# Patient Record
Sex: MALE | Race: WHITE | Employment: OTHER | URBAN - METROPOLITAN AREA
[De-identification: names, ages, dates, MRNs, and addresses within clinical notes are randomized per-mention and may not be internally consistent; named-entity substitution may affect disease eponyms.]

---

## 2017-01-01 ENCOUNTER — NURSING HOME VISIT (OUTPATIENT)
Dept: GERIATRICS | Facility: CLINIC | Age: 82
End: 2017-01-01
Payer: COMMERCIAL

## 2017-01-01 ENCOUNTER — NURSING HOME VISIT (OUTPATIENT)
Dept: GERIATRICS | Facility: CLINIC | Age: 82
End: 2017-01-01

## 2017-01-01 ENCOUNTER — HOSPITAL LABORATORY (OUTPATIENT)
Dept: OTHER | Facility: CLINIC | Age: 82
End: 2017-01-01

## 2017-01-01 ENCOUNTER — DOCUMENTATION ONLY (OUTPATIENT)
Dept: OTHER | Facility: CLINIC | Age: 82
End: 2017-01-01

## 2017-01-01 ENCOUNTER — ALLIED HEALTH/NURSE VISIT (OUTPATIENT)
Dept: CARDIOLOGY | Facility: CLINIC | Age: 82
End: 2017-01-01
Payer: COMMERCIAL

## 2017-01-01 ENCOUNTER — DOCUMENTATION ONLY (OUTPATIENT)
Dept: CARDIOLOGY | Facility: CLINIC | Age: 82
End: 2017-01-01

## 2017-01-01 ENCOUNTER — NURSING HOME VISIT (OUTPATIENT)
Dept: FAMILY MEDICINE | Facility: CLINIC | Age: 82
End: 2017-01-01

## 2017-01-01 ENCOUNTER — NURSING HOME VISIT (OUTPATIENT)
Dept: FAMILY MEDICINE | Facility: CLINIC | Age: 82
End: 2017-01-01
Payer: COMMERCIAL

## 2017-01-01 VITALS
DIASTOLIC BLOOD PRESSURE: 78 MMHG | HEIGHT: 69 IN | TEMPERATURE: 97.6 F | WEIGHT: 152.8 LBS | SYSTOLIC BLOOD PRESSURE: 144 MMHG | RESPIRATION RATE: 18 BRPM | BODY MASS INDEX: 22.63 KG/M2 | HEART RATE: 63 BPM | OXYGEN SATURATION: 94 %

## 2017-01-01 VITALS
HEIGHT: 69 IN | OXYGEN SATURATION: 96 % | BODY MASS INDEX: 23.4 KG/M2 | HEART RATE: 61 BPM | TEMPERATURE: 97.9 F | WEIGHT: 158 LBS | DIASTOLIC BLOOD PRESSURE: 59 MMHG | SYSTOLIC BLOOD PRESSURE: 130 MMHG | RESPIRATION RATE: 18 BRPM

## 2017-01-01 VITALS
TEMPERATURE: 98.8 F | HEART RATE: 61 BPM | HEIGHT: 69 IN | DIASTOLIC BLOOD PRESSURE: 57 MMHG | WEIGHT: 162.8 LBS | RESPIRATION RATE: 16 BRPM | BODY MASS INDEX: 24.11 KG/M2 | SYSTOLIC BLOOD PRESSURE: 118 MMHG | OXYGEN SATURATION: 95 %

## 2017-01-01 VITALS
DIASTOLIC BLOOD PRESSURE: 76 MMHG | BODY MASS INDEX: 22.63 KG/M2 | TEMPERATURE: 98.1 F | RESPIRATION RATE: 16 BRPM | OXYGEN SATURATION: 94 % | HEART RATE: 68 BPM | SYSTOLIC BLOOD PRESSURE: 142 MMHG | WEIGHT: 152.8 LBS | HEIGHT: 69 IN

## 2017-01-01 VITALS
WEIGHT: 162.8 LBS | OXYGEN SATURATION: 95 % | HEIGHT: 69 IN | TEMPERATURE: 98.8 F | HEART RATE: 61 BPM | SYSTOLIC BLOOD PRESSURE: 118 MMHG | BODY MASS INDEX: 24.11 KG/M2 | DIASTOLIC BLOOD PRESSURE: 57 MMHG | RESPIRATION RATE: 16 BRPM

## 2017-01-01 VITALS
DIASTOLIC BLOOD PRESSURE: 67 MMHG | WEIGHT: 162.8 LBS | SYSTOLIC BLOOD PRESSURE: 140 MMHG | BODY MASS INDEX: 24.11 KG/M2 | HEIGHT: 69 IN | TEMPERATURE: 98 F | HEART RATE: 67 BPM | OXYGEN SATURATION: 96 % | RESPIRATION RATE: 18 BRPM

## 2017-01-01 VITALS
WEIGHT: 162.8 LBS | HEART RATE: 67 BPM | HEIGHT: 69 IN | BODY MASS INDEX: 24.11 KG/M2 | OXYGEN SATURATION: 95 % | TEMPERATURE: 98 F | RESPIRATION RATE: 18 BRPM | DIASTOLIC BLOOD PRESSURE: 67 MMHG | SYSTOLIC BLOOD PRESSURE: 140 MMHG

## 2017-01-01 VITALS
DIASTOLIC BLOOD PRESSURE: 67 MMHG | WEIGHT: 162.8 LBS | BODY MASS INDEX: 24.11 KG/M2 | RESPIRATION RATE: 18 BRPM | OXYGEN SATURATION: 98 % | HEIGHT: 69 IN | TEMPERATURE: 98 F | SYSTOLIC BLOOD PRESSURE: 140 MMHG | HEART RATE: 67 BPM

## 2017-01-01 VITALS
DIASTOLIC BLOOD PRESSURE: 78 MMHG | BODY MASS INDEX: 22.63 KG/M2 | SYSTOLIC BLOOD PRESSURE: 144 MMHG | OXYGEN SATURATION: 97 % | HEART RATE: 63 BPM | TEMPERATURE: 97.6 F | WEIGHT: 152.8 LBS | RESPIRATION RATE: 18 BRPM | HEIGHT: 69 IN

## 2017-01-01 VITALS
RESPIRATION RATE: 18 BRPM | WEIGHT: 161.4 LBS | HEART RATE: 60 BPM | BODY MASS INDEX: 23.91 KG/M2 | DIASTOLIC BLOOD PRESSURE: 58 MMHG | SYSTOLIC BLOOD PRESSURE: 123 MMHG | OXYGEN SATURATION: 97 % | HEIGHT: 69 IN | TEMPERATURE: 97.9 F

## 2017-01-01 VITALS
BODY MASS INDEX: 22.63 KG/M2 | HEIGHT: 69 IN | TEMPERATURE: 98.1 F | DIASTOLIC BLOOD PRESSURE: 76 MMHG | WEIGHT: 152.8 LBS | RESPIRATION RATE: 16 BRPM | HEART RATE: 68 BPM | OXYGEN SATURATION: 98 % | SYSTOLIC BLOOD PRESSURE: 142 MMHG

## 2017-01-01 VITALS
BODY MASS INDEX: 23.25 KG/M2 | WEIGHT: 157 LBS | TEMPERATURE: 97.9 F | DIASTOLIC BLOOD PRESSURE: 77 MMHG | HEART RATE: 61 BPM | SYSTOLIC BLOOD PRESSURE: 132 MMHG | OXYGEN SATURATION: 96 % | HEIGHT: 69 IN | RESPIRATION RATE: 18 BRPM

## 2017-01-01 VITALS
RESPIRATION RATE: 16 BRPM | BODY MASS INDEX: 22.63 KG/M2 | SYSTOLIC BLOOD PRESSURE: 132 MMHG | DIASTOLIC BLOOD PRESSURE: 73 MMHG | OXYGEN SATURATION: 95 % | TEMPERATURE: 98.6 F | WEIGHT: 152.8 LBS | HEIGHT: 69 IN | HEART RATE: 64 BPM

## 2017-01-01 VITALS
DIASTOLIC BLOOD PRESSURE: 78 MMHG | TEMPERATURE: 97.6 F | RESPIRATION RATE: 16 BRPM | HEIGHT: 69 IN | WEIGHT: 158 LBS | HEART RATE: 72 BPM | OXYGEN SATURATION: 96 % | SYSTOLIC BLOOD PRESSURE: 121 MMHG | BODY MASS INDEX: 23.4 KG/M2

## 2017-01-01 VITALS
WEIGHT: 162.8 LBS | BODY MASS INDEX: 24.04 KG/M2 | RESPIRATION RATE: 16 BRPM | DIASTOLIC BLOOD PRESSURE: 57 MMHG | OXYGEN SATURATION: 95 % | TEMPERATURE: 98.8 F | SYSTOLIC BLOOD PRESSURE: 118 MMHG | HEART RATE: 61 BPM

## 2017-01-01 VITALS
TEMPERATURE: 98.8 F | BODY MASS INDEX: 24.11 KG/M2 | DIASTOLIC BLOOD PRESSURE: 57 MMHG | WEIGHT: 162.8 LBS | HEIGHT: 69 IN | SYSTOLIC BLOOD PRESSURE: 118 MMHG | RESPIRATION RATE: 16 BRPM | HEART RATE: 61 BPM | OXYGEN SATURATION: 96 %

## 2017-01-01 VITALS
HEART RATE: 62 BPM | TEMPERATURE: 97.6 F | SYSTOLIC BLOOD PRESSURE: 107 MMHG | HEIGHT: 69 IN | DIASTOLIC BLOOD PRESSURE: 57 MMHG | OXYGEN SATURATION: 96 % | RESPIRATION RATE: 18 BRPM | WEIGHT: 161.4 LBS | BODY MASS INDEX: 23.91 KG/M2

## 2017-01-01 VITALS
BODY MASS INDEX: 22.51 KG/M2 | SYSTOLIC BLOOD PRESSURE: 117 MMHG | DIASTOLIC BLOOD PRESSURE: 52 MMHG | OXYGEN SATURATION: 96 % | TEMPERATURE: 98.5 F | WEIGHT: 152 LBS | HEART RATE: 61 BPM | HEIGHT: 69 IN | RESPIRATION RATE: 16 BRPM

## 2017-01-01 VITALS
RESPIRATION RATE: 16 BRPM | HEART RATE: 61 BPM | DIASTOLIC BLOOD PRESSURE: 52 MMHG | SYSTOLIC BLOOD PRESSURE: 117 MMHG | TEMPERATURE: 98.5 F | HEIGHT: 69 IN | BODY MASS INDEX: 22.51 KG/M2 | OXYGEN SATURATION: 95 % | WEIGHT: 152 LBS

## 2017-01-01 VITALS
HEART RATE: 61 BPM | BODY MASS INDEX: 24.11 KG/M2 | OXYGEN SATURATION: 97 % | WEIGHT: 162.8 LBS | RESPIRATION RATE: 16 BRPM | TEMPERATURE: 98.8 F | SYSTOLIC BLOOD PRESSURE: 118 MMHG | DIASTOLIC BLOOD PRESSURE: 57 MMHG | HEIGHT: 69 IN

## 2017-01-01 VITALS
OXYGEN SATURATION: 95 % | TEMPERATURE: 97.6 F | DIASTOLIC BLOOD PRESSURE: 57 MMHG | HEART RATE: 62 BPM | SYSTOLIC BLOOD PRESSURE: 107 MMHG | HEIGHT: 69 IN | BODY MASS INDEX: 23.91 KG/M2 | WEIGHT: 161.4 LBS | RESPIRATION RATE: 18 BRPM

## 2017-01-01 VITALS
RESPIRATION RATE: 18 BRPM | HEIGHT: 69 IN | SYSTOLIC BLOOD PRESSURE: 123 MMHG | WEIGHT: 161.4 LBS | TEMPERATURE: 97.9 F | DIASTOLIC BLOOD PRESSURE: 58 MMHG | BODY MASS INDEX: 23.91 KG/M2 | OXYGEN SATURATION: 96 % | HEART RATE: 60 BPM

## 2017-01-01 VITALS
HEART RATE: 68 BPM | SYSTOLIC BLOOD PRESSURE: 110 MMHG | WEIGHT: 157 LBS | HEIGHT: 69 IN | OXYGEN SATURATION: 95 % | RESPIRATION RATE: 16 BRPM | DIASTOLIC BLOOD PRESSURE: 60 MMHG | TEMPERATURE: 97.8 F | BODY MASS INDEX: 23.25 KG/M2

## 2017-01-01 VITALS
SYSTOLIC BLOOD PRESSURE: 107 MMHG | DIASTOLIC BLOOD PRESSURE: 57 MMHG | TEMPERATURE: 97.6 F | RESPIRATION RATE: 18 BRPM | BODY MASS INDEX: 23.91 KG/M2 | OXYGEN SATURATION: 96 % | HEART RATE: 62 BPM | HEIGHT: 69 IN | WEIGHT: 161.4 LBS

## 2017-01-01 DIAGNOSIS — S72.002K CLOSED FRACTURE OF NECK OF LEFT FEMUR WITH NONUNION, SUBSEQUENT ENCOUNTER: ICD-10-CM

## 2017-01-01 DIAGNOSIS — Z79.01 ENCOUNTER FOR MONITORING COUMADIN THERAPY: ICD-10-CM

## 2017-01-01 DIAGNOSIS — I48.0 PAROXYSMAL ATRIAL FIBRILLATION (H): Primary | ICD-10-CM

## 2017-01-01 DIAGNOSIS — M15.9 OSTEOARTHRITIS OF MULTIPLE JOINTS, UNSPECIFIED OSTEOARTHRITIS TYPE: Primary | ICD-10-CM

## 2017-01-01 DIAGNOSIS — G89.29 OTHER CHRONIC PAIN: ICD-10-CM

## 2017-01-01 DIAGNOSIS — I50.22 CHRONIC SYSTOLIC CONGESTIVE HEART FAILURE (H): ICD-10-CM

## 2017-01-01 DIAGNOSIS — J44.9 COPD, MODERATE (H): ICD-10-CM

## 2017-01-01 DIAGNOSIS — Z85.038 HISTORY OF COLON CANCER, NO STAGING: ICD-10-CM

## 2017-01-01 DIAGNOSIS — G61.82 MULTIFOCAL MOTOR NEUROPATHY (H): ICD-10-CM

## 2017-01-01 DIAGNOSIS — Z79.01 LONG-TERM (CURRENT) USE OF ANTICOAGULANTS: ICD-10-CM

## 2017-01-01 DIAGNOSIS — Z79.01 ENCOUNTER FOR MONITORING COUMADIN THERAPY: Primary | ICD-10-CM

## 2017-01-01 DIAGNOSIS — E11.22 TYPE 2 DIABETES MELLITUS WITH STAGE 3 CHRONIC KIDNEY DISEASE, WITHOUT LONG-TERM CURRENT USE OF INSULIN (H): ICD-10-CM

## 2017-01-01 DIAGNOSIS — I48.0 PAROXYSMAL ATRIAL FIBRILLATION (H): ICD-10-CM

## 2017-01-01 DIAGNOSIS — N18.30 CKD (CHRONIC KIDNEY DISEASE) STAGE 3, GFR 30-59 ML/MIN (H): ICD-10-CM

## 2017-01-01 DIAGNOSIS — E11.42 TYPE 2 DIABETES MELLITUS WITH DIABETIC POLYNEUROPATHY, WITHOUT LONG-TERM CURRENT USE OF INSULIN (H): Primary | ICD-10-CM

## 2017-01-01 DIAGNOSIS — F41.9 ANXIETY DISORDER, UNSPECIFIED TYPE: Primary | ICD-10-CM

## 2017-01-01 DIAGNOSIS — F41.9 ANXIETY: ICD-10-CM

## 2017-01-01 DIAGNOSIS — R30.0 DYSURIA: Primary | ICD-10-CM

## 2017-01-01 DIAGNOSIS — Z51.81 ENCOUNTER FOR MONITORING COUMADIN THERAPY: ICD-10-CM

## 2017-01-01 DIAGNOSIS — F41.9 ANXIETY DISORDER, UNSPECIFIED TYPE: ICD-10-CM

## 2017-01-01 DIAGNOSIS — Z51.81 ENCOUNTER FOR THERAPEUTIC DRUG MONITORING: Primary | ICD-10-CM

## 2017-01-01 DIAGNOSIS — S72.002K CLOSED FRACTURE OF NECK OF LEFT FEMUR WITH NONUNION, SUBSEQUENT ENCOUNTER: Primary | ICD-10-CM

## 2017-01-01 DIAGNOSIS — J44.9 COPD, MODERATE (H): Primary | ICD-10-CM

## 2017-01-01 DIAGNOSIS — F32.1 MAJOR DEPRESSIVE DISORDER, SINGLE EPISODE, MODERATE (H): ICD-10-CM

## 2017-01-01 DIAGNOSIS — H61.23 BILATERAL IMPACTED CERUMEN: ICD-10-CM

## 2017-01-01 DIAGNOSIS — N39.0 URINARY TRACT INFECTION WITHOUT HEMATURIA, SITE UNSPECIFIED: Primary | ICD-10-CM

## 2017-01-01 DIAGNOSIS — E11.40 TYPE 2 DIABETES MELLITUS WITH DIABETIC NEUROPATHY, WITHOUT LONG-TERM CURRENT USE OF INSULIN (H): Primary | ICD-10-CM

## 2017-01-01 DIAGNOSIS — Z51.81 ENCOUNTER FOR MONITORING COUMADIN THERAPY: Primary | ICD-10-CM

## 2017-01-01 DIAGNOSIS — N18.30 TYPE 2 DIABETES MELLITUS WITH STAGE 3 CHRONIC KIDNEY DISEASE, WITHOUT LONG-TERM CURRENT USE OF INSULIN (H): ICD-10-CM

## 2017-01-01 DIAGNOSIS — G62.9 NEUROPATHY: Primary | ICD-10-CM

## 2017-01-01 DIAGNOSIS — Z87.39 HISTORY OF ACUTE GOUTY ARTHRITIS: ICD-10-CM

## 2017-01-01 DIAGNOSIS — R05.9 COUGH: ICD-10-CM

## 2017-01-01 DIAGNOSIS — G89.4 CHRONIC PAIN SYNDROME: ICD-10-CM

## 2017-01-01 DIAGNOSIS — M25.511 RIGHT SHOULDER PAIN, UNSPECIFIED CHRONICITY: Primary | ICD-10-CM

## 2017-01-01 DIAGNOSIS — Z53.9 ERRONEOUS ENCOUNTER--DISREGARD: Primary | ICD-10-CM

## 2017-01-01 DIAGNOSIS — G62.9 NEUROPATHY: ICD-10-CM

## 2017-01-01 DIAGNOSIS — M10.9 GOUT OF RIGHT HAND, UNSPECIFIED CAUSE, UNSPECIFIED CHRONICITY: Primary | ICD-10-CM

## 2017-01-01 DIAGNOSIS — E11.40 TYPE 2 DIABETES MELLITUS WITH DIABETIC NEUROPATHY, WITHOUT LONG-TERM CURRENT USE OF INSULIN (H): ICD-10-CM

## 2017-01-01 DIAGNOSIS — Z95.0 CARDIAC PACEMAKER IN SITU: Primary | ICD-10-CM

## 2017-01-01 DIAGNOSIS — Z71.89 ADVANCE CARE PLANNING: Chronic | ICD-10-CM

## 2017-01-01 DIAGNOSIS — Z87.440 PERSONAL HISTORY OF URINARY TRACT INFECTION: ICD-10-CM

## 2017-01-01 DIAGNOSIS — K43.9 VENTRAL HERNIA WITHOUT OBSTRUCTION OR GANGRENE: ICD-10-CM

## 2017-01-01 DIAGNOSIS — Z95.0 CARDIAC PACEMAKER IN SITU: ICD-10-CM

## 2017-01-01 DIAGNOSIS — B37.2 CANDIDIASIS OF SKIN: Primary | ICD-10-CM

## 2017-01-01 DIAGNOSIS — R09.02 HYPOXIA: ICD-10-CM

## 2017-01-01 DIAGNOSIS — N39.0 URINARY TRACT INFECTION WITHOUT HEMATURIA, SITE UNSPECIFIED: ICD-10-CM

## 2017-01-01 DIAGNOSIS — J06.9 UPPER RESPIRATORY TRACT INFECTION, UNSPECIFIED TYPE: ICD-10-CM

## 2017-01-01 DIAGNOSIS — R06.02 SOB (SHORTNESS OF BREATH): Primary | ICD-10-CM

## 2017-01-01 DIAGNOSIS — F10.21 ALCOHOL DEPENDENCE IN REMISSION (H): ICD-10-CM

## 2017-01-01 LAB
ALBUMIN UR-MCNC: 100 MG/DL
ALBUMIN UR-MCNC: 30 MG/DL
ALBUMIN UR-MCNC: NEGATIVE MG/DL
ANION GAP SERPL CALCULATED.3IONS-SCNC: 10 MMOL/L (ref 3–14)
ANION GAP SERPL CALCULATED.3IONS-SCNC: 7 MMOL/L (ref 3–14)
ANION GAP SERPL CALCULATED.3IONS-SCNC: 8 MMOL/L (ref 3–14)
APPEARANCE UR: ABNORMAL
APPEARANCE UR: ABNORMAL
APPEARANCE UR: CLEAR
BACTERIA #/AREA URNS HPF: ABNORMAL /HPF
BACTERIA SPEC CULT: ABNORMAL
BILIRUB UR QL STRIP: NEGATIVE
BUN SERPL-MCNC: 29 MG/DL (ref 7–30)
BUN SERPL-MCNC: 29 MG/DL (ref 7–30)
BUN SERPL-MCNC: 36 MG/DL (ref 7–30)
CALCIUM SERPL-MCNC: 8.6 MG/DL (ref 8.5–10.1)
CALCIUM SERPL-MCNC: 8.8 MG/DL (ref 8.5–10.1)
CALCIUM SERPL-MCNC: 9 MG/DL (ref 8.5–10.1)
CHLORIDE SERPL-SCNC: 104 MMOL/L (ref 94–109)
CHLORIDE SERPL-SCNC: 105 MMOL/L (ref 94–109)
CHLORIDE SERPL-SCNC: 105 MMOL/L (ref 94–109)
CO2 SERPL-SCNC: 25 MMOL/L (ref 20–32)
CO2 SERPL-SCNC: 27 MMOL/L (ref 20–32)
CO2 SERPL-SCNC: 27 MMOL/L (ref 20–32)
COLOR UR AUTO: YELLOW
CREAT SERPL-MCNC: 1.34 MG/DL (ref 0.66–1.25)
CREAT SERPL-MCNC: 1.45 MG/DL (ref 0.66–1.25)
CREAT SERPL-MCNC: 1.48 MG/DL (ref 0.66–1.25)
ERYTHROCYTE [DISTWIDTH] IN BLOOD BY AUTOMATED COUNT: 14 % (ref 10–15)
ERYTHROCYTE [DISTWIDTH] IN BLOOD BY AUTOMATED COUNT: 14.1 % (ref 10–15)
ERYTHROCYTE [DISTWIDTH] IN BLOOD BY AUTOMATED COUNT: 14.1 % (ref 10–15)
GFR SERPL CREATININE-BSD FRML MDRD: 45 ML/MIN/1.7M2
GFR SERPL CREATININE-BSD FRML MDRD: 46 ML/MIN/1.7M2
GFR SERPL CREATININE-BSD FRML MDRD: 50 ML/MIN/1.7M2
GLUCOSE SERPL-MCNC: 105 MG/DL (ref 70–99)
GLUCOSE SERPL-MCNC: 133 MG/DL (ref 70–99)
GLUCOSE SERPL-MCNC: 95 MG/DL (ref 70–99)
GLUCOSE UR STRIP-MCNC: NEGATIVE MG/DL
HBA1C MFR BLD: 7.8 % (ref 4.3–6)
HCT VFR BLD AUTO: 31.7 % (ref 40–53)
HCT VFR BLD AUTO: 32.9 % (ref 40–53)
HCT VFR BLD AUTO: 34.6 % (ref 40–53)
HGB BLD-MCNC: 10 G/DL (ref 13.3–17.7)
HGB BLD-MCNC: 10.5 G/DL (ref 13.3–17.7)
HGB BLD-MCNC: 11 G/DL (ref 13.3–17.7)
HGB UR QL STRIP: ABNORMAL
HGB UR QL STRIP: ABNORMAL
HGB UR QL STRIP: NEGATIVE
KETONES UR STRIP-MCNC: NEGATIVE MG/DL
LEUKOCYTE ESTERASE UR QL STRIP: ABNORMAL
LEUKOCYTE ESTERASE UR QL STRIP: ABNORMAL
LEUKOCYTE ESTERASE UR QL STRIP: NEGATIVE
Lab: ABNORMAL
Lab: ABNORMAL
MCH RBC QN AUTO: 28 PG (ref 26.5–33)
MCH RBC QN AUTO: 28.2 PG (ref 26.5–33)
MCH RBC QN AUTO: 28.5 PG (ref 26.5–33)
MCHC RBC AUTO-ENTMCNC: 31.5 G/DL (ref 31.5–36.5)
MCHC RBC AUTO-ENTMCNC: 31.8 G/DL (ref 31.5–36.5)
MCHC RBC AUTO-ENTMCNC: 31.9 G/DL (ref 31.5–36.5)
MCV RBC AUTO: 88 FL (ref 78–100)
MCV RBC AUTO: 89 FL (ref 78–100)
MCV RBC AUTO: 90 FL (ref 78–100)
NITRATE UR QL: NEGATIVE
NITRATE UR QL: POSITIVE
NITRATE UR QL: POSITIVE
PH UR STRIP: 5 PH (ref 5–7)
PH UR STRIP: 7.5 PH (ref 5–7)
PH UR STRIP: 8.5 PH (ref 5–7)
PLATELET # BLD AUTO: 183 10E9/L (ref 150–450)
PLATELET # BLD AUTO: 190 10E9/L (ref 150–450)
PLATELET # BLD AUTO: 211 10E9/L (ref 150–450)
POTASSIUM SERPL-SCNC: 4 MMOL/L (ref 3.4–5.3)
POTASSIUM SERPL-SCNC: 4.2 MMOL/L (ref 3.4–5.3)
POTASSIUM SERPL-SCNC: 4.7 MMOL/L (ref 3.4–5.3)
RBC # BLD AUTO: 3.54 10E12/L (ref 4.4–5.9)
RBC # BLD AUTO: 3.69 10E12/L (ref 4.4–5.9)
RBC # BLD AUTO: 3.93 10E12/L (ref 4.4–5.9)
RBC #/AREA URNS AUTO: 31 /HPF (ref 0–2)
RBC #/AREA URNS AUTO: 83 /HPF (ref 0–2)
SODIUM SERPL-SCNC: 139 MMOL/L (ref 133–144)
SODIUM SERPL-SCNC: 139 MMOL/L (ref 133–144)
SODIUM SERPL-SCNC: 140 MMOL/L (ref 133–144)
SOURCE: ABNORMAL
SOURCE: ABNORMAL
SOURCE: NORMAL
SP GR UR STRIP: 1.01 (ref 1–1.03)
SPECIMEN SOURCE: ABNORMAL
SPECIMEN SOURCE: ABNORMAL
SQUAMOUS #/AREA URNS AUTO: <1 /HPF (ref 0–1)
UROBILINOGEN UR STRIP-MCNC: NORMAL MG/DL (ref 0–2)
WBC # BLD AUTO: 7 10E9/L (ref 4–11)
WBC # BLD AUTO: 7.6 10E9/L (ref 4–11)
WBC # BLD AUTO: 9.3 10E9/L (ref 4–11)
WBC #/AREA URNS AUTO: 2 /HPF (ref 0–2)
WBC #/AREA URNS AUTO: 6 /HPF (ref 0–2)

## 2017-01-01 PROCEDURE — 99308 SBSQ NF CARE LOW MDM 20: CPT | Performed by: NURSE PRACTITIONER

## 2017-01-01 PROCEDURE — 99309 SBSQ NF CARE MODERATE MDM 30: CPT | Performed by: NURSE PRACTITIONER

## 2017-01-01 PROCEDURE — 69210 REMOVE IMPACTED EAR WAX UNI: CPT | Performed by: NURSE PRACTITIONER

## 2017-01-01 PROCEDURE — 99309 SBSQ NF CARE MODERATE MDM 30: CPT | Performed by: INTERNAL MEDICINE

## 2017-01-01 PROCEDURE — 93294 REM INTERROG EVL PM/LDLS PM: CPT | Performed by: INTERNAL MEDICINE

## 2017-01-01 PROCEDURE — 93296 REM INTERROG EVL PM/IDS: CPT | Performed by: INTERNAL MEDICINE

## 2017-01-01 PROCEDURE — 99318 ZZC ANNUAL NURSING FAC ASSESSMNT, STABLE: CPT | Mod: 25 | Performed by: NURSE PRACTITIONER

## 2017-01-01 RX ORDER — AMOXICILLIN 250 MG
1 CAPSULE ORAL DAILY
COMMUNITY

## 2017-01-01 RX ORDER — CLOTRIMAZOLE 1 %
CREAM (GRAM) TOPICAL EVERY 12 HOURS
COMMUNITY
End: 2018-01-01

## 2017-01-01 RX ORDER — ALBUTEROL SULFATE 0.83 MG/ML
1 SOLUTION RESPIRATORY (INHALATION) 2 TIMES DAILY
COMMUNITY

## 2017-01-01 RX ORDER — ALBUTEROL SULFATE 0.83 MG/ML
1 SOLUTION RESPIRATORY (INHALATION) 2 TIMES DAILY
COMMUNITY
Start: 2017-01-01 | End: 2017-01-01

## 2017-01-02 ENCOUNTER — NURSING HOME VISIT (OUTPATIENT)
Dept: GERIATRICS | Facility: CLINIC | Age: 82
End: 2017-01-02

## 2017-01-02 DIAGNOSIS — M25.552 HIP PAIN, LEFT: Primary | ICD-10-CM

## 2017-01-02 NOTE — PROGRESS NOTES
Ortho Nursing home visit    Suad Sloan is a 86 year old male who resides at [facility] 3rd floor    Patient is seen today for eval of left hip pain, and ability to transfer bed to chair, patient has been angela lift 2nd to hip fx, treated non-op, and bed to chair mobility;. Under hospice care,       Past Medical History   Diagnosis Date     Hypertension      resolved     Atrial fibrillation (H)      on coumadin -  Dr Huddleston     Coronary atherosclerosis 1995     Dr Huddleston     Arthritis of hands      hands/feet - dr varela     Alcohol dependence (H)      quit 2003     Iron deficiency anemia 7/05     AAA (abdominal aortic aneurysm) (H) 9/05     AAA, Lt Iliac - Dr Garcia     Diverticulosis of colon (without mention of hemorrhage)      Type 2 diabetes mellitus (H) 2005     Sleep apnea 6/10     moderate - auto ASV - dr goltz/darvin     Restless leg syndrome      Hyperlipidemia      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     PPM     CKD (chronic kidney disease) stage 3, GFR 30-59 ml/min      COPD, moderate (H)      Peripheral neuropathy (H)      Cardiomyopathy (H)      EF 35-40% on 12/2014 Echo     Vertigo      Pulmonary hypertension (H)      Depression      Anxiety      Chronic pain      Chronic systolic congestive heart failure (H)      EF 35-40% on 12/2014 Echo     Iliac artery aneurysm, left (H)      Malignant neoplasm of prostate (H) 1994     s/p radiation     Colon cancer (H) 9/05     Grade IIA adenoca -Stage 2 T3N0N0 - Dr Felder, Dr Hampton tubular adenoma, 2 cm colon ca mass     Bladder cancer (H) 5/06     dr rodriguez- cyst removed and bx was benign      Past Surgical History   Procedure Laterality Date     Cholecystectomy  1997     Appendectomy open  1970's     Colonoscopy  9/05, 4/07, 2/11     tubular adenoma - due 3 yrs     Colon surgery  9/05     colon ca, dr hampton,      Aaa repair / umbilical hernia repair  6/06     dr garcia      Cataract iol, rt/lt Right 1/09     dr kurtz     Cataract iol, rt/lt Left 2/09      dr kurtz     Lysis of adhesions, repair of incisional hernias  2/10     Implant pacemaker  09-16-11     C fabric wrapping of abdominal aneurysm       Endovascular repair aneurysm abdominal aorta N/A 12/16/2015     ENDOVASCULAR REPAIR ANEURYSM ABDOMINAL AORTA;  Surgeon: Timmy Abdalla MD;  Location: [facility] OR     Esophagoscopy, gastroscopy, duodenoscopy (egd), combined N/A 3/28/2016     Esophagitis, small erosions, inflammtory nodule        Allergies   Allergen Reactions     Ace Inhibitors      hyperkalemia     Cleocin      Severe Heartburn     Dulera      Leg cramps, gas, mouth sores     Erythromycin      upset stomach     Hydralazine      Throat swelling     Imdur [Isosorbide]      Stomach upset     Methadone Other (See Comments)     Became very confused and too sedated     Penicillins Nausea     Spiriva Handihaler      Mouth sores, leg cramps      There were no vitals taken for this visit.     Exam: Today, with staff, and wife, patient has attempt to transfer from bed to chair W/O mech lift, and is clearly unable to bear weight for standing on Right leg ( unaffected ) leg, to transfer safely.      X-rays show Displaced left fem neck fracture:    ASSESSMENT / PLAN: Everyone is in agreement today, that continuing with Ronal Lift is appropriate way to transfer:  Will continue to follow:    [id number]          [facility]  [phone]

## 2017-01-03 ENCOUNTER — TELEPHONE (OUTPATIENT)
Dept: GERIATRICS | Facility: CLINIC | Age: 82
End: 2017-01-03

## 2017-01-03 ENCOUNTER — MEDICAL CORRESPONDENCE (OUTPATIENT)
Dept: HEALTH INFORMATION MANAGEMENT | Facility: CLINIC | Age: 82
End: 2017-01-03

## 2017-01-03 NOTE — TELEPHONE ENCOUNTER
S: Nursing called, concerned as noted multiple bruises to patient's arms and legs, wanted to know if they should continue coumadin.      B: on coumadin for a-fib: current dose 4.25mg QD  -hospice care patient    A: INR 1.7 today  Nursing unsure of where bruises came from, they are not bothersome to patient.  No current open areas or active bleeding    R: ok to continue current dosing of coumadin--update if active signs of bleeding or discomfort to patient  -primary NP or hospice can decide with family if want to continue coumadin now that on hospice

## 2017-01-06 ENCOUNTER — NURSING HOME VISIT (OUTPATIENT)
Dept: GERIATRICS | Facility: CLINIC | Age: 82
End: 2017-01-06
Payer: MEDICARE

## 2017-01-06 DIAGNOSIS — Z79.01 ENCOUNTER FOR MONITORING COUMADIN THERAPY: ICD-10-CM

## 2017-01-06 DIAGNOSIS — Z51.81 ENCOUNTER FOR MONITORING COUMADIN THERAPY: ICD-10-CM

## 2017-01-06 DIAGNOSIS — I48.0 PAROXYSMAL ATRIAL FIBRILLATION (H): Primary | ICD-10-CM

## 2017-01-06 PROCEDURE — 99207 ZZC CDG-CORRECTLY CODED, REVIEWED AND AGREE: CPT | Performed by: NURSE PRACTITIONER

## 2017-01-06 PROCEDURE — 99307 SBSQ NF CARE SF MDM 10: CPT | Mod: GW | Performed by: NURSE PRACTITIONER

## 2017-01-06 NOTE — PROGRESS NOTES
[facility]    HPI:    Suad Sloan is a 86 year old  (12/23/1930), who is being seen today for an episodic care visit at [facility]. Today's concern is INR/Coumadin management for A. Fib    Bleeding Signs/Symptoms:  None  Thromboembolic Signs/Symptoms:  None    Medication Changes:  No  Dietary Changes:  Yes  Activity Changes: No  Bacterial/Viral Infection:  No    Missed Coumadin Doses:  None    Other Concerns:  No      OBJECTIVE:    INR Today:  1.6  Current Dose:  4.25 mg po daily    ASSESSMENT:    Subtherapeutic INR for goal of 2-3    PLAN:    New Dose: 4.5 mg po daily      Next INR: 1 week    Caro Warner NP

## 2017-01-16 ENCOUNTER — ALLIED HEALTH/NURSE VISIT (OUTPATIENT)
Dept: CARDIOLOGY | Facility: CLINIC | Age: 82
End: 2017-01-16
Payer: MEDICARE

## 2017-01-16 DIAGNOSIS — Z95.0 CARDIAC PACEMAKER IN SITU: Primary | ICD-10-CM

## 2017-01-16 PROCEDURE — 93294 REM INTERROG EVL PM/LDLS PM: CPT | Mod: GW | Performed by: INTERNAL MEDICINE

## 2017-01-16 PROCEDURE — 93296 REM INTERROG EVL PM/IDS: CPT | Mod: GW | Performed by: INTERNAL MEDICINE

## 2017-01-16 NOTE — PROGRESS NOTES
[employer] Sensia SESR01 (S) Remote PPM Device Check  : 93%  Mode: VVIR        Presenting Rhythm:   Heart Rate: adequate heart rates per histogram  Sensing: not performed    Pacing Threshold: stable    Impedance: stable  Battery Status: 3 - 5.5 years remaining  Atrial Arrhythmia: chronic Afib, taking Coumadin  Ventricular Arrhythmia: 4 vent high rates. Marker only EGMs show irregular VS events suggesting RVR. Reviewed findings with Emanuel MCCRARY     Care Plan: F/U [facility] q 3 months. Mailed letter with results and next transmission date. Yesenia LIRIANO

## 2017-01-17 ENCOUNTER — MEDICAL CORRESPONDENCE (OUTPATIENT)
Dept: HEALTH INFORMATION MANAGEMENT | Facility: CLINIC | Age: 82
End: 2017-01-17

## 2017-01-18 ENCOUNTER — DISCHARGE SUMMARY NURSING HOME (OUTPATIENT)
Dept: GERIATRICS | Facility: CLINIC | Age: 82
End: 2017-01-18
Payer: MEDICARE

## 2017-01-18 DIAGNOSIS — Z79.01 LONG-TERM (CURRENT) USE OF ANTICOAGULANTS: ICD-10-CM

## 2017-01-18 DIAGNOSIS — I48.0 PAROXYSMAL ATRIAL FIBRILLATION (H): Primary | ICD-10-CM

## 2017-01-18 PROCEDURE — 99307 SBSQ NF CARE SF MDM 10: CPT | Mod: GW | Performed by: NURSE PRACTITIONER

## 2017-01-18 PROCEDURE — 99207 ZZC CDG-CORRECTLY CODED, REVIEWED AND AGREE: CPT | Performed by: NURSE PRACTITIONER

## 2017-01-18 NOTE — PROGRESS NOTES
[facility]    HPI:    Suad Sloan is a 86 year old  (12/23/1930), who is being seen today for an episodic care visit at [facility]. Today's concern is INR/Coumadin management for A. Fib    Bleeding Signs/Symptoms:  None  Thromboembolic Signs/Symptoms:  None    Medication Changes:  No  Dietary Changes:  No  Activity Changes: No  Bacterial/Viral Infection:  No    Missed Coumadin Doses:  None    Other Concerns:  No      OBJECTIVE:    INR Today:  2.2  Current Dose:  4.75 mg po daily    ASSESSMENT:    Therapeutic INR for goal of 2-3    PLAN:    New Dose: No Change      Next INR: 2 weeks    Caro Warner NP

## 2017-01-19 ENCOUNTER — TELEPHONE (OUTPATIENT)
Dept: OTHER | Facility: CLINIC | Age: 82
End: 2017-01-19

## 2017-01-19 NOTE — TELEPHONE ENCOUNTER
Wife called.  Pt recently has fallen and broke his hip.  Would like to discontinue follow up at this time.  She will contact us, if they decide to resume.    Bernadette Mckeon RN  IR nurse clinician  [phone]  Order    CT Abdomen/Pelvis Angio wo & w Contrast [[id number]] (Order [id number])         Exam Information      Exam Date Exam Time Accession # Performing Department Results      2/15/16  1:30 PM [id number] [facility] CT        PACS Images      Show images for CT Abdomen/Pelvis Angio wo & w Contrast       Study Result      CTA ANGIOGRAM ABDOMEN AND PELVIS  2/15/2016 1:30 PM       HISTORY: 84-year-old male status post surgical repair of an abdominal  aortic aneurysm. 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 Patient  subsequently underwent endovascular treatment for both aneurysms. In  addition, he underwent coiling of the left internal iliac artery.     TECHNIQUE: CT angiogram of the abdomen and pelvis was performed  without contrast and following the administration of 80 mL Isovue-370  contrast. Images are reviewed in multiple planes and 3-D  reconstructions were also performed.     COMPARISON: Angiogram dated 12/16/2015.     FINDINGS: There is a new endovascular cuff excluding the previously  seen pseudoaneurysm which had arisen from the proximal end of an  aortic graft. The cuff is patent. Previously seen pseudoaneurysm has  thrombosed. No evidence for any residual flow.     The left internal iliac artery and branches have been embolized with  coils. There is an endovascular stent limb extending from the left  limb of the previously placed surgical graft into the left external  iliac artery. The limb is patent without significant stenosis. Due to  significant streak artifact from the previously placed coils, the left  internal iliac artery aneurysm is difficult to  adequately measure.     The remainder of the exam is not significantly changed.         IMPRESSION: Successful exclusion of the previously seen pseudoaneurysm  at the proximal end of the abdominal aortic graft. Patent left  extension limb excluding the left internal iliac artery. Suggest  annual followup.     JESSIKA OLMOS MD

## 2017-01-25 ENCOUNTER — NURSING HOME VISIT (OUTPATIENT)
Dept: GERIATRICS | Facility: CLINIC | Age: 82
End: 2017-01-25
Payer: MEDICARE

## 2017-01-25 VITALS
TEMPERATURE: 97.4 F | WEIGHT: 142 LBS | HEIGHT: 69 IN | SYSTOLIC BLOOD PRESSURE: 119 MMHG | RESPIRATION RATE: 18 BRPM | BODY MASS INDEX: 21.03 KG/M2 | HEART RATE: 67 BPM | DIASTOLIC BLOOD PRESSURE: 66 MMHG | OXYGEN SATURATION: 94 %

## 2017-01-25 DIAGNOSIS — M10.9 ACUTE GOUTY ARTHRITIS: ICD-10-CM

## 2017-01-25 DIAGNOSIS — I10 HYPERTENSION GOAL BP (BLOOD PRESSURE) < 140/90: ICD-10-CM

## 2017-01-25 DIAGNOSIS — G89.4 CHRONIC PAIN SYNDROME: ICD-10-CM

## 2017-01-25 DIAGNOSIS — N18.30 CKD (CHRONIC KIDNEY DISEASE) STAGE 3, GFR 30-59 ML/MIN (H): ICD-10-CM

## 2017-01-25 DIAGNOSIS — F41.9 ANXIETY DISORDER, UNSPECIFIED TYPE: ICD-10-CM

## 2017-01-25 DIAGNOSIS — N18.30 TYPE 2 DIABETES MELLITUS WITH STAGE 3 CHRONIC KIDNEY DISEASE, WITHOUT LONG-TERM CURRENT USE OF INSULIN (H): ICD-10-CM

## 2017-01-25 DIAGNOSIS — I50.22 CHRONIC SYSTOLIC CONGESTIVE HEART FAILURE (H): ICD-10-CM

## 2017-01-25 DIAGNOSIS — E11.22 TYPE 2 DIABETES MELLITUS WITH STAGE 3 CHRONIC KIDNEY DISEASE, WITHOUT LONG-TERM CURRENT USE OF INSULIN (H): ICD-10-CM

## 2017-01-25 DIAGNOSIS — S72.002K CLOSED FRACTURE OF NECK OF LEFT FEMUR WITH NONUNION, SUBSEQUENT ENCOUNTER: Primary | ICD-10-CM

## 2017-01-25 PROCEDURE — 99207 ZZC CDG-CORRECTLY CODED, REVIEWED AND AGREE: CPT | Performed by: NURSE PRACTITIONER

## 2017-01-25 PROCEDURE — 99309 SBSQ NF CARE MODERATE MDM 30: CPT | Mod: GW | Performed by: NURSE PRACTITIONER

## 2017-01-25 NOTE — PROGRESS NOTES
[facility]    Chief Complaint   Patient presents with     Pain       HPI:    Suad Sloan is a 86 year old  (12/23/1930), who is being seen today for an episodic care visit at [facility].   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, subsequent encounter  He told me today that he has 0 pain    Chronic systolic congestive heart failure (H)  Weight has stayed stable, has no edema    Chronic pain syndrome  Stated that he is doing well with the current medication regimen    Anxiety disorder, unspecified type  Is doing well with current Vistaril 25 mg po bid    Type 2 diabetes mellitus with stage 3 chronic kidney disease, without long-term current use of insulin (H)  BGTs have been 100 to 200 with current Metformin 250 mg po bid    CKD (chronic kidney disease) stage 3, GFR 30-59 ml/min  GFR 54 Nov 2016    Hypertension goal BP (blood pressure) < 140/90  BP ~ 120/70    Acute gouty arthritis> R wrist and elbow  Is on low dose Prednisone 5 gm po daily without flare ups      ALLERGIES: Ace inhibitors; Cleocin; Dulera; Erythromycin; Hydralazine; Imdur; Methadone; Penicillins; and Spiriva handihaler  Past Medical, Surgical, Family and Social History reviewed and updated in EPIC.    Current Outpatient Prescriptions   Medication Sig Dispense Refill     acetaminophen (TYLENOL) 325 MG tablet Take 325-650 mg by mouth every 4 hours as needed for mild pain       Potassium Chloride ER 20 MEQ TBCR Take 1 tablet (20 mEq) by mouth daily 90 tablet 1     furosemide (LASIX) 20 MG tablet Take 0.5 tablets (10 mg) by mouth daily 30 tablet prn     PREDNISONE PO Take 5 mg by mouth daily       HydrOXYzine Pamoate (VISTARIL PO) Take 25 mg by mouth every 12 hours And 1 prn dose in 24 hrs       sennosides (SENOKOT) 8.6 MG tablet Take 2 tablets by mouth every 12 hours       Warfarin Sodium (COUMADIN PO) Take 4.75 mg by mouth daily         OXYCODONE HCL PO Take 5 mg by mouth every 3 hours Around the clock       METFORMIN HCL PO Take 250 mg by mouth 2 times daily (with meals)       Gabapentin (NEURONTIN PO) Take 300 mg by mouth At Bedtime       ATROPINE SULFATE PO Place 2 drops under the tongue every 2 hours as needed       HALOPERIDOL PO Take 0.5 mg by mouth every 6 hours as needed       DULOXETINE HCL PO Take 60 mg by mouth At Bedtime       guaiFENesin (ROBITUSSIN) 100 MG/5ML SYRP Take 10 mLs by mouth every 4 hours as needed for cough       ferrous sulfate (IRON) 325 (65 FE) MG tablet Take 325 mg by mouth 2 times daily Give with orange juice       triamcinolone (KENALOG) 0.1 % ointment Apply topically 2 times daily Apply to Both lower extremities topically every day and evening shift       ammonium lactate (LAC-HYDRIN) 12 % lotion Apply topically 2 times daily       bisacodyl (DULCOLAX) 10 MG suppository Place 1 suppository (10 mg) rectally daily as needed for constipation 30 suppository      omeprazole (PRILOSEC) 40 MG capsule Take 1 capsule (40 mg) by mouth daily Take 30-60 minutes before a meal. 90 capsule 3     carvedilol (COREG) 12.5 MG tablet Take 6.25 mg by mouth 2 times daily (with meals)       albuterol (PROAIR HFA, PROVENTIL HFA, VENTOLIN HFA) 108 (90 BASE) MCG/ACT inhaler Inhale 2 puffs into the lungs 2 times daily as needed for shortness of breath / dyspnea or wheezing       tamsulosin (FLOMAX) 0.4 MG 24 hr capsule TAKE ONE CAPSULE BY MOUTH EVERY DAY 90 capsule 3     Medications reviewed:  Medications reconciled to facility chart and changes were made to reflect current medications as identified as above med list. Below are the changes that were made:   Medications stopped since last EPIC medication reconciliation:   There are no discontinued medications.    Medications started since last [facility] medication reconciliation:  No orders of the defined types were placed in this encounter.       REVIEW OF SYSTEMS:  10 point ROS of systems  "including Constitutional, Eyes, Respiratory, Cardiovascular, Gastroenterology, Genitourinary, Integumentary, Muscularskeletal, Psychiatric were all negative except for pertinent positives noted in my HPI.    Physical Exam:  /66 mmHg  Pulse 67  Temp(Src) 97.4  F (36.3  C)  Resp 18  Ht 5' 9\" (1.753 m)  Wt 142 lb (64.411 kg)  BMI 20.96 kg/m2  SpO2 94%  GENERAL APPEARANCE:  Alert, in no distress, thin, cooperative  ENT:  Mouth and posterior oropharynx normal, moist mucous membranes, [place]  EYES:  EOM, conjunctivae, lids, pupils and irises normal, good eye contact  RESP:  respiratory effort and palpation of chest normal, lungs clear to auscultation , no respiratory distress  CV:  Palpation and auscultation of heart done , regular rate and rhythm, no murmur, rub, or gallop, no edema  ABDOMEN:  normal bowel sounds, soft, nontender, no hepatosplenomegaly or other masses  M/S:   Gait and station abnormal > he stays in bed 90 % of the day  Has pain with repositioning, does not tolerate much time up in the w/c  SKIN:  Per nursing he has small open area on coccyx  NEURO:   Cranial nerves 2-12 are normal tested and grossly at patient's baseline  PSYCH:  oriented to himself, insight and judgement impaired, memory impaired , affect and mood normal, he has some diff expressing himself, looses the train of thought    Recent Labs:      Last Basic Metabolic Panel:  NA      136   11/15/2016   POTASSIUM      4.2   11/15/2016  CHLORIDE      103   11/15/2016  REGAN      8.2   11/15/2016  CO2       28   11/15/2016  BUN       41   11/15/2016  BUN     22.8   1/3/2011  CR     1.26   11/15/2016  GLC      190   11/15/2016    WBC      8.3   10/5/2016  RBC     3.47   10/5/2016  HGB     11.8   10/12/2016  HCT     29.9   10/5/2016  MCV       86   10/5/2016  MCH     27.1   10/5/2016  MCHC     31.4   10/5/2016  RDW     16.0   10/5/2016  PLT      199   10/5/2016    A1C      7.8   10/12/2016    Assessment/Plan:  Closed fracture of neck of left " femur with nonunion, subsequent encounter  He has been informed that he fracture will never heal because of the non-union    Chronic systolic congestive heart failure (H)  Cont with current tx    Chronic pain syndrome  Will cont with current meds per his wishes    Anxiety disorder, unspecified type  He is doing well with current tx> no change    Type 2 diabetes mellitus with stage 3 chronic kidney disease, without long-term current use of insulin (H)  Will cont with current Metformin  Because BGTs have been in a great range, I did d/c BGT checks    I informed his wife of the change    CKD (chronic kidney disease) stage 3, GFR 30-59 ml/min  Will renally adjust meds as indicated    Hypertension goal BP (blood pressure) < 140/90  No change in tx    Acute gouty arthritis> R wrist and elbow  Will cont with low dose Prednisone      Orders:  See A&P    Time 40 min    Electronically signed by  MARINA Alvarado CNP

## 2017-01-31 ENCOUNTER — MEDICAL CORRESPONDENCE (OUTPATIENT)
Dept: HEALTH INFORMATION MANAGEMENT | Facility: CLINIC | Age: 82
End: 2017-01-31

## 2017-02-01 ENCOUNTER — NURSING HOME VISIT (OUTPATIENT)
Dept: GERIATRICS | Facility: CLINIC | Age: 82
End: 2017-02-01
Payer: MEDICARE

## 2017-02-01 DIAGNOSIS — I48.0 PAROXYSMAL ATRIAL FIBRILLATION (H): Primary | ICD-10-CM

## 2017-02-01 DIAGNOSIS — Z79.01 LONG-TERM (CURRENT) USE OF ANTICOAGULANTS: ICD-10-CM

## 2017-02-01 PROCEDURE — 99307 SBSQ NF CARE SF MDM 10: CPT | Mod: GV | Performed by: NURSE PRACTITIONER

## 2017-02-01 PROCEDURE — 99207 ZZC CDG-CORRECTLY CODED, REVIEWED AND AGREE: CPT | Performed by: NURSE PRACTITIONER

## 2017-02-01 NOTE — PROGRESS NOTES
[facility]    HPI:    Suad Sloan is a 86 year old  (12/23/1930), who is being seen today for an episodic care visit at [facility]. Today's concern is INR/Coumadin management for A. Fib    Bleeding Signs/Symptoms:  None  Thromboembolic Signs/Symptoms:  None    Medication Changes:  No  Dietary Changes:  No  Activity Changes: No  Bacterial/Viral Infection:  No    Missed Coumadin Doses:  None    Other Concerns:  No      OBJECTIVE:    INR Today:  2.0  Current Dose:  4.75 mg po daily    ASSESSMENT:    Therapeutic INR for goal of 2-3    PLAN:    New Dose: No Change      Next INR: 3 weeks    Caro Warner NP

## 2017-02-03 ENCOUNTER — NURSING HOME VISIT (OUTPATIENT)
Dept: FAMILY MEDICINE | Facility: CLINIC | Age: 82
End: 2017-02-03
Payer: MEDICARE

## 2017-02-03 VITALS
HEART RATE: 68 BPM | SYSTOLIC BLOOD PRESSURE: 122 MMHG | WEIGHT: 142 LBS | BODY MASS INDEX: 21.03 KG/M2 | HEIGHT: 69 IN | RESPIRATION RATE: 18 BRPM | TEMPERATURE: 97.2 F | DIASTOLIC BLOOD PRESSURE: 64 MMHG | OXYGEN SATURATION: 96 %

## 2017-02-03 DIAGNOSIS — M1A.0390 CHRONIC GOUT OF WRIST, UNSPECIFIED CAUSE, UNSPECIFIED LATERALITY: ICD-10-CM

## 2017-02-03 DIAGNOSIS — I48.0 PAROXYSMAL ATRIAL FIBRILLATION (H): ICD-10-CM

## 2017-02-03 DIAGNOSIS — F41.9 ANXIETY DISORDER, UNSPECIFIED TYPE: ICD-10-CM

## 2017-02-03 DIAGNOSIS — G62.9 NEUROPATHY: ICD-10-CM

## 2017-02-03 DIAGNOSIS — E11.40 TYPE 2 DIABETES MELLITUS WITH DIABETIC NEUROPATHY, WITHOUT LONG-TERM CURRENT USE OF INSULIN (H): ICD-10-CM

## 2017-02-03 DIAGNOSIS — S72.001P CLOSED RIGHT HIP FRACTURE, WITH MALUNION, SUBSEQUENT ENCOUNTER: ICD-10-CM

## 2017-02-03 DIAGNOSIS — G89.4 CHRONIC PAIN SYNDROME: Primary | ICD-10-CM

## 2017-02-03 DIAGNOSIS — I10 HYPERTENSION GOAL BP (BLOOD PRESSURE) < 140/90: ICD-10-CM

## 2017-02-03 PROCEDURE — 99309 SBSQ NF CARE MODERATE MDM 30: CPT | Mod: GW | Performed by: INTERNAL MEDICINE

## 2017-02-03 NOTE — PROGRESS NOTES
[place] [facility]    Chief Complaint   Patient presents with     [facility]ent[facility] Regulatory       HPI:    Suad Sloan is a 86 year old  (12/23/1930), who is being seen today for a federally mandated E/M visit at [facility]. Today's concerns are:  1. Anxiety disorder, unspecified type    2. Paroxysmal atrial fibrillation (H)    3. Hypertension goal BP (blood pressure) < 140/90    4. Type 2 diabetes mellitus with diabetic neuropathy, without long-term current use of insulin (H)    5. 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 Chronic pain syndrome    7. Closed right hip fracture, with malunion, subsequent encounter    8. 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  PAST SURGICAL HISTORY:  has past surgical history that includes Cholecystectomy (1997); Appendectomy open (1970's); Colonoscopy (9/05, 4/07, 2/11); Colon surgery (9/05); AAA repair / Umbilical hernia repair (6/06); cataract iol, rt/lt (Right, 1/09); cataract iol, rt/lt (Left, 2/09);  lysis of adhesions, repair of incisional hernias (2/10); Implant pacemaker (09-16-11); FABRIC WRAPPING OF ABDOMINAL ANEURYSM; Endovascular repair aneurysm abdominal aorta (N/A, 12/16/2015); and Esophagoscopy, gastroscopy, duodenoscopy (EGD), combined (N/A, 3/28/2016).  FAMILY HISTORY: family history includes CANCER in his brother and sister; DIABETES in his paternal grandmother; HEART DISEASE (age of onset: 70) in his father; HEART DISEASE (age of onset: 80) in his mother.  SOCIAL HISTORY:  reports that he quit smoking about 6 years ago. His smoking use included Cigarettes. He has a 50 pack-year smoking history. He has never used smokeless tobacco. He reports that he does not drink alcohol or use illicit drugs.    MEDICATIONS:  Current Outpatient Prescriptions   Medication Sig Dispense Refill     acetaminophen (TYLENOL) 325 MG tablet Take 325-650 mg by mouth every 4 hours as needed for mild pain       Potassium Chloride ER 20 MEQ TBCR Take 1 tablet (20 mEq) by mouth daily 90 tablet 1     furosemide (LASIX) 20 MG tablet Take 0.5 tablets (10 mg) by mouth daily 30 tablet prn     PREDNISONE PO Take 5 mg by mouth daily       HydrOXYzine Pamoate (VISTARIL PO) Take 25 mg by mouth every 12 hours And 1 prn dose in 24 hrs       sennosides (SENOKOT) 8.6 MG tablet Take 2 tablets by mouth every 12 hours       Warfarin Sodium (COUMADIN PO) Take 4.75 mg by mouth daily        OXYCODONE HCL PO Take 5 mg by mouth every 3 hours Around the clock       METFORMIN HCL PO Take 250 mg by mouth 2 times daily (with meals)       Gabapentin (NEURONTIN PO) Take 300 mg by mouth At Bedtime       ATROPINE SULFATE PO Place 2 drops under the tongue every 2 hours as needed       HALOPERIDOL PO Take 0.5 mg by mouth every 6 hours as needed       DULOXETINE HCL PO Take 60 mg by mouth At Bedtime       guaiFENesin (ROBITUSSIN) 100 MG/5ML SYRP Take 10 mLs by mouth every 4 hours as needed for cough       ferrous sulfate (IRON) 325 (65 FE) MG tablet Take 325  "mg by mouth 2 times daily Give with orange juice       triamcinolone (KENALOG) 0.1 % ointment Apply topically 2 times daily Apply to Both lower extremities topically every day and evening shift       ammonium lactate (LAC-HYDRIN) 12 % lotion Apply topically 2 times daily       bisacodyl (DULCOLAX) 10 MG suppository Place 1 suppository (10 mg) rectally daily as needed for constipation 30 suppository      omeprazole (PRILOSEC) 40 MG capsule Take 1 capsule (40 mg) by mouth daily Take 30-60 minutes before a meal. 90 capsule 3     carvedilol (COREG) 12.5 MG tablet Take 6.25 mg by mouth 2 times daily (with meals)       albuterol (PROAIR HFA, PROVENTIL HFA, VENTOLIN HFA) 108 (90 BASE) MCG/ACT inhaler Inhale 2 puffs into the lungs 2 times daily as needed for shortness of breath / dyspnea or wheezing       tamsulosin (FLOMAX) 0.4 MG 24 hr capsule TAKE ONE CAPSULE BY MOUTH EVERY DAY 90 capsule 3     Medications reviewed:  Medications reconciled to facility chart and changes were made to reflect current medications as identified as above med list. Below are the changes that were made:   Medications stopped since last EPIC medication reconciliation:   There are no discontinued medications.    Medications started since last [facility] medication reconciliation:  No orders of the defined types were placed in this encounter.         Information reviewed:  Medications, vital signs, orders, and nursing notes.    ROS:  10 point ROS of systems including Constitutional, Eyes, Respiratory, Cardiovascular, Gastroenterology, Genitourinary, Integumentary, Muscularskeletal, Psychiatric were all negative except for pertinent positives noted in my HPI.    Exam:  /64 mmHg  Pulse 68  Temp(Src) 97.2  F (36.2  C)  Resp 18  Ht 5' 9\" (1.753 m)  Wt 142 lb (64.411 kg)  BMI 20.96 kg/m2  SpO2 96%  GENERAL APPEARANCE:  Alert, in no distress; conversant  NECK:  No adenopathy,masses or thyromegaly  RESP:  lungs clear to auscultation , no " respiratory distress  CV:  Palpation and auscultation of heart done , regular rate and rhythm,- I do not appreciate Afib on exam   ABDOMEN:  normal bowel sounds, soft, nontender,   M/S:   Lying in bed; he is able to move all extremities but does note left hip pain with more movement; he is a angela transfer.  NEURO:   Neuropathic pain noted;   PSYCH:  oriented X 3, affect and mood appropriate    Lab/Diagnostic data:      Last Basic Metabolic Panel:  NA      136   11/15/2016   POTASSIUM      4.2   11/15/2016  CHLORIDE      103   11/15/2016  REGAN      8.2   11/15/2016  CO2       28   11/15/2016  BUN       41   11/15/2016  BUN     22.8   1/3/2011  CR     1.26   11/15/2016  GLC      190   11/15/2016    WBC      8.3   10/5/2016  RBC     3.47   10/5/2016  HGB     11.8   10/12/2016  HCT     29.9   10/5/2016  MCV       86   10/5/2016  MCH     27.1   10/5/2016  MCHC     31.4   10/5/2016  RDW     16.0   10/5/2016  PLT      199   10/5/2016    ASSESSMENT/PLAN  (F41.9) Anxiety disorder, unspecified type  (primary encounter diagnosis)  Comment: added Hydroxyzine BID and has been very effective  Plan: meds reviewed; overall, improving    (I48.0) Paroxysmal atrial fibrillation (H)  Comment: warfarin for stroke prevention; and rate controlled  Plan: no changes in meds anticipated.    (I10) Hypertension goal BP (blood pressure) < 140/90  Comment:   BP Readings from Last 3 Encounters:   02/03/17 122/64   01/25/17 119/66   12/09/16 124/64    BLOOD PRESSURE has been well controlled  Plan: no changes in meds    (E11.40) Type 2 diabetes mellitus with diabetic neuropathy, without long-term current use of insulin (H)  Comment: low dose Metformin has been effective to control blood sugars; A1C      7.8   10/12/2016  Plan: check A1C to see benefits from low dose Metformin.    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 high tolerance- past chem dep hx  Plan: pt reports more subacute pain with movement and repositioning of left hip;     (S72.001P) Closed right hip fracture, with malunion, subsequent encounter  Comment: previously not felt to be a surgical candidate; was previously evaluated in the Hospital and by Ortho  He is interested in returning to Ortho for reassessment;  His CHF is much improved;  Surgery would still be high risk but if it would improve his mobility, less pain and quality of life, it may be worth evaluation with Ortho; he would consider [facility]- his wife has been there.  Of surgical procedure is felt beneficial,, would need EKG and ECHO to assist to perioperative evaluation.  Plan: Ronal lift transfer; unable to bear weight; not a therapy candidate due to inability to bear weight    (M1A.0390) Chronic gout of wrist, unspecified cause, unspecified laterality  Comment: has impacted shoulder, elbow and wrist.   Plan: low dose Prednisone;     Bridget Valenzuela MD  Internal Medicine  electronically signed

## 2017-02-13 ENCOUNTER — HOSPITAL LABORATORY (OUTPATIENT)
Dept: OTHER | Facility: CLINIC | Age: 82
End: 2017-02-13

## 2017-02-13 LAB
ANION GAP SERPL CALCULATED.3IONS-SCNC: 8 MMOL/L (ref 3–14)
BUN SERPL-MCNC: 33 MG/DL (ref 7–30)
CALCIUM SERPL-MCNC: 8.6 MG/DL (ref 8.5–10.1)
CHLORIDE SERPL-SCNC: 108 MMOL/L (ref 94–109)
CO2 SERPL-SCNC: 25 MMOL/L (ref 20–32)
CREAT SERPL-MCNC: 1.34 MG/DL (ref 0.66–1.25)
ERYTHROCYTE [DISTWIDTH] IN BLOOD BY AUTOMATED COUNT: 14.4 % (ref 10–15)
GFR SERPL CREATININE-BSD FRML MDRD: 51 ML/MIN/1.7M2
GLUCOSE SERPL-MCNC: 88 MG/DL (ref 70–99)
HCT VFR BLD AUTO: 30 % (ref 40–53)
HGB BLD-MCNC: 9.5 G/DL (ref 13.3–17.7)
MCH RBC QN AUTO: 28.9 PG (ref 26.5–33)
MCHC RBC AUTO-ENTMCNC: 31.7 G/DL (ref 31.5–36.5)
MCV RBC AUTO: 91 FL (ref 78–100)
PLATELET # BLD AUTO: 154 10E9/L (ref 150–450)
POTASSIUM SERPL-SCNC: 4 MMOL/L (ref 3.4–5.3)
RBC # BLD AUTO: 3.29 10E12/L (ref 4.4–5.9)
SODIUM SERPL-SCNC: 141 MMOL/L (ref 133–144)
WBC # BLD AUTO: 7.1 10E9/L (ref 4–11)

## 2017-02-17 DIAGNOSIS — Z79.899 ENCOUNTER FOR MEDICATION REVIEW: Primary | ICD-10-CM

## 2017-02-22 ENCOUNTER — NURSING HOME VISIT (OUTPATIENT)
Dept: GERIATRICS | Facility: CLINIC | Age: 82
End: 2017-02-22
Payer: COMMERCIAL

## 2017-02-22 DIAGNOSIS — Z79.01 LONG-TERM (CURRENT) USE OF ANTICOAGULANTS: ICD-10-CM

## 2017-02-22 DIAGNOSIS — I48.0 PAROXYSMAL ATRIAL FIBRILLATION (H): Primary | ICD-10-CM

## 2017-02-22 PROCEDURE — 99307 SBSQ NF CARE SF MDM 10: CPT | Performed by: NURSE PRACTITIONER

## 2017-02-22 PROCEDURE — 99207 ZZC CDG-CORRECTLY CODED, REVIEWED AND AGREE: CPT | Performed by: NURSE PRACTITIONER

## 2017-02-22 NOTE — PROGRESS NOTES
[place] [facility]    HPI:    Suad Sloan is a 86 year old  (12/23/1930), who is being seen today for an episodic care visit at [facility]. Today's concern is INR/Coumadin management for A. Fib    Bleeding Signs/Symptoms:  None  Thromboembolic Signs/Symptoms:  None    Medication Changes:  No  Dietary Changes:  No  Activity Changes: No  Bacterial/Viral Infection:  No    Missed Coumadin Doses:  None    Other Concerns:  No      OBJECTIVE:    INR Today:  2.3  Current Dose:  4.75 mg po daily    ASSESSMENT:    Therapeutic INR for goal of 2-3    PLAN:    New Dose: No Change      Next INR: 1 month    Caro Warner NP

## 2017-03-15 ENCOUNTER — NURSING HOME VISIT (OUTPATIENT)
Dept: GERIATRICS | Facility: CLINIC | Age: 82
End: 2017-03-15
Payer: COMMERCIAL

## 2017-03-15 DIAGNOSIS — G62.9 NEUROPATHY: ICD-10-CM

## 2017-03-15 DIAGNOSIS — I48.0 PAROXYSMAL ATRIAL FIBRILLATION (H): ICD-10-CM

## 2017-03-15 DIAGNOSIS — S72.002K CLOSED FRACTURE OF NECK OF LEFT FEMUR WITH NONUNION, SUBSEQUENT ENCOUNTER: Primary | ICD-10-CM

## 2017-03-15 PROCEDURE — 99207 ZZC CDG-CORRECTLY CODED, REVIEWED AND AGREE: CPT | Performed by: NURSE PRACTITIONER

## 2017-03-15 PROCEDURE — 99308 SBSQ NF CARE LOW MDM 20: CPT | Performed by: NURSE PRACTITIONER

## 2017-03-15 NOTE — PROGRESS NOTES
[place] [facility]    Chief Complaint   Patient presents with     Pain     INR RESULTS       HPI:    Suad Sloan is a 86 year old  (12/23/1930), who is being seen today for an episodic care visit at [facility].   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, subsequent encounter  He has suddenly more pain in the L hip> thinks that he spent too much time in the w/c and out of bed    Neuropathy (H)  Has reported to his wife that he has pain into the L knee    Paroxysmal atrial fibrillation (H)  AP> RRR> he is on coumadin      REVIEW OF SYSTEMS:  4 point ROS including Respiratory, CV, GI and , other than that noted in the HPI,  is negative    GENERAL APPEARANCE:  Alert, in no distress  He actually looked very comfortable, but reported to me that he is rating his pain @ 6  Where before it was 2  He is resting in bed with the L foot outward rotated and the L LE slightly shorter then R  He demands a different medication     AP ~76 RRR    Assessment/Plan  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

## 2017-03-27 ENCOUNTER — NURSING HOME VISIT (OUTPATIENT)
Dept: GERIATRICS | Facility: CLINIC | Age: 82
End: 2017-03-27
Payer: COMMERCIAL

## 2017-03-27 VITALS
HEART RATE: 64 BPM | OXYGEN SATURATION: 97 % | DIASTOLIC BLOOD PRESSURE: 59 MMHG | RESPIRATION RATE: 18 BRPM | WEIGHT: 149.6 LBS | TEMPERATURE: 97.7 F | HEIGHT: 69 IN | SYSTOLIC BLOOD PRESSURE: 124 MMHG | BODY MASS INDEX: 22.16 KG/M2

## 2017-03-27 DIAGNOSIS — F41.9 ANXIETY DISORDER, UNSPECIFIED TYPE: ICD-10-CM

## 2017-03-27 DIAGNOSIS — J44.9 COPD, MODERATE (H): ICD-10-CM

## 2017-03-27 DIAGNOSIS — R06.02 SOB (SHORTNESS OF BREATH): Primary | ICD-10-CM

## 2017-03-27 DIAGNOSIS — Z87.09 PERSONAL HISTORY OF ASBESTOSIS: ICD-10-CM

## 2017-03-27 PROCEDURE — 99308 SBSQ NF CARE LOW MDM 20: CPT | Performed by: NURSE PRACTITIONER

## 2017-03-27 PROCEDURE — 99207 ZZC CDG-CORRECTLY CODED, REVIEWED AND AGREE: CPT | Performed by: NURSE PRACTITIONER

## 2017-03-27 NOTE — PROGRESS NOTES
[place] [facility]    Chief Complaint   Patient presents with     Shortness of Breath       HPI:    Suad Sloan is a 86 year old  (12/23/1930), who is being seen today for an episodic care visit at [facility].   Today's concern is:his wife left a v/m for me stating that Suad has been SOB and staff are not taking care of him  SOB (shortness of breath)  Onset ~ 6 am today    Personal history of asbestosis  Suad told me today that he was exposed to asbestos @ work ion the past    COPD, moderate (H)  He smoked until his current admission    Anxiety disorder, unspecified type  He easily becomes very anxious and also has a short temper      ALLERGIES: Ace inhibitors; Cleocin; Dulera; Erythromycin; Hydralazine; Imdur [isosorbide]; Methadone; Penicillins; and Spiriva handihaler  Past Medical, Surgical, Family and Social History reviewed and updated in EPIC.    Current Outpatient Prescriptions   Medication Sig Dispense Refill     HYDROmorphone HCl (DILAUDID PO) Take 2 mg by mouth every 3 hours       acetaminophen (TYLENOL) 325 MG tablet Take 325-650 mg by mouth every 4 hours as needed for mild pain       Potassium Chloride ER 20 MEQ TBCR Take 1 tablet (20 mEq) by mouth daily 90 tablet 1     furosemide (LASIX) 20 MG tablet Take 0.5 tablets (10 mg) by mouth daily 30 tablet prn     PREDNISONE PO Take 5 mg by mouth daily       HydrOXYzine Pamoate (VISTARIL PO) Take 25 mg by mouth every 12 hours And 1 prn dose in 24 hrs       sennosides (SENOKOT) 8.6 MG tablet Take 2 tablets by mouth every 12 hours       Warfarin Sodium (COUMADIN PO) Take 4.75 mg by mouth daily        METFORMIN HCL PO Take 250 mg by mouth 2 times daily (with meals)       Gabapentin (NEURONTIN PO) Take 300 mg by mouth every 12 hours Reported on 3/27/2017       DULOXETINE HCL PO Take 60 mg by mouth At Bedtime       guaiFENesin (ROBITUSSIN) 100 MG/5ML SYRP Take 10 mLs by mouth every 4 hours as needed for cough       ferrous  "sulfate (IRON) 325 (65 FE) MG tablet Take 325 mg by mouth 2 times daily Give with orange juice       triamcinolone (KENALOG) 0.1 % ointment Apply topically 2 times daily Apply to Both lower extremities topically every day and evening shift       ammonium lactate (LAC-HYDRIN) 12 % lotion Apply topically 2 times daily       omeprazole (PRILOSEC) 40 MG capsule Take 1 capsule (40 mg) by mouth daily Take 30-60 minutes before a meal. 90 capsule 3     carvedilol (COREG) 12.5 MG tablet Take 6.25 mg by mouth 2 times daily (with meals)       albuterol (PROAIR HFA, PROVENTIL HFA, VENTOLIN HFA) 108 (90 BASE) MCG/ACT inhaler Inhale 2 puffs into the lungs 2 times daily as needed for shortness of breath / dyspnea or wheezing       tamsulosin (FLOMAX) 0.4 MG 24 hr capsule TAKE ONE CAPSULE BY MOUTH EVERY DAY 90 capsule 3     Medications reviewed:  Medications reconciled to facility chart and changes were made to reflect current medications as identified as above med list. Below are the changes that were made:   Medications stopped since last EPIC medication reconciliation:   Medications Discontinued During This Encounter   Medication Reason     bisacodyl (DULCOLAX) 10 MG suppository Therapy completed       Medications started since last [facility] medication reconciliation:  No orders of the defined types were placed in this encounter.      REVIEW OF SYSTEMS:  4 point ROS including Respiratory, CV, GI and , other than that noted in the HPI,  is negative    Physical Exam:  /59  Pulse 64  Temp 97.7  F (36.5  C)  Resp 18  Ht 5' 9\" (1.753 m)  Wt 149 lb 9.6 oz (67.9 kg)  SpO2 97%  BMI 22.09 kg/m2  GENERAL APPEARANCE:  Somnolent, he was deep asleep when I visited him  I had to touch him for him to wake up  ENT:  Mouth and posterior oropharynx normal, moist mucous membranes, [place]  EYES:  EOM, conjunctivae, lids, pupils and irises normal  RESP:  respiratory effort and palpation of chest normal, lungs clear to auscultation , no " respiratory distress > he did have 02 @ 2L on  He stated that he received the prn Albuterol MDI and he believed that it helped him  He did want to keep the 02 on for a while  CV:  Palpation and auscultation of heart done , regular rate and rhythm, no murmur, rub, or gallop, no edema  ABDOMEN:  normal bowel sounds, soft, nontender, no hepatosplenomegaly or other masses  M/S:   Gait and station abnormal > he was resting on his bed and looked comfortable  SKIN:  Soft, intact and normal temp  NEURO:   Cranial nerves 2-12 are normal tested and grossly at patient's baseline, while he was sleeping he had involuntary muscle twitching in his limbs  PSYCH:  oriented X 3, impatient and unhappy, anxious    Recent Labs:      Last Basic Metabolic Panel:  Lab Results   Component Value Date     02/13/2017      Lab Results   Component Value Date    POTASSIUM 4.0 02/13/2017     Lab Results   Component Value Date    CHLORIDE 108 02/13/2017     Lab Results   Component Value Date    REGAN 8.6 02/13/2017     Lab Results   Component Value Date    CO2 25 02/13/2017     Lab Results   Component Value Date    BUN 33 02/13/2017     Lab Results   Component Value Date    CR 1.34 02/13/2017     Lab Results   Component Value Date    GLC 88 02/13/2017     Lab Results   Component Value Date    WBC 7.1 02/13/2017     Lab Results   Component Value Date    RBC 3.29 02/13/2017     Lab Results   Component Value Date    HGB 9.5 02/13/2017     Lab Results   Component Value Date    HCT 30.0 02/13/2017     Lab Results   Component Value Date    MCV 91 02/13/2017     Lab Results   Component Value Date    MCH 28.9 02/13/2017     Lab Results   Component Value Date    MCHC 31.7 02/13/2017     Lab Results   Component Value Date    RDW 14.4 02/13/2017     Lab Results   Component Value Date     02/13/2017         Assessment/Plan:  SOB (shortness of breath)  He may use 02 @ 2L whenever he feels SOB even if the 02 sat are normal    Personal history of  asbestosis  Will keep in mind for the future> I did review chest x-rays> they were only positive for COPD    COPD, moderate (H)  Will cont with current prn Albuterol MDI    Anxiety disorder, unspecified type  Cont with current prn Vistaril    I did talk with his wife  Orders:  See A&P    Time 30  min    Electronically signed by  April Sloan

## 2017-04-12 ENCOUNTER — NURSING HOME VISIT (OUTPATIENT)
Dept: GERIATRICS | Facility: CLINIC | Age: 82
End: 2017-04-12
Payer: COMMERCIAL

## 2017-04-12 VITALS
SYSTOLIC BLOOD PRESSURE: 124 MMHG | HEART RATE: 64 BPM | BODY MASS INDEX: 21.98 KG/M2 | HEIGHT: 69 IN | RESPIRATION RATE: 18 BRPM | TEMPERATURE: 97.7 F | DIASTOLIC BLOOD PRESSURE: 59 MMHG | OXYGEN SATURATION: 97 % | WEIGHT: 148.4 LBS

## 2017-04-12 DIAGNOSIS — J44.9 COPD, MODERATE (H): ICD-10-CM

## 2017-04-12 DIAGNOSIS — F41.9 ANXIETY DISORDER, UNSPECIFIED TYPE: Primary | ICD-10-CM

## 2017-04-12 DIAGNOSIS — Z87.311: ICD-10-CM

## 2017-04-12 DIAGNOSIS — G62.9 NEUROPATHY: ICD-10-CM

## 2017-04-12 DIAGNOSIS — I48.0 PAROXYSMAL ATRIAL FIBRILLATION (H): ICD-10-CM

## 2017-04-12 DIAGNOSIS — G89.29 OTHER CHRONIC PAIN: ICD-10-CM

## 2017-04-12 DIAGNOSIS — E11.40 TYPE 2 DIABETES MELLITUS WITH DIABETIC NEUROPATHY, WITHOUT LONG-TERM CURRENT USE OF INSULIN (H): ICD-10-CM

## 2017-04-12 PROCEDURE — 99309 SBSQ NF CARE MODERATE MDM 30: CPT | Performed by: NURSE PRACTITIONER

## 2017-04-12 PROCEDURE — 99207 ZZC CDG-CORRECTLY CODED, REVIEWED AND AGREE: CPT | Performed by: NURSE PRACTITIONER

## 2017-04-12 NOTE — PROGRESS NOTES
[place] [facility]    Chief Complaint   Patient presents with     assisted Regulatory       HPI:    Suad Sloan is a 86 year old  (12/23/1930), who is being seen today for a federally mandated E/M visit at [facility]. 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  He stated that he sleeps better, did not complain of pain in his LE    Other chronic pain  He stated that the change form Oxycodone to Dilaudid was good> pain managed much better    COPD, moderate (H)  He stated that when he supine in bed at night he occ becomes SOB> uses the prn Albuterol MDI and the cough syrup> they are effective      ALLERGIES: Ace inhibitors; Cleocin; Dulera; Erythromycin; Hydralazine; Imdur [isosorbide]; Methadone; Penicillins; and Spiriva handihaler  PAST MEDICAL HISTORY:  has a past medical history of AAA (abdominal aortic aneurysm) (H) (9/05); Alcohol dependence (H); Anxiety; Arthritis of hands; Atrial fibrillation (H); Bladder cancer (H) (5/06); Cardiomyopathy (H); Chronic pain; Chronic systolic congestive heart failure (H); CKD (chronic kidney disease) stage 3, GFR 30-59 ml/min; Closed fracture of neck of left femur (H) (9/26/2016); Colon cancer (H) (9/05); COPD, moderate (H); Coronary atherosclerosis (1995); Depression; Diverticulosis of colon (without mention of hemorrhage); Hyperlipidemia; Hypertension; Iliac artery aneurysm, left (H); Iron deficiency anemia  (7/05); Left leg cellulitis (7/4/2016); Malignant neoplasm of prostate (H) (1994); Peripheral neuropathy (H); Protein deficiency (H), Albumin2.4  7- (7/25/2016); Pulmonary hypertension (H); Respiratory failure (H) (12/5/2014); Restless leg syndrome; Sleep apnea (6/10); 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  PAST SURGICAL HISTORY:  has a past surgical history that includes Cholecystectomy (1997); Appendectomy open (1970's); Colonoscopy (9/05, 4/07, 2/11); Colon surgery (9/05); AAA repair / Umbilical hernia repair (6/06); cataract iol, rt/lt (Right, 1/09); cataract iol, rt/lt (Left, 2/09); lysis of adhesions, repair of incisional hernias (2/10); Implant pacemaker (09-16-11); FABRIC WRAPPING OF ABDOMINAL ANEURYSM; Endovascular repair aneurysm abdominal aorta (N/A, 12/16/2015); and Esophagoscopy, gastroscopy, duodenoscopy (EGD), combined (N/A, 3/28/2016).  FAMILY HISTORY: family history includes CANCER in his brother and sister; DIABETES in his paternal grandmother; HEART DISEASE (age of onset: 70) in his father; HEART DISEASE (age of onset: 80) in his mother.  SOCIAL HISTORY:  reports that he quit smoking about 7 years ago. His smoking use included Cigarettes. He has a 50.00 pack-year smoking history. He has never used smokeless tobacco. He reports that he does not drink alcohol or use illicit drugs.    MEDICATIONS:  Current Outpatient Prescriptions   Medication Sig Dispense Refill     OSELTAMIVIR PHOSPHATE PO Take 30 mg by mouth daily for 10 Days       HYDROmorphone HCl (DILAUDID PO) Take 2 mg by mouth every 3 hours       acetaminophen (TYLENOL) 325 MG tablet Take 325-650 mg by mouth every 4 hours as needed for mild pain       Potassium Chloride ER 20 MEQ TBCR Take 1 tablet (20 mEq) by mouth daily 90 tablet 1     furosemide (LASIX) 20 MG tablet Take 0.5 tablets (10 mg) by mouth daily 30 tablet prn     PREDNISONE PO Take 5 mg by mouth daily       HydrOXYzine Pamoate  (VISTARIL PO) Take 25 mg by mouth every 12 hours And 1 prn dose in 24 hrs       sennosides (SENOKOT) 8.6 MG tablet Take 2 tablets by mouth every 12 hours       Warfarin Sodium (COUMADIN PO) Take 4.75 mg by mouth daily        METFORMIN HCL PO Take 250 mg by mouth 2 times daily (with meals)       Gabapentin (NEURONTIN PO) Take 300 mg by mouth every 12 hours Reported on 3/27/2017       DULOXETINE HCL PO Take 60 mg by mouth At Bedtime       guaiFENesin (ROBITUSSIN) 100 MG/5ML SYRP Take 10 mLs by mouth every 4 hours as needed for cough       ferrous sulfate (IRON) 325 (65 FE) MG tablet Take 325 mg by mouth 2 times daily Give with orange juice       triamcinolone (KENALOG) 0.1 % ointment Apply topically 2 times daily Apply to Both lower extremities topically every day and evening shift       ammonium lactate (LAC-HYDRIN) 12 % lotion Apply topically 2 times daily       omeprazole (PRILOSEC) 40 MG capsule Take 1 capsule (40 mg) by mouth daily Take 30-60 minutes before a meal. 90 capsule 3     carvedilol (COREG) 12.5 MG tablet Take 6.25 mg by mouth 2 times daily (with meals)       albuterol (PROAIR HFA, PROVENTIL HFA, VENTOLIN HFA) 108 (90 BASE) MCG/ACT inhaler Inhale 2 puffs into the lungs 2 times daily as needed for shortness of breath / dyspnea or wheezing       tamsulosin (FLOMAX) 0.4 MG 24 hr capsule TAKE ONE CAPSULE BY MOUTH EVERY DAY 90 capsule 3     Medications reviewed:  Medications reconciled to facility chart and changes were made to reflect current medications as identified as above med list. Below are the changes that were made:   Medications stopped since last EPIC medication reconciliation:   There are no discontinued medications.    Medications started since last [facility] medication reconciliation:  Orders Placed This Encounter   Medications     OSELTAMIVIR PHOSPHATE PO     Sig: Take 30 mg by mouth daily for 10 Days       Case Management:  I have reviewed the care plan and MDS and do agree with the plan.  "Patient's desire to return to the community is present, but is not able due to care needs .  Information reviewed:  Medications, vital signs, orders, and nursing notes.    ROS:  4 point ROS including Respiratory, CV, GI and , other than that noted in the HPI,  is negative    Exam:  /59  Pulse 64  Temp 97.7  F (36.5  C)  Resp 18  Ht 5' 9\" (1.753 m)  Wt 148 lb 6.4 oz (67.3 kg)  SpO2 97%  BMI 21.91 kg/m2  GENERAL APPEARANCE:  Alert, in no distress, cooperative  ENT:  Mouth and posterior oropharynx normal, moist mucous membranes, [place], does well in 1:1  EYES:  EOM, conjunctivae, lids, pupils and irises normal  RESP:  respiratory effort and palpation of chest normal, lungs clear to auscultation , no respiratory distress  CV:  Palpation and auscultation of heart done , regular rate and rhythm, no murmur, rub, or gallop, no edema  ABDOMEN:  normal bowel sounds, soft, nontender, no hepatosplenomegaly or other masses  M/S:   Gait and station abnormal > he is transferred by Ronal and sits in a recliner, is non weight bearing  SKIN:  Soft and intact  NEURO:   Cranial nerves 2-12 are normal tested and grossly at patient's baseline  PSYCH:  oriented to himself and his surroundings , insight and judgement impaired, memory impaired , affect and mood normal, he told me that he will invent a machine that will allow him to get out of bed by himself    Lab/Diagnostic data:      Last Basic Metabolic Panel:  Lab Results   Component Value Date     02/13/2017      Lab Results   Component Value Date    POTASSIUM 4.0 02/13/2017     Lab Results   Component Value Date    CHLORIDE 108 02/13/2017     Lab Results   Component Value Date    REGAN 8.6 02/13/2017     Lab Results   Component Value Date    CO2 25 02/13/2017     Lab Results   Component Value Date    BUN 33 02/13/2017     Lab Results   Component Value Date    CR 1.34 02/13/2017     Lab Results   Component Value Date    GLC 88 02/13/2017     Lab Results   Component " Value Date    WBC 7.1 02/13/2017     Lab Results   Component Value Date    RBC 3.29 02/13/2017     Lab Results   Component Value Date    HGB 9.5 02/13/2017     Lab Results   Component Value Date    HCT 30.0 02/13/2017     Lab Results   Component Value Date    MCV 91 02/13/2017     Lab Results   Component Value Date    MCH 28.9 02/13/2017     Lab Results   Component Value Date    MCHC 31.7 02/13/2017     Lab Results   Component Value Date    RDW 14.4 02/13/2017     Lab Results   Component Value Date     02/13/2017         ASSESSMENT/PLAN  Anxiety disorder, unspecified type  Will cont with current tx since it is beneficial> decrease may cause emotional harm    H/O healed pathologic fracture  I talked with him about the fact that he is not s surgical candidate, he is reluctant to accept that    Paroxysmal atrial fibrillation (H)  Will cont with coumadin> keep INR 2-3  INR today was 2.6>  No change  In tx  Next INR in 4 weeks    Type 2 diabetes mellitus with diabetic neuropathy, without long-term current use of insulin (H)  Will obtain A1c next week> cont with current tx    Neuropathy (H), 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    Electronically signed by:  MARINA Alvarado CNP

## 2017-04-19 ENCOUNTER — HOSPITAL LABORATORY (OUTPATIENT)
Dept: OTHER | Facility: CLINIC | Age: 82
End: 2017-04-19

## 2017-04-19 LAB
ANION GAP SERPL CALCULATED.3IONS-SCNC: 6 MMOL/L (ref 3–14)
BUN SERPL-MCNC: 39 MG/DL (ref 7–30)
CALCIUM SERPL-MCNC: 8.7 MG/DL (ref 8.5–10.1)
CHLORIDE SERPL-SCNC: 104 MMOL/L (ref 94–109)
CO2 SERPL-SCNC: 30 MMOL/L (ref 20–32)
CREAT SERPL-MCNC: 1.45 MG/DL (ref 0.66–1.25)
ERYTHROCYTE [DISTWIDTH] IN BLOOD BY AUTOMATED COUNT: 14.4 % (ref 10–15)
GFR SERPL CREATININE-BSD FRML MDRD: 46 ML/MIN/1.7M2
GLUCOSE SERPL-MCNC: 87 MG/DL (ref 70–99)
HBA1C MFR BLD: 7 % (ref 4.3–6)
HCT VFR BLD AUTO: 30 % (ref 40–53)
HGB BLD-MCNC: 9.4 G/DL (ref 13.3–17.7)
MCH RBC QN AUTO: 29 PG (ref 26.5–33)
MCHC RBC AUTO-ENTMCNC: 31.3 G/DL (ref 31.5–36.5)
MCV RBC AUTO: 93 FL (ref 78–100)
PLATELET # BLD AUTO: 161 10E9/L (ref 150–450)
POTASSIUM SERPL-SCNC: 4.4 MMOL/L (ref 3.4–5.3)
RBC # BLD AUTO: 3.24 10E12/L (ref 4.4–5.9)
SODIUM SERPL-SCNC: 140 MMOL/L (ref 133–144)
WBC # BLD AUTO: 6.8 10E9/L (ref 4–11)

## 2017-04-25 ENCOUNTER — ALLIED HEALTH/NURSE VISIT (OUTPATIENT)
Dept: CARDIOLOGY | Facility: CLINIC | Age: 82
End: 2017-04-25
Payer: COMMERCIAL

## 2017-04-25 DIAGNOSIS — Z95.0 CARDIAC PACEMAKER IN SITU: Primary | ICD-10-CM

## 2017-04-25 PROCEDURE — 93296 REM INTERROG EVL PM/IDS: CPT | Performed by: INTERNAL MEDICINE

## 2017-04-25 PROCEDURE — 93294 REM INTERROG EVL PM/LDLS PM: CPT | Performed by: INTERNAL MEDICINE

## 2017-04-25 NOTE — PROGRESS NOTES
[employer] Sensia (S) Remote PPM Device Check  : 94 %, Chronic AFIB, taking Warfarin  Mode: VVIR        Presenting Rhythm:   Heart Rate: Adequate rates per histogram  Sensing: Stable    Pacing Threshold: Stable    Impedance: Stable  Battery Status: 3-5.5 years  Atrial Arrhythmia: N/A  Ventricular Arrhythmia: 2 ventricular high rates. Marker only EGMs show slightly irregular VS events suggesting RVR. Reviewed with HERMANN Saunders.       Care Plan: F/u PPM [facility] q 3 months. LM with results. ROSAURA RuffinT

## 2017-04-25 NOTE — MR AVS SNAPSHOT
After Visit Summary   4/25/2017    Suad Sloan    MRN: [id number]           Patient Information     Date Of Birth          12/23/1930        Visit Information        Provider Department      4/25/2017 4:30 PM ALONZO TECH1 [facility]        Today's Diagnoses     Cardiac pacemaker in situ    -  1       Follow-ups after your visit        Your next 10 appointments already scheduled     Apr 25, 2017  4:30 PM CDT   Remote PPM Check with ALONZO TECH1   [facility] ([facility])    [address] [place] [id number] [phone]           This appointment is for a remote check of your pacemaker.  This is not an appointment at the office.              Who to contact     If you have questions or need follow up information about today's clinic visit or your schedule please contact [facility] directly at [phone].  Normal or non-critical lab and imaging results will be communicated to you by [employer], letter or phone within 4 business days after the clinic has received the results. If you do not hear from us within 7 days, please contact the clinic through [employer] or phone. If you have a critical or abnormal lab result, we will notify you by phone as soon as possible.  Submit refill requests through [employer] or call your pharmacy and they will forward the refill request to us. Please allow 3 business days for your refill to be completed.          Additional Information About Your Visit        MyChart Information     [employer] gives you secure access to your electronic health record. If you see a primary care provider, you can also send messages to your care team and make appointments. If you have questions, please call your primary care clinic.  If you do not have a primary care provider, please call [phone] and they will assist you.        Care EveryWhere ID      This is your Care EveryWhere ID. This could be used by other organizations to access your [facility] medical records  [id number]         Blood Pressure from Last 3 Encounters:   04/12/17 124/59   03/27/17 124/59   02/03/17 122/64    Weight from Last 3 Encounters:   04/12/17 67.3 kg (148 lb 6.4 oz)   03/27/17 67.9 kg (149 lb 9.6 oz)   02/03/17 64.4 kg (142 lb)              We Performed the Following     INTERROGATION DEVICE EVAL REMOTE, PACER/ICD (87337)     PM DEVICE INTERROGATE REMOTE (25951)        Primary Care Provider Office Phone # Fax #    Caro Jensen MARINA Warner [phone]       [facility] [address]  [address]  [facility] [address]        Thank you!     Thank you for choosing [facility]  for your care. Our goal is always to provide you with excellent care. Hearing back from our patients is one way we can continue to improve our services. Please take a few minutes to complete the written survey that you may receive in the mail after your visit with us. Thank you!             Your Updated Medication List - Protect others around you: Learn how to safely use, store and throw away your medicines at www.disposemymeds.org.          This list is accurate as of: 4/25/17  2:52 PM.  Always use your most recent med list.                   Brand Name Dispense Instructions for use    acetaminophen 325 MG tablet    TYLENOL     Take 325-650 mg by mouth every 4 hours as needed for mild pain       albuterol 108 (90 BASE) MCG/ACT Inhaler    PROAIR HFA/PROVENTIL HFA/VENTOLIN HFA     Inhale 2 puffs into the lungs 2 times daily as needed for shortness of breath / dyspnea or wheezing       ammonium lactate 12 % lotion    LAC-HYDRIN     Apply topically 2 times daily       carvedilol 12.5 MG tablet    COREG     Take 6.25 mg by mouth 2 times daily (with meals)       COUMADIN PO      Take 4.75 mg by mouth daily       DILAUDID PO      Take 2 mg by mouth  every 3 hours       DULOXETINE HCL PO      Take 60 mg by mouth At Bedtime       ferrous sulfate 325 (65 FE) MG tablet    IRON     Take 325 mg by mouth 2 times daily Give with orange juice       furosemide 20 MG tablet    LASIX    30 tablet    Take 0.5 tablets (10 mg) by mouth daily       guaiFENesin 100 MG/5ML Syrp    ROBITUSSIN     Take 10 mLs by mouth every 4 hours as needed for cough       METFORMIN HCL PO      Take 250 mg by mouth 2 times daily (with meals)       NEURONTIN PO      Take 300 mg by mouth every 12 hours Reported on 3/27/2017       omeprazole 40 MG capsule    priLOSEC    90 capsule    Take 1 capsule (40 mg) by mouth daily Take 30-60 minutes before a meal.       Potassium Chloride ER 20 MEQ Tbcr     90 tablet    Take 1 tablet (20 mEq) by mouth daily       PREDNISONE PO      Take 5 mg by mouth daily       sennosides 8.6 MG tablet    SENOKOT     Take 2 tablets by mouth every 12 hours       tamsulosin 0.4 MG capsule    FLOMAX    90 capsule    TAKE ONE CAPSULE BY MOUTH EVERY DAY       triamcinolone 0.1 % ointment    KENALOG     Apply topically 2 times daily Apply to Both lower extremities topically every day and evening shift       VISTARIL PO      Take 25 mg by mouth every 12 hours And 1 prn dose in 24 hrs

## 2017-05-10 ENCOUNTER — NURSING HOME VISIT (OUTPATIENT)
Dept: GERIATRICS | Facility: CLINIC | Age: 82
End: 2017-05-10
Payer: COMMERCIAL

## 2017-05-10 DIAGNOSIS — I48.0 PAROXYSMAL ATRIAL FIBRILLATION (H): ICD-10-CM

## 2017-05-10 DIAGNOSIS — Z79.01 LONG-TERM (CURRENT) USE OF ANTICOAGULANTS: Primary | ICD-10-CM

## 2017-05-10 PROCEDURE — 99307 SBSQ NF CARE SF MDM 10: CPT | Performed by: NURSE PRACTITIONER

## 2017-05-10 NOTE — PROGRESS NOTES
[place] [facility]    HPI:    Suad Sloan is a 86 year old  (12/23/1930), who is being seen today for an episodic care visit at [facility]. Today's concern is INR/Coumadin management for A. Fib    Bleeding Signs/Symptoms:  None  Thromboembolic Signs/Symptoms:  None    Medication Changes:  No  Dietary Changes:  No  Activity Changes: No  Bacterial/Viral Infection:  No    Missed Coumadin Doses:  None    Other Concerns:  No      OBJECTIVE:    INR Today:  2.3  Current Dose:  4.75 mg po daily    ASSESSMENT:    Therapeutic INR for goal of 2-3    PLAN:    New Dose: No Change      Next INR: 1 month    Caro Warner NP

## 2017-05-23 NOTE — PROGRESS NOTES
[place] [facility]    Chief Complaint   Patient presents with     RECHECK       HPI:    Suad Sloan is a 86 year old  (12/23/1930), who is being seen today for an episodic care visit at [facility].  HPI information obtained from: facility chart records.  Today's concern is:F/u on current chronic health issues  Type 2 diabetes mellitus with diabetic neuropathy, without long-term current use of insulin (H)  He wanted to know why I did not have his BGTs checked> he used to do that at home    Chronic pain syndrome  He stated that since I started the new pain med (Dilaudid) he is feeling much better    Anxiety disorder, unspecified type  He denied feeling anxious at this time    Chronic systolic congestive heart failure (H)  He denies SOB and denies having any edema    CKD (chronic kidney disease) stage 3, GFR 30-59 ml/min  GFR 46    COPD, moderate (H)  He stated that he occ uses the 02, wishes to keep 02 tank in his room    History of colon cancer, no staging  He has no c/o of abdominal pain or diff with his BMs      ALLERGIES: Ace inhibitors; Cleocin; Dulera; Erythromycin; Hydralazine; Imdur [isosorbide]; Methadone; Penicillins; and Spiriva handihaler  Past Medical, Surgical, Family and Social History reviewed and updated in EPIC.    Current Outpatient Prescriptions   Medication Sig Dispense Refill     HYDROmorphone HCl (DILAUDID PO) Take 2 mg by mouth every 3 hours       acetaminophen (TYLENOL) 325 MG tablet Take 325-650 mg by mouth every 4 hours as needed for mild pain       Potassium Chloride ER 20 MEQ TBCR Take 1 tablet (20 mEq) by mouth daily 90 tablet 1     furosemide (LASIX) 20 MG tablet Take 0.5 tablets (10 mg) by mouth daily 30 tablet prn     PREDNISONE PO Take 5 mg by mouth daily       HydrOXYzine Pamoate (VISTARIL PO) Take 25 mg by mouth every 12 hours And 1 prn dose in 24 hrs       sennosides (SENOKOT) 8.6 MG tablet Take 2 tablets by mouth every 12 hours        "Warfarin Sodium (COUMADIN PO) Take 4.75 mg by mouth daily        METFORMIN HCL PO Take 250 mg by mouth 2 times daily (with meals)       Gabapentin (NEURONTIN PO) Take 300 mg by mouth every 12 hours Reported on 3/27/2017       DULOXETINE HCL PO Take 60 mg by mouth At Bedtime       guaiFENesin (ROBITUSSIN) 100 MG/5ML SYRP Take 10 mLs by mouth every 4 hours as needed for cough       ferrous sulfate (IRON) 325 (65 FE) MG tablet Take 325 mg by mouth 2 times daily Give with orange juice       triamcinolone (KENALOG) 0.1 % ointment Apply topically 2 times daily Apply to Both lower extremities topically every day and evening shift       ammonium lactate (LAC-HYDRIN) 12 % lotion Apply topically 2 times daily       omeprazole (PRILOSEC) 40 MG capsule Take 1 capsule (40 mg) by mouth daily Take 30-60 minutes before a meal. 90 capsule 3     carvedilol (COREG) 12.5 MG tablet Take 6.25 mg by mouth 2 times daily (with meals)       albuterol (PROAIR HFA, PROVENTIL HFA, VENTOLIN HFA) 108 (90 BASE) MCG/ACT inhaler Inhale 2 puffs into the lungs 2 times daily as needed for shortness of breath / dyspnea or wheezing       tamsulosin (FLOMAX) 0.4 MG 24 hr capsule TAKE ONE CAPSULE BY MOUTH EVERY DAY 90 capsule 3     Medications reviewed:  Medications reconciled to facility chart and changes were made to reflect current medications as identified as above med list. Below are the changes that were made:   Medications stopped since last EPIC medication reconciliation:   There are no discontinued medications.    Medications started since last [facility] medication reconciliation:  No orders of the defined types were placed in this encounter.      REVIEW OF SYSTEMS:  4 point ROS including Respiratory, CV, GI and , other than that noted in the HPI,  is negative    Physical Exam:  /59  Pulse 61  Temp 97.9  F (36.6  C)  Resp 18  Ht 5' 9\" (1.753 m)  Wt 158 lb (71.7 kg)  SpO2 96%  BMI 23.33 kg/m2  GENERAL APPEARANCE:  Alert, in no distress, " thin, cooperative  ENT:  Mouth and posterior oropharynx normal, moist mucous membranes, [place], does well in 1:1  EYES:  EOM, conjunctivae, lids, pupils and irises normal  RESP:  respiratory effort and palpation of chest normal, lungs clear to auscultation , no respiratory distress  CV:  Palpation and auscultation of heart done , regular rate and rhythm, no murmur, rub, or gallop, no edema  ABDOMEN:  normal bowel sounds, soft, nontender, no hepatosplenomegaly or other masses  M/S:   Gait and station abnormal > Depends on staff for transfers, is transported by staff with a w/c> he is dependent for most ADLs  No pain or swelling in his wrists or elbows  SKIN:  Soft and intact  NEURO:   Cranial nerves 2-12 are normal tested and grossly at patient's baseline  PSYCH:  oriented to himself, insight and judgement impaired, memory impaired , affect and mood normal    Recent Labs:   CBC RESULTS:   Recent Labs   Lab Test  04/19/17   0635  02/13/17   0600   WBC  6.8  7.1   RBC  3.24*  3.29*   HGB  9.4*  9.5*   HCT  30.0*  30.0*   MCV  93  91   MCH  29.0  28.9   MCHC  31.3*  31.7   RDW  14.4  14.4   PLT  161  154       Last Basic Metabolic Panel:  Recent Labs   Lab Test  04/19/17   0635  02/13/17   0600   NA  140  141   POTASSIUM  4.4  4.0   CHLORIDE  104  108   REGAN  8.7  8.6   CO2  30  25   BUN  39*  33*   CR  1.45*  1.34*   GLC  87  88       Liver Function Studies -   Recent Labs   Lab Test  07/25/16   0700  07/18/16   1450   PROTTOTAL  7.0  7.0   ALBUMIN  2.4*  2.3*   BILITOTAL  0.5  0.5   ALKPHOS  164*  171*   AST  18  18   ALT  12  15     TSH   Date Value Ref Range Status   05/06/2015 0.60 0.40 - 4.00 mU/L Final   08/07/2014 0.80 0.40 - 4.00 mU/L Final     Comment:     Effective 7/30/2014, the reference range for this assay has changed to reflect   new instrumentation/methodology.       Lab Results   Component Value Date    A1C 7.0 04/19/2017    A1C 7.8 10/12/2016       Assessment/Plan:  Type 2 diabetes mellitus with diabetic  neuropathy, without long-term current use of insulin (H)  His A1c is in an acceptable range, no change in tx> he will have A1c q 6 mos    Chronic pain syndrome  Will cont with current meds> no change    Anxiety disorder, unspecified type  Decrease of Vistaril may cause emotional harm  No change in tx    Chronic systolic congestive heart failure (H)  No change in tx    CKD (chronic kidney disease) stage 3, GFR 30-59 ml/min  Will renally adjust meds as indicated    COPD, moderate (H)  Will allow him to use 02 prn    History of colon cancer, no staging  No more investigating or tx      Orders:  none    Time> 30 min    Electronically signed by  MARINA Alvarado CNP

## 2017-05-26 PROBLEM — Z87.39 HISTORY OF ACUTE GOUTY ARTHRITIS: Status: ACTIVE | Noted: 2017-01-01

## 2017-06-02 NOTE — PROGRESS NOTES
[facility]    Chief Complaint   Patient presents with     [facility] Regulatory       HPI:    Suad Sloan is a 86 year old  (12/23/1930), who is being seen today for a federally mandated E/M visit at [facility].  HPI information obtained from: facility chart records. Today's concerns are:  1. Type 2 diabetes mellitus with diabetic neuropathy, without long-term current use of insulin (H)    2. Chronic pain syndrome    3. Chronic systolic congestive heart failure (H)    4. Hypoxia    5. Paroxysmal atrial fibrillation (H)    6. Anxiety    7. Cardiac pacemaker in situ        ALLERGIES: Ace inhibitors; Cleocin; Dulera; Erythromycin; Hydralazine; Imdur [isosorbide]; Methadone; Penicillins; and Spiriva handihaler  PAST MEDICAL HISTORY:  has a past medical history of AAA (abdominal aortic aneurysm) (H) (9/05); Alcohol dependence (H); Anxiety; Arthritis of hands; Atrial fibrillation (H); Bladder cancer (H) (5/06); Cardiomyopathy (H); Chronic pain; Chronic systolic congestive heart failure (H); CKD (chronic kidney disease) stage 3, GFR 30-59 ml/min; Closed fracture of neck of left femur (H) (9/26/2016); Closed fracture of neck of left femur (H) (9/26/2016); Colon cancer (H) (9/05); COPD, moderate (H); Coronary atherosclerosis (1995); Depression; Diverticulosis of colon (without mention of hemorrhage); Hyperlipidemia; Hypertension; Iliac artery aneurysm, left (H); Iron deficiency anemia (7/05); Left leg cellulitis (7/4/2016); Malignant neoplasm of prostate (H) (1994); Peripheral neuropathy (H); Protein deficiency (H), Albumin2.4  7- (7/25/2016); Pulmonary hypertension (H); Respiratory failure (H) (12/5/2014); Restless leg syndrome; Sleep apnea (6/10); Sleep apnea; 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  PAST SURGICAL HISTORY:  has a past surgical history that includes Cholecystectomy (1997); Appendectomy open (1970's);  Colonoscopy (9/05, 4/07, 2/11); Colon surgery (9/05); AAA repair / Umbilical hernia repair (6/06); cataract iol, rt/lt (Right, 1/09); cataract iol, rt/lt (Left, 2/09); lysis of adhesions, repair of incisional hernias (2/10); Implant pacemaker (09-16-11); FABRIC WRAPPING OF ABDOMINAL ANEURYSM; Endovascular repair aneurysm abdominal aorta (N/A, 12/16/2015); and Esophagoscopy, gastroscopy, duodenoscopy (EGD), combined (N/A, 3/28/2016).  FAMILY HISTORY: family history includes CANCER in his brother and sister; DIABETES in his paternal grandmother; HEART DISEASE (age of onset: 70) in his father; HEART DISEASE (age of onset: 80) in his mother.  SOCIAL HISTORY:  reports that he quit smoking about 7 years ago. His smoking use included Cigarettes. He has a 50.00 pack-year smoking history. He has never used smokeless tobacco. He reports that he does not drink alcohol or use illicit drugs.    MEDICATIONS:  Current Outpatient Prescriptions   Medication Sig Dispense Refill     HYDROmorphone HCl (DILAUDID PO) Take 2 mg by mouth every 3 hours       acetaminophen (TYLENOL) 325 MG tablet Take 325-650 mg by mouth every 4 hours as needed for mild pain       Potassium Chloride ER 20 MEQ TBCR Take 1 tablet (20 mEq) by mouth daily 90 tablet 1     furosemide (LASIX) 20 MG tablet Take 0.5 tablets (10 mg) by mouth daily 30 tablet prn     PREDNISONE PO Take 5 mg by mouth daily       HydrOXYzine Pamoate (VISTARIL PO) Take 25 mg by mouth every 12 hours And 1 prn dose in 24 hrs       sennosides (SENOKOT) 8.6 MG tablet Take 2 tablets by mouth every 12 hours       Warfarin Sodium (COUMADIN PO) Take 4.75 mg by mouth daily        METFORMIN HCL PO Take 250 mg by mouth 2 times daily (with meals)       Gabapentin (NEURONTIN PO) Take 300 mg by mouth every 12 hours Reported on 3/27/2017       DULOXETINE HCL PO Take 60 mg by mouth At Bedtime       guaiFENesin (ROBITUSSIN) 100 MG/5ML SYRP Take 10 mLs by mouth every 4 hours as needed for cough        "ferrous sulfate (IRON) 325 (65 FE) MG tablet Take 325 mg by mouth 2 times daily Give with orange juice       triamcinolone (KENALOG) 0.1 % ointment Apply topically 2 times daily Apply to Both lower extremities topically every day and evening shift       ammonium lactate (LAC-HYDRIN) 12 % lotion Apply topically 2 times daily       omeprazole (PRILOSEC) 40 MG capsule Take 1 capsule (40 mg) by mouth daily Take 30-60 minutes before a meal. 90 capsule 3     carvedilol (COREG) 12.5 MG tablet Take 6.25 mg by mouth 2 times daily (with meals)       albuterol (PROAIR HFA, PROVENTIL HFA, VENTOLIN HFA) 108 (90 BASE) MCG/ACT inhaler Inhale 2 puffs into the lungs 2 times daily as needed for shortness of breath / dyspnea or wheezing       tamsulosin (FLOMAX) 0.4 MG 24 hr capsule TAKE ONE CAPSULE BY MOUTH EVERY DAY 90 capsule 3     Medications reviewed:  Medications reconciled to facility chart and changes were made to reflect current medications as identified as above med list. Below are the changes that were made:   Medications stopped since last EPIC medication reconciliation:   There are no discontinued medications.    Medications started since last [facility] medication reconciliation:  No orders of the defined types were placed in this encounter.        ROS:  4 point ROS including Respiratory, CV, GI and , other than that noted in the HPI,  is negative  He indicates he is feeling well; feels well cared for in the  Facility; Care team meeting this week; reports pain well controlled; currently pain controlled 2 out of 10 when at rest and 4 out of 10 when moved but improves quickly; getting pain medications every 4 hours scheduled.    Exam:  Vitals: /78  Pulse 72  Temp 97.6  F (36.4  C)  Resp 16  Ht 5' 9\" (1.753 m)  Wt 158 lb (71.7 kg)  SpO2 96%  BMI 23.33 kg/m2  BMI= Body mass index is 23.33 kg/(m^2).  GENERAL APPEARANCE:  Resting comfortably in bed  RESP:  lungs clear to auscultation , no respiratory distress  CV:  " Palpation and auscultation of heart done - regular on exam; Pacemaker in left upper chest noted  ABDOMEN:  normal bowel sounds, soft, nontender,   M/S:   Unable to ambulate due to left hip concern; past fracture, not amenable to surgical intervention; Ronal lift for transfers in/out of wheelchair  NEURO:   Alert, able to move all extremities but activity limited by hip  PSYCH:  oriented X 3, insightful    Lab/Diagnostic data:   CBC RESULTS:   Recent Labs   Lab Test  04/19/17   0635  02/13/17   0600   WBC  6.8  7.1   RBC  3.24*  3.29*   HGB  9.4*  9.5*   HCT  30.0*  30.0*   MCV  93  91   MCH  29.0  28.9   MCHC  31.3*  31.7   RDW  14.4  14.4   PLT  161  154       Last Basic Metabolic Panel:  Recent Labs   Lab Test  04/19/17   0635  02/13/17   0600   NA  140  141   POTASSIUM  4.4  4.0   CHLORIDE  104  108   REGAN  8.7  8.6   CO2  30  25   BUN  39*  33*   CR  1.45*  1.34*   GLC  87  88       Liver Function Studies -   Recent Labs   Lab Test  07/25/16   0700  07/18/16   1450   PROTTOTAL  7.0  7.0   ALBUMIN  2.4*  2.3*   BILITOTAL  0.5  0.5   ALKPHOS  164*  171*   AST  18  18   ALT  12  15       TSH   Date Value Ref Range Status   05/06/2015 0.60 0.40 - 4.00 mU/L Final   08/07/2014 0.80 0.40 - 4.00 mU/L Final     Comment:     Effective 7/30/2014, the reference range for this assay has changed to reflect   new instrumentation/methodology.       Lab Results   Component Value Date    A1C 7.0 04/19/2017    A1C 7.8 10/12/2016       ASSESSMENT/PLAN  (E11.40) Type 2 diabetes mellitus with diabetic neuropathy, without long-term current use of insulin (H)  (primary encounter diagnosis)  Comment: Metformin 250 mg PO BID with good control; A1C  7.0 in April 2017  Plan: periodic monitoring of A1C and continue Metformin    (G89.4) Chronic pain syndrome  Comment: pt reports pain well controlled; currently pain controlled 2 out of 10 when at rest and 4 out of 10 when moved but improves quickly; getting pain medications every 4 hours  scheduled.  Plan: continue pain medications every 4 hours scheduled.    (I50.22) Chronic systolic congestive heart failure (H)  Comment: doing well on current medications;  No evidence of decompensated heart failure.  Plan: no changes in medications.    (R09.02) Hypoxia  Comment: chart reviewd, hx of COPD, significant tobacco use, stopped at time of discharge; chart suggests probable asbestosis; no formal dx found. O2 PRN  Plan: Oxygen PRN    (I48.0) Paroxysmal atrial fibrillation (H)  Comment: PAF; on Warfarin and has Pacemaker in place.  Plan: Warfarin continues; is is not ambulatory therefore low risk for falls.    (F41.9) Anxiety  Comment: several meds tried but doing well with low dose, scheduled Vistaril.  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  Plan:     The current medical regimen is effective;  continue present plan and medications.      Bridget Valenzuela MD  Internal Medicine  electronically signed

## 2017-06-02 NOTE — MR AVS SNAPSHOT
After Visit Summary   6/2/2017    Suad Sloan    MRN: [id number]           Patient Information     Date Of Birth          12/23/1930        Visit Information        Provider Department      6/2/2017 8:52 AM Bridget Valenzuela MD [facility]        Today's Diagnoses     ERRONEOUS ENCOUNTER--DISREGARD    -  1       Follow-ups after your visit        Your next 10 appointments already scheduled     Aug 01, 2017  1:45 PM CDT   Remote PPM Check with ALONZO TECH1   [facility] [place] ([facility])    [address] [place], [place] [id number]  [facility] [address] [phone]           This appointment is for a remote check of your pacemaker.  This is not an appointment at the office.              Who to contact     If you have questions or need follow up information about today's clinic visit or your schedule please contact [facility] directly at [phone].  Normal or non-critical lab and imaging results will be communicated to you by MyChart, letter or phone within 4 business days after the clinic has received the results. If you do not hear from us within 7 days, please contact the clinic through [employer] or phone. If you have a critical or abnormal lab result, we will notify you by phone as soon as possible.  Submit refill requests through [employer] or call your pharmacy and they will forward the refill request to us. Please allow 3 business days for your refill to be completed.          Additional Information About Your Visit        [employer] Information     [employer] gives you secure access to your electronic health record. If you see a primary care provider, you can also send messages to your care team and make appointments. If you have questions, please call your primary care clinic.  If you do not have a primary care provider, please call [phone] and they will assist you.        Care EveryWhere ID     This is your Care EveryWhere ID.  This could be used by other organizations to access your [facility] medical records  [id number]         Blood Pressure from Last 3 Encounters:   06/02/17 121/78   05/23/17 130/59   04/12/17 124/59    Weight from Last 3 Encounters:   06/02/17 158 lb (71.7 kg)   05/23/17 158 lb (71.7 kg)   04/12/17 148 lb 6.4 oz (67.3 kg)              Today, you had the following     No orders found for display       Primary Care Provider Office Phone # Fax #    Caro Warner, APRN [phone]       [facility] [address]    [place] [address]        Thank you!     Thank you for choosing [facility]  for your care. Our goal is always to provide you with excellent care. Hearing back from our patients is one way we can continue to improve our services. Please take a few minutes to complete the written survey that you may receive in the mail after your visit with us. Thank you!             Your Updated Medication List - Protect others around you: Learn how to safely use, store and throw away your medicines at www.disposemymeds.org.          This list is accurate as of: 6/2/17 11:59 PM.  Always use your most recent med list.                   Brand Name Dispense Instructions for use    acetaminophen 325 MG tablet    TYLENOL     Take 325-650 mg by mouth every 4 hours as needed for mild pain       albuterol 108 (90 BASE) MCG/ACT Inhaler    PROAIR HFA/PROVENTIL HFA/VENTOLIN HFA     Inhale 2 puffs into the lungs 2 times daily as needed for shortness of breath / dyspnea or wheezing       ammonium lactate 12 % lotion    LAC-HYDRIN     Apply topically 2 times daily       carvedilol 12.5 MG tablet    COREG     Take 6.25 mg by mouth 2 times daily (with meals)       COUMADIN PO      Take 4.75 mg by mouth daily       DILAUDID PO      Take 2 mg by mouth every 3 hours       DULOXETINE HCL PO      Take 60 mg by mouth At Bedtime       ferrous sulfate 325 (65 FE) MG tablet    IRON      Take 325 mg by mouth 2 times daily Give with orange juice       furosemide 20 MG tablet    LASIX    30 tablet    Take 0.5 tablets (10 mg) by mouth daily       guaiFENesin 100 MG/5ML Syrp    ROBITUSSIN     Take 10 mLs by mouth every 4 hours as needed for cough       METFORMIN HCL PO      Take 250 mg by mouth 2 times daily (with meals)       NEURONTIN PO      Take 300 mg by mouth every 12 hours Reported on 3/27/2017       omeprazole 40 MG capsule    priLOSEC    90 capsule    Take 1 capsule (40 mg) by mouth daily Take 30-60 minutes before a meal.       Potassium Chloride ER 20 MEQ Tbcr     90 tablet    Take 1 tablet (20 mEq) by mouth daily       PREDNISONE PO      Take 5 mg by mouth daily       sennosides 8.6 MG tablet    SENOKOT     Take 2 tablets by mouth every 12 hours       tamsulosin 0.4 MG capsule    FLOMAX    90 capsule    TAKE ONE CAPSULE BY MOUTH EVERY DAY       triamcinolone 0.1 % ointment    KENALOG     Apply topically 2 times daily Apply to Both lower extremities topically every day and evening shift       VISTARIL PO      Take 25 mg by mouth every 12 hours And 1 prn dose in 24 hrs

## 2017-06-07 NOTE — PROGRESS NOTES
[facility]    HPI:    Suad Sloan is a 86 year old  (12/23/1930), who is being seen today for an episodic care visit at [facility]. Today's concern is INR/Coumadin management for A. Fib    Bleeding Signs/Symptoms:  None  Thromboembolic Signs/Symptoms:  None    Medication Changes:  No  Dietary Changes:  No  Activity Changes: No  Bacterial/Viral Infection:  No    Missed Coumadin Doses:  None    Other Concerns:  No      OBJECTIVE:    INR Today:  2.7  Current Dose:  4.75 mg po daily    ASSESSMENT:    Therapeutic INR for goal of 2-3    PLAN:    New Dose: No Change      Next INR: 1 month    Caro Warner NP

## 2017-07-05 NOTE — PROGRESS NOTES
[facility]    HPI:    Suad Sloan is a 86 year old  (12/23/1930), who is being seen today for an episodic care visit at [facility]. Today's concern is INR/Coumadin management for A. Fib    Bleeding Signs/Symptoms:  None  Thromboembolic Signs/Symptoms:  None    Medication Changes:  No  Dietary Changes:  No  Activity Changes: No  Bacterial/Viral Infection:  No    Missed Coumadin Doses:  None    Other Concerns:  No      OBJECTIVE:    INR Today:  2.7  Current Dose:  4.75 mg po daily    ASSESSMENT:    Therapeutic INR for goal of 2-3    PLAN:    New Dose: No Change      Next INR: 1 month    Caro Warner NP

## 2017-07-28 PROBLEM — F10.21 ALCOHOL DEPENDENCE IN REMISSION (H): Status: ACTIVE | Noted: 2017-01-01

## 2017-07-28 PROBLEM — K43.9 VENTRAL HERNIA WITHOUT OBSTRUCTION OR GANGRENE: Status: ACTIVE | Noted: 2017-01-01

## 2017-07-28 NOTE — PROGRESS NOTES
[facility]  Chief Complaint   Patient presents with     Annual Comprehensive Nursing Home       HPI:    Suad Sloan is a 86 year old  (12/23/1930), who is being seen today for an annual comprehensive visit at [facility].  HPI information obtained from: facility chart records.    Today's concerns are:  COPD, moderate (H)  He denied SOB today    Other chronic pain  He stated that he is feeling well, no pain with current meds    Type 2 diabetes mellitus with stage 3 chronic kidney disease, without long-term current use of insulin (H)  Lab Results   Component Value Date    A1C 7.0 04/19/2017    A1C 7.8 10/12/2016    A1C 7.5 07/05/2016    A1C 6.6 03/25/2016    A1C 6.7 11/16/2015     Chronic systolic congestive heart failure (H)  He has no SOB, weight ~ 155 to 160 lbs    Anxiety disorder, unspecified type  He was feeling well and denied emotional distress during the exam    Multifocal motor neuropathy (H)  He did state that occ his discomfort in his feet    Alcohol dependence in remission (H)  In the last 12 months he has been @ [facility], has not had alcohol.     Advance care planning  POLST reviewed with Suad and his wife    Ventral hernia without obstruction or gangrene  He is aware of the hernia, but denies discomfort    BP ~ 130/70, PHQ9 0  5-17-17 he is taking Duloxetine 60 mg daily    Cerumen impaction> bilateral    ALLERGIES: Ace inhibitors; Cleocin; Dulera; Erythromycin; Hydralazine; Imdur [isosorbide]; Methadone; Penicillins; and Spiriva handihaler  PROBLEM LIST:  Patient Active Problem List   Diagnosis     Hypertension goal BP (blood pressure) < 140/90     Localized osteoarthritis of hand     Malignant neoplasm of prostate (H)     Other iron deficiency anemias     Overlapping malignant neoplasm of colon (H)     Diverticulosis of large intestine     Neoplasm of bladder     Advance care planning     Health Care Home     CKD (chronic kidney disease) stage 3, GFR 30-59  ml/min     COPD, moderate (H)     Chronic foot pain     Chronic hand pain     CAD (coronary artery disease)     Cardiac pacemaker in situ     Peripheral neuropathy (H)     Hypoxia     Anxiety     Major depressive disorder, single episode, moderate (H)     Abdominal aortic aneurysm (H)     Cardiomyopathy     Chronic pain     Type 2 diabetes mellitus with diabetic chronic kidney disease (H)     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)     Type 2 diabetes mellitus with other circulatory complications (H)     Pseudoaneurysm of aorta (H)     Chronic systolic congestive heart failure (H)     Long-term (current) use of anticoagulants [Z79.01]     Dependence on nicotine from cigarettes,has been smoking again to relax> now nicotine patch     Type 2 diabetes mellitus with diabetic neuropathy (H)     Neuropathy (H), 2nd to T2DM     Routine general medical examination at a health care facility, done 7-     Generalized muscle weakness     History of colon cancer, no staging     Osteoarthritis     Paroxysmal atrial fibrillation (H)     Encounter for monitoring coumadin therapy     Closed fracture of neck of left femur (H)     Medication intolerance>> Methadone> very confused     Anxiety disorder, unspecified type     History of acute gouty arthritis     Alcohol dependence in remission (H)     Ventral hernia without obstruction or gangrene     PAST MEDICAL HISTORY:  has a past medical history of AAA (abdominal aortic aneurysm) (H) (9/05); Alcohol dependence (H); Anxiety; Arthritis of hands; Atrial fibrillation (H); Bladder cancer (H) (5/06); Cardiomyopathy (H); Chronic pain; Chronic systolic congestive heart failure (H); CKD (chronic kidney disease) stage 3, GFR 30-59 ml/min; Closed fracture of neck of left femur (H) (9/26/2016); Closed fracture of neck of left femur (H) (9/26/2016); Colon cancer (H) (9/05); COPD, moderate (H); Coronary atherosclerosis (1995); Depression; Diverticulosis of colon (without  mention of hemorrhage); GI bleeding (3/24/2016); Hyperlipidemia; Hypertension; Iliac artery aneurysm, left (H); Iron deficiency anemia (7/05); Left leg cellulitis (7/4/2016); Malignant neoplasm of prostate (H) (1994); Peripheral neuropathy (H); Protein deficiency (H), Albumin2.4  7- (7/25/2016); Pulmonary hypertension (H); Respiratory failure (H) (12/5/2014); Restless leg syndrome; Sleep apnea (6/10); Sleep apnea; 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  PAST SURGICAL HISTORY:  has a past surgical history that includes Cholecystectomy (1997); Appendectomy open (1970's); Colonoscopy (9/05, 4/07, 2/11); Colon surgery (9/05); AAA repair / Umbilical hernia repair (6/06); cataract iol, rt/lt (Right, 1/09); cataract iol, rt/lt (Left, 2/09); lysis of adhesions, repair of incisional hernias (2/10); Implant pacemaker (09-16-11); FABRIC WRAPPING OF ABDOMINAL ANEURYSM; Endovascular repair aneurysm abdominal aorta (N/A, 12/16/2015); and Esophagoscopy, gastroscopy, duodenoscopy (EGD), combined (N/A, 3/28/2016).  FAMILY HISTORY: family history includes CANCER in his brother and sister; DIABETES in his paternal grandmother; HEART DISEASE (age of onset: 70) in his father; HEART DISEASE (age of onset: 80) in his mother.  SOCIAL HISTORY:  reports that he quit smoking about 7 years ago. His smoking use included Cigarettes. He has a 50.00 pack-year smoking history. He has never used smokeless tobacco. He reports that he does not drink alcohol or use illicit drugs.  IMMUNIZATIONS:  Most Recent Immunizations   Administered Date(s) Administered     Influenza (High Dose) 3 valent vaccine 10/18/2016     Influenza (IIV3) 09/17/2011     Pneumococcal (PCV 13) 02/04/2015     Pneumococcal 23 valent 04/13/2005     TD (ADULT, 7+) 01/01/2001     TDAP Vaccine (Boostrix) 06/13/2011     Above immunizations pulled from [facility]. [facility] and facility records also reconciled. Outstanding information  sent to  to update [facility] >yes.  Future immunizations are not needed at this point as all recommended immunizations are up to date.   MEDICATIONS:  Current Outpatient Prescriptions   Medication Sig Dispense Refill     HYDROmorphone HCl (DILAUDID PO) Take 2 mg by mouth every 3 hours       acetaminophen (TYLENOL) 325 MG tablet Take 325-650 mg by mouth every 4 hours as needed for mild pain       Potassium Chloride ER 20 MEQ TBCR Take 1 tablet (20 mEq) by mouth daily 90 tablet 1     furosemide (LASIX) 20 MG tablet Take 0.5 tablets (10 mg) by mouth daily 30 tablet prn     PREDNISONE PO Take 5 mg by mouth daily       HydrOXYzine Pamoate (VISTARIL PO) Take 25 mg by mouth every 12 hours And 1 prn dose in 24 hrs       sennosides (SENOKOT) 8.6 MG tablet Take 2 tablets by mouth every 12 hours       Warfarin Sodium (COUMADIN PO) Take 4.75 mg by mouth daily        METFORMIN HCL PO Take 250 mg by mouth 2 times daily (with meals)       Gabapentin (NEURONTIN PO) Take 300 mg by mouth every 12 hours Reported on 3/27/2017       DULOXETINE HCL PO Take 60 mg by mouth At Bedtime       guaiFENesin (ROBITUSSIN) 100 MG/5ML SYRP Take 10 mLs by mouth every 4 hours as needed for cough       ferrous sulfate (IRON) 325 (65 FE) MG tablet Take 325 mg by mouth 2 times daily Give with orange juice       triamcinolone (KENALOG) 0.1 % ointment Apply topically 2 times daily Apply to Both lower extremities topically every day and evening shift       ammonium lactate (LAC-HYDRIN) 12 % lotion Apply topically 2 times daily       omeprazole (PRILOSEC) 40 MG capsule Take 1 capsule (40 mg) by mouth daily Take 30-60 minutes before a meal. 90 capsule 3     carvedilol (COREG) 12.5 MG tablet Take 6.25 mg by mouth 2 times daily (with meals)       albuterol (PROAIR HFA, PROVENTIL HFA, VENTOLIN HFA) 108 (90 BASE) MCG/ACT inhaler Inhale 2 puffs into the lungs 2 times daily as needed for shortness of breath / dyspnea or wheezing       tamsulosin  "(FLOMAX) 0.4 MG 24 hr capsule TAKE ONE CAPSULE BY MOUTH EVERY DAY 90 capsule 3     Medications reviewed:  Medications reconciled to facility chart and changes were made to reflect current medications as identified as above med list. Below are the changes that were made:   Medications stopped since last EPIC medication reconciliation:   There are no discontinued medications.    Medications started since last [facility] medication reconciliation:  No orders of the defined types were placed in this encounter.      Case Management:  I have reviewed the facility/SNF care plan/MDS which was done 5-, including the falls risk, nutrition and pain screening. I also reviewed the current immunizations, and preventive care..will discuss with resident and family as indicated Patient's desire to return to the community is not present. Current Level of Care is appropriate.    Advance Directive Discussion:    I reviewed the current advanced directives as reflected in EPIC, the POLST and the facility chart, and verified the congruency of orders >yes. I talked with his wife in person and Suad was part of the POLST discussion.  I did review the advance directives with the resident.     Team Discussion:  I communicated with the appropriate disciplines involved with the Plan of Care:   With nursing staff.    Patient Goal:  Patient's goal is pain control and comfort.    Information reviewed:  Medications, vital signs, orders, and nursing notes.    ROS:  10 point ROS of systems including Constitutional, Eyes, Respiratory, Cardiovascular, Gastroenterology, Genitourinary, Integumentary, Muscularskeletal, Psychiatric were all negative except for pertinent positives noted in my HPI.    Exam:  /60  Pulse 68  Temp 97.8  F (36.6  C)  Resp 16  Ht 5' 9\" (1.753 m)  Wt 157 lb (71.2 kg)  SpO2 95%  BMI 23.18 kg/m2    GENERAL APPEARANCE:  Alert, in no distress, thin, cooperative  ENT:  Mouth and posterior oropharynx normal, moist mucous " membranes, normal hearing acuity, ear canal:TM not visualized secondary to cerumen, cerumen removed with manual debridement, normal TM noted after wax removal  EYES:  EOM, conjunctivae, lids, pupils and irises normal, lens implant bilateral  NECK:  No adenopathy,masses or thyromegaly  RESP:  respiratory effort and palpation of chest normal, lungs clear to auscultation , no respiratory distress  CV:  Palpation and auscultation of heart done , regular rate and rhythm, no murmur, rub, or gallop, no edema  ABDOMEN:  normal bowel sounds, soft, nontender, no hepatosplenomegaly or other masses> he a reducible ventral hernia, mid line in a scar line  M/S:   Gait and station abnormal >Depends on staff for transfers, is transported by staff with a w/c> he is non weight bearing 2nd to a non healed L hip fracture  SKIN:  Soft and intact> has multiple hematomas on UE> is on coumadin  NEURO:   Cranial nerves 2-12 are normal tested and grossly at patient's baseline  PSYCH:  oriented X 3, normal insight, judgement and memory, affect and mood normal     BP 06/04-07/23: 104-135/54-73 mmHg    Lab/Diagnostic data:   CBC RESULTS:   Recent Labs   Lab Test  04/19/17   0635  02/13/17   0600   WBC  6.8  7.1   RBC  3.24*  3.29*   HGB  9.4*  9.5*   HCT  30.0*  30.0*   MCV  93  91   MCH  29.0  28.9   MCHC  31.3*  31.7   RDW  14.4  14.4   PLT  161  154       Last Basic Metabolic Panel:  Recent Labs   Lab Test  04/19/17 0635  02/13/17   0600   NA  140  141   POTASSIUM  4.4  4.0   CHLORIDE  104  108   REGAN  8.7  8.6   CO2  30  25   BUN  39*  33*   CR  1.45*  1.34*   GLC  87  88       Lab Results   Component Value Date    A1C 7.0 04/19/2017    A1C 7.8 10/12/2016       ASSESSMENT/PLAN  COPD, moderate (H)  Will cont with current tx    Other chronic pain  Per his wishes he is medicated q 3 hrs around the clock, including night time    Type 2 diabetes mellitus with stage 3 chronic kidney disease, without long-term current use of insulin (H)  Will  cont with current Metformin  I have encouraged him to avoid concentrated sweets    Chronic systolic congestive heart failure (H)  Will cont with current tx    Anxiety disorder, unspecified type  He does well with scheduled doses of Vistaril q 12 hrs    Multifocal motor neuropathy (H)  Is doing well with current tx    Alcohol dependence in remission (H)  Because he has been in LTC last 12 mos he has not abused ETOH> no tx    Advance care planning  POLST completed. Click on the code status to view the scanned copy.   Caro Warner  7/28/2017    Ventral hernia without obstruction or gangrene  No new tx    Bilateral impacted cerumen  TMs obstructed with cerumen before removal  - REMOVE IMPACTED CERUMEN    HTN> well controlled with CHF meds> no change  Depression> mood is stable  Decrease of Cymbalta may cause emotional harm  No change in tx    Orders:  none  Electronically signed by:  MARINA Alvarado CNP

## 2017-08-03 NOTE — PROGRESS NOTES
[facility]    HPI:    Suad Sloan is a 86 year old  (12/23/1930), who is being seen today for an episodic care visit at [facility]. Today's concern is INR/Coumadin management for A. Fib    Bleeding Signs/Symptoms:  None  Thromboembolic Signs/Symptoms:  None    Medication Changes:  No  Dietary Changes:  No  Activity Changes: No  Bacterial/Viral Infection:  No    Missed Coumadin Doses:  None    Other Concerns:  No      OBJECTIVE:    INR Today:  2.7  Current Dose:  4.75 mg po daily    ASSESSMENT:    Therapeutic INR for goal of 2-3    PLAN:    New Dose: No Change      Next INR: 1 month    Caro Warner NP

## 2017-08-09 NOTE — PROGRESS NOTES
Patient missed [facility] transmission on 8/1/17. Sent letter 8/2, no response. Sent 1 week letter today

## 2017-08-14 NOTE — PROGRESS NOTES
[facility]    Chief Complaint   Patient presents with     prison [employer]       HPI:    Suad Sloan is a 86 year old  (12/23/1930), who is being seen today for a federally mandated E/M visit at [facility].  HPI information obtained from: facility chart records.   Today's concerns are:    Type 2 diabetes mellitus with diabetic polyneuropathy, without long-term current use of insulin (H)  Lab Results   Component Value Date    A1C 7.0 04/19/2017    A1C 7.8 10/12/2016    A1C 7.5 07/05/2016    A1C 6.6 03/25/2016    A1C 6.7 11/16/2015   He is on metformin and a liberal diet      Neuropathy (H), 2nd to T2DM  He stated that his feet bother him occasionally    Major depressive disorder, single episode, moderate (H)  He stated that he is ok and content    Other chronic pain  He stated that he has no pain unless when he moves, then he has some L hip pain    COPD, moderate (H)  He stated that sometimes he feel SOB, but when they check his 02 sat, it is always above 90 %      ALLERGIES: Ace inhibitors; Cleocin; Dulera; Erythromycin; Hydralazine; Imdur [isosorbide]; Methadone; Penicillins; and Spiriva handihaler  PAST MEDICAL HISTORY:  has a past medical history of AAA (abdominal aortic aneurysm) (H) (9/05); Alcohol dependence (H); Anxiety; Arthritis of hands; Atrial fibrillation (H); Bladder cancer (H) (5/06); Cardiomyopathy (H); Chronic pain; Chronic systolic congestive heart failure (H); CKD (chronic kidney disease) stage 3, GFR 30-59 ml/min; Closed fracture of neck of left femur (H) (9/26/2016); Closed fracture of neck of left femur (H) (9/26/2016); Colon cancer (H) (9/05); COPD, moderate (H); Coronary atherosclerosis (1995); Depression; Diverticulosis of colon (without mention of hemorrhage); GI bleeding (3/24/2016); Hyperlipidemia; Hypertension; Hypoxia (8/26/2014); Iliac artery aneurysm, left (H); Iron deficiency anemia (7/05); Left leg cellulitis (7/4/2016);  Malignant neoplasm of prostate (H) (1994); Peripheral neuropathy (H); Protein deficiency (H), Albumin2.4  7- (7/25/2016); Pulmonary hypertension (H); Respiratory failure (H) (12/5/2014); Restless leg syndrome; Sleep apnea (6/10); Sleep apnea; 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  PAST SURGICAL HISTORY:  has a past surgical history that includes Cholecystectomy (1997); Appendectomy open (1970's); Colonoscopy (9/05, 4/07, 2/11); Colon surgery (9/05); AAA repair / Umbilical hernia repair (6/06); cataract iol, rt/lt (Right, 1/09); cataract iol, rt/lt (Left, 2/09); lysis of adhesions, repair of incisional hernias (2/10); Implant pacemaker (09-16-11); FABRIC WRAPPING OF ABDOMINAL ANEURYSM; Endovascular repair aneurysm abdominal aorta (N/A, 12/16/2015); and Esophagoscopy, gastroscopy, duodenoscopy (EGD), combined (N/A, 3/28/2016).  FAMILY HISTORY: family history includes CANCER in his brother and sister; DIABETES in his paternal grandmother; HEART DISEASE (age of onset: 70) in his father; HEART DISEASE (age of onset: 80) in his mother.  SOCIAL HISTORY:  reports that he quit smoking about 7 years ago. His smoking use included Cigarettes. He has a 50.00 pack-year smoking history. He has never used smokeless tobacco. He reports that he does not drink alcohol or use illicit drugs.    MEDICATIONS:  Current Outpatient Prescriptions   Medication Sig Dispense Refill     HYDROmorphone HCl (DILAUDID PO) Take 2 mg by mouth every 3 hours       acetaminophen (TYLENOL) 325 MG tablet Take 325-650 mg by mouth every 4 hours as needed for mild pain       Potassium Chloride ER 20 MEQ TBCR Take 1 tablet (20 mEq) by mouth daily 90 tablet 1     furosemide (LASIX) 20 MG tablet Take 0.5 tablets (10 mg) by mouth daily 30 tablet prn     PREDNISONE PO Take 5 mg by mouth daily       HydrOXYzine Pamoate (VISTARIL PO) Take 25 mg by mouth every 12 hours And 1 prn dose in 24 hrs       sennosides  (SENOKOT) 8.6 MG tablet Take 2 tablets by mouth every 12 hours       Warfarin Sodium (COUMADIN PO) Take 4.75 mg by mouth daily        METFORMIN HCL PO Take 250 mg by mouth 2 times daily (with meals)       Gabapentin (NEURONTIN PO) Take 300 mg by mouth every 12 hours Reported on 3/27/2017       DULOXETINE HCL PO Take 60 mg by mouth At Bedtime       guaiFENesin (ROBITUSSIN) 100 MG/5ML SYRP Take 10 mLs by mouth every 4 hours as needed for cough       ferrous sulfate (IRON) 325 (65 FE) MG tablet Take 325 mg by mouth 2 times daily Give with orange juice       triamcinolone (KENALOG) 0.1 % ointment Apply topically 2 times daily Apply to Both lower extremities topically every day and evening shift       ammonium lactate (LAC-HYDRIN) 12 % lotion Apply topically 2 times daily       omeprazole (PRILOSEC) 40 MG capsule Take 1 capsule (40 mg) by mouth daily Take 30-60 minutes before a meal. 90 capsule 3     carvedilol (COREG) 12.5 MG tablet Take 6.25 mg by mouth 2 times daily (with meals)       albuterol (PROAIR HFA, PROVENTIL HFA, VENTOLIN HFA) 108 (90 BASE) MCG/ACT inhaler Inhale 2 puffs into the lungs 2 times daily as needed for shortness of breath / dyspnea or wheezing       tamsulosin (FLOMAX) 0.4 MG 24 hr capsule TAKE ONE CAPSULE BY MOUTH EVERY DAY 90 capsule 3     Medications reviewed:  Medications reconciled to facility chart and changes were made to reflect current medications as identified as above med list. Below are the changes that were made:   Medications stopped since last EPIC medication reconciliation:   There are no discontinued medications.    Medications started since last [facility] medication reconciliation:  No orders of the defined types were placed in this encounter.      Case Management:  I have reviewed the care plan and MDS and do agree with the plan. Patient's desire to return to the community is not present.  Information reviewed:  Medications, vital signs, orders, and nursing notes.    ROS:  4 point ROS  "including Respiratory, CV, GI and , other than that noted in the HPI,  is negative    Exam:  Vitals: /77  Pulse 61  Temp 97.9  F (36.6  C)  Resp 18  Ht 5' 9\" (1.753 m)  Wt 157 lb (71.2 kg)  SpO2 96%  BMI 23.18 kg/m2  BMI= Body mass index is 23.18 kg/(m^2).  GENERAL APPEARANCE:  Alert, in no distress, thin, cooperative  ENT:  Mouth and posterior oropharynx normal, moist mucous membranes, [place]  EYES:  EOM, conjunctivae, lids, pupils and irises normal  RESP:  respiratory effort and palpation of chest normal, lungs clear to auscultation , no respiratory distress  CV:  Palpation and auscultation of heart done , regular rate and rhythm, no murmur, rub, or gallop, no edema  ABDOMEN:  normal bowel sounds, soft, nontender, no hepatosplenomegaly or other masses  M/S:   Gait and station abnormal >Depends on staff for transfers, is transported by staff with a w/c  SKIN:  Soft and intact  NEURO:   Cranial nerves 2-12 are normal tested and grossly at patient's baseline  PSYCH:  oriented X 3, insight and judgement impaired, affect and mood normal     BP 06/16-08/13: 110-153/54-78 mmHg    Lab/Diagnostic data:    CBC RESULTS:   Recent Labs   Lab Test  04/19/17   0635  02/13/17   0600   WBC  6.8  7.1   RBC  3.24*  3.29*   HGB  9.4*  9.5*   HCT  30.0*  30.0*   MCV  93  91   MCH  29.0  28.9   MCHC  31.3*  31.7   RDW  14.4  14.4   PLT  161  154       Last Basic Metabolic Panel:  Recent Labs   Lab Test  04/19/17   0635  02/13/17   0600   NA  140  141   POTASSIUM  4.4  4.0   CHLORIDE  104  108   REGAN  8.7  8.6   CO2  30  25   BUN  39*  33*   CR  1.45*  1.34*   GLC  87  88         Lab Results   Component Value Date    A1C 7.0 04/19/2017    A1C 7.8 10/12/2016       ASSESSMENT/PLAN  Type 2 diabetes mellitus with diabetic polyneuropathy, without long-term current use of insulin (H)  Will cont with current low dose of metformin  He is well managed with an A1c < 8.0    Neuropathy (H), 2nd to T2DM  Doing well with current Neurontoin " 300 mg po q 12 hrs    Major depressive disorder, single episode, moderate (H)  He is doing well with the Duloxetine 60 mg po qhs  Decrease of Duloxetine may cause emotional harm  No change in tx    Other chronic pain  Is doing well with Dilaudid 2 mg po q 3 h around the clock    COPD, moderate (H)  He will cont to get the albuterol MD bid and prn for SOB      Orders:  none    Time> 35 min    Electronically signed by:  MARINA Alvarado CNP

## 2017-08-30 NOTE — PROGRESS NOTES
[facility]    HPI:    Suad Sloan is a 86 year old  (12/23/1930), who is being seen today for an episodic care visit at [facility].   HPI information obtained from: facility chart records. Today's concern is INR/Coumadin management for A. Fib    Bleeding Signs/Symptoms:  None  Thromboembolic Signs/Symptoms:  None    Medication Changes:  No  Dietary Changes:  No  Activity Changes: No  Bacterial/Viral Infection:  No    Missed Coumadin Doses:  None    On ASA: No    Other Concerns:  No    OBJECTIVE:    INR Today:  3.0  Current Dose:  4.75 mg po daily    ASSESSMENT:  Paroxysmal atrial fibrillation (H)  His AP is RRR unless he is experiencing a stressor    Long-term (current) use of anticoagulants [Z79.01]  INR is 3.0    Therapeutic INR for goal of 2-3    PLAN:    New Dose: No Change  > he stays in bed 85 % of each day    Next INR: 2 weeks      MARINA Alvarado CNP

## 2017-09-13 NOTE — PROGRESS NOTES
[facility]    HPI:    Suad Sloan is a 86 year old  (12/23/1930), who is being seen today for an episodic care visit at [facility].   HPI information obtained from: facility chart records. Today's concern is INR/Coumadin management for A. Fib    Bleeding Signs/Symptoms:  None  Thromboembolic Signs/Symptoms:  None    Medication Changes:  Yes  Dietary Changes:  No  Activity Changes: No  Bacterial/Viral Infection:  No    Missed Coumadin Doses:  None    On ASA: No    Other Concerns:  No    OBJECTIVE:    INR Today:  3.4  Current Dose:  4.75 mg po daily    ASSESSMENT:  Encounter for monitoring coumadin therapy  INR 3.4>> has had a regent med change per demand from wife    Paroxysmal atrial fibrillation (H)  Generally AP is RRR    Supratherapeutic INR for goal of 2-3    PLAN:    New Dose: 4.5 mg po daily      Next INR: 1 week      MARINA Alvarado CNP

## 2017-09-20 NOTE — PROGRESS NOTES
[place] [facility]    HPI:    Suad Sloan is a 86 year old  (12/23/1930), who is being seen today for an episodic care visit at [facility] [place].   HPI information obtained from: facility chart records. Today's concern is INR/Coumadin management for GERARDO. Rachel    Bleeding Signs/Symptoms:  None  Thromboembolic Signs/Symptoms:  None    Medication Changes:  Yes  Dietary Changes:  No  Activity Changes: No  Bacterial/Viral Infection:  No    Missed Coumadin Doses:  None    On ASA: No    Other Concerns:  Yes> uncertain why the INR is suddenly so high, I did decrease the Neurontoin 1 week ago    OBJECTIVE:    INR Today:  4.3  Current Dose:  4.5 mg po daily>     ASSESSMENT:  Long-term (current) use of anticoagulants [Z79.01]  He is mostly bed fast so continuing with coumadin appears appropriate    Paroxysmal atrial fibrillation (H)  Is mostly RRR    Supratherapeutic INR for goal of 2-3    PLAN:    New Dose: no coumadin 9-20 and 9-21      Next INR: 9.      MARINA Alvarado CNP

## 2017-09-22 NOTE — PROGRESS NOTES
[place] [facility]    HPI:    Suad Sloan is a 86 year old  (12/23/1930), who is being seen today for an episodic care visit at [facility].   HPI information obtained from: facility chart records. Today's concern is INR/Coumadin management for GERARDO. Fib    Bleeding Signs/Symptoms:  None  Thromboembolic Signs/Symptoms:  None    Medication Changes:  Yes  Dietary Changes:  Yes  Activity Changes: Yes  Bacterial/Viral Infection:  Yes: new UTI    Missed Coumadin Doses:  Was off coumadin for 2 days 2nd to high INR on 9-    On ASA: No    Other Concerns:  No    OBJECTIVE:    INR Today:  2.9  Current Dose:  None for 2 days    ASSESSMENT:  Urinary tract infection without hematuria, site unspecified  Proteus mirabilis UTI  Was started on Levaquin 250 mg po daily x 7 days today    Paroxysmal atrial fibrillation (H)  Was RRR    Long-term (current) use of anticoagulants [Z79.01]  Last INR was 4.3 on 9-    Therapeutic INR for goal of 2-3    PLAN:    New Dose: coumadin 2.5 mg po daily      Next INR: 9-    MARINA Alvarado CNP

## 2017-09-25 PROBLEM — N30.00 ACUTE CYSTITIS WITHOUT HEMATURIA: Status: ACTIVE | Noted: 2017-01-01

## 2017-09-25 NOTE — PROGRESS NOTES
[place] [facility]    Chief Complaint   Patient presents with     Nursing Home Acute       HPI:    Suad Sloan is a 86 year old  (12/23/1930), who is being seen today for an episodic care visit at [facility].  HPI information obtained from: facility chart records.  Today's concern is:  Anxiety disorder, unspecified type  He stated that he was feeling much better    Other chronic pain  He stated that his pain is well controlled  And yes he does want to be awakened at night q 3 hrs for pain meds    Chronic systolic congestive heart failure (H)  He denied having edema    CKD (chronic kidney disease) stage 3, GFR 30-59 ml/min  GFR 50 and creat 1.34    COPD, moderate (H)  He stated that he often feels SOB and then asks to use the 02 2 L      ALLERGIES: Ace inhibitors; Cleocin; Dulera; Erythromycin; Hydralazine; Imdur [isosorbide]; Methadone; Penicillins; and Spiriva handihaler  Past Medical, Surgical, Family and Social History reviewed and updated in [facility].    Current Outpatient Prescriptions   Medication Sig Dispense Refill     WARFARIN SODIUM PO Take 2.5 mg by mouth daily       LevoFLOXacin (LEVAQUIN PO) Take 250 mg by mouth daily       HYDROmorphone HCl (DILAUDID PO) Take 2 mg by mouth every 3 hours       acetaminophen (TYLENOL) 325 MG tablet Take 325-650 mg by mouth every 4 hours as needed for mild pain       Potassium Chloride ER 20 MEQ TBCR Take 1 tablet (20 mEq) by mouth daily 90 tablet 1     furosemide (LASIX) 20 MG tablet Take 0.5 tablets (10 mg) by mouth daily 30 tablet prn     PREDNISONE PO Take 5 mg by mouth daily       HydrOXYzine Pamoate (VISTARIL PO) Take 25 mg by mouth every 12 hours And 1 prn dose in 24 hrs       sennosides (SENOKOT) 8.6 MG tablet Take 2 tablets by mouth every 12 hours And give 2 tablets by mouth daily PRN       METFORMIN HCL PO Take 250 mg by mouth 2 times daily (with meals)       Gabapentin (NEURONTIN PO) Take 200 mg by mouth every 12 hours  "Reported on 3/27/2017       DULOXETINE HCL PO Take 60 mg by mouth At Bedtime       guaiFENesin (ROBITUSSIN) 100 MG/5ML SYRP Take 10 mLs by mouth every 4 hours as needed for cough       ferrous sulfate (IRON) 325 (65 FE) MG tablet Take 325 mg by mouth 2 times daily Give with orange juice       triamcinolone (KENALOG) 0.1 % ointment Apply topically 2 times daily Apply to Both lower extremities topically every day and evening shift       ammonium lactate (LAC-HYDRIN) 12 % lotion Apply topically 2 times daily       omeprazole (PRILOSEC) 40 MG capsule Take 1 capsule (40 mg) by mouth daily Take 30-60 minutes before a meal. 90 capsule 3     carvedilol (COREG) 12.5 MG tablet Take 6.25 mg by mouth 2 times daily (with meals)       albuterol (PROAIR HFA, PROVENTIL HFA, VENTOLIN HFA) 108 (90 BASE) MCG/ACT inhaler Inhale 2 puffs into the lungs 2 times daily as needed for shortness of breath / dyspnea or wheezing       tamsulosin (FLOMAX) 0.4 MG 24 hr capsule TAKE ONE CAPSULE BY MOUTH EVERY DAY 90 capsule 3     Medications reviewed:  Medications reconciled to facility chart and changes were made to reflect current medications as identified as above med list. Below are the changes that were made:   Medications stopped since last EPIC medication reconciliation:   There are no discontinued medications.    Medications started since last [facility] medication reconciliation:  No orders of the defined types were placed in this encounter.      REVIEW OF SYSTEMS:  4 point ROS including Respiratory, CV, GI and , other than that noted in the HPI,  is negative    Physical Exam:  /57  Pulse 61  Temp 98.8  F (37.1  C)  Resp 16  Ht 5' 9\" (1.753 m)  Wt 162 lb 12.8 oz (73.8 kg)  SpO2 96%  BMI 24.04 kg/m2  GENERAL APPEARANCE:  Alert, in no distress, cooperative  ENT:  Mouth and posterior oropharynx normal, moist mucous membranes, [place], doing well in 1:1  EYES:  EOM, conjunctivae, lids, pupils and irises normal  RESP:  respiratory effort " "and palpation of chest normal, lungs clear to auscultation , no respiratory distress, using 02 2 L during my visit  CV:  Palpation and auscultation of heart done , regular rate and rhythm, no murmur, rub, or gallop, no edema  ABDOMEN:  normal bowel sounds, soft, nontender, no hepatosplenomegaly or other masses  M/S:   Gait and station abnormal >Depends on staff for transfers, is transported by staff with a w/c> he does not weight bear  SKIN:  Soft and intact  NEURO:   Cranial nerves 2-12 are normal tested and grossly at patient's baseline  PSYCH:  oriented to himself and his surroundings, insight and judgement impaired, affect and mood normal     BP 07/09-09/24:104-140/54-78 mmHg    Recent Labs:    CBC RESULTS:   Recent Labs   Lab Test  09/22/17   0600  09/21/17   0655   WBC  7.0  7.6   RBC  3.54*  3.69*   HGB  10.0*  10.5*   HCT  31.7*  32.9*   MCV  90  89   MCH  28.2  28.5   MCHC  31.5  31.9   RDW  14.0  14.1   PLT  183  190       Last Basic Metabolic Panel:  Recent Labs   Lab Test  09/22/17   0600  09/21/17   0655   NA  140  139   POTASSIUM  4.0  4.2   CHLORIDE  105  104   REGAN  8.6  9.0   CO2  27  25   BUN  29  29   CR  1.34*  1.45*   GLC  105*  133*       Lab Results   Component Value Date    A1C 7.0 04/19/2017    A1C 7.8 10/12/2016       Assessment/Plan:  Anxiety disorder, unspecified type  He is doing fair with current dose of Vistaril  Decrease of vistaril may cause emotional harm  No change in tx    Other chronic pain  He clearly stated that \"yes\" he wants to be awakened at night q 3 hrs to receive the Dilaudid    Chronic systolic congestive heart failure (H)  He is free of edema     CKD (chronic kidney disease) stage 3, GFR 30-59 ml/min  Will renally adjust meds in the future to avoid stressing the renal system    COPD, moderate (H)  He wishes to use th 02 2 L when he perceives SOB  Will cont with the prn order      Orders:  none    35 min    Electronically signed by  MARINA Alvarado " CNP

## 2017-09-26 NOTE — PROGRESS NOTES
[place] [facility]    HPI:    Suad Sloan is a 86 year old  (12/23/1930), who is being seen today for an episodic care visit at [facility].   HPI information obtained from: facility chart records. Today's concern is INR/Coumadin management for A. Fib    Bleeding Signs/Symptoms:  None  Thromboembolic Signs/Symptoms:  None    Medication Changes:  Yes  Dietary Changes:  No  Activity Changes: Yes  Bacterial/Viral Infection:  Yes> finished ABX for UTI    Missed Coumadin Doses:  None    On ASA: No    Other Concerns:  No    OBJECTIVE:    INR Today:  1.9  Current Dose:  2.5 mg po daily    ASSESSMENT:  Paroxysmal atrial fibrillation (H)  AP is RRR again    Long-term (current) use of anticoagulants [Z79.01]  Has periodic INR's according to past results    Therapeutic INR for goal of 2-3    PLAN:    New Dose: 3 mg po daily      Next INR: 1 week      MARINA Alvarado CNP

## 2017-10-02 NOTE — PROGRESS NOTES
Ortho Nursing home visit    Suad Sloan is a 86 year old male who resides at [facility], 2nd floor    Patient is seen today for left hip pain , x-rays reviewed prior to exam:      Past Medical History:   Diagnosis Date     AAA (abdominal aortic aneurysm) (H) 9/05    AAA, Lt Iliac - Dr Garcia     Alcohol dependence (H)     quit 2003     Anxiety      Arthritis of hands     hands/feet - dr varela     Atrial fibrillation (H)     on coumadin -  Dr Huddleston     Bladder cancer (H) 5/06    dr rodriguez- cyst removed and bx was benign     Cardiomyopathy (H)     EF 35-40% on 12/2014 Echo     Chronic pain      Chronic systolic congestive heart failure (H)     EF 35-40% on 12/2014 Echo     CKD (chronic kidney disease) stage 3, GFR 30-59 ml/min      Closed fracture of neck of left femur (H) 9/26/2016     Closed fracture of neck of left femur (H) 9/26/2016     Colon cancer (H) 9/05    Grade IIA adenoca -Stage 2 T3N0N0 - Dr Felder, Dr Hampton tubular adenoma, 2 cm colon ca mass     COPD, moderate (H)      Coronary atherosclerosis 1995    Dr Huddleston     Depression      Diverticulosis of colon (without mention of hemorrhage)      GI bleeding 3/24/2016     Hyperlipidemia      Hypertension     resolved     Hypoxia 8/26/2014     Iliac artery aneurysm, left (H)      Iron deficiency anemia 7/05     Left leg cellulitis 7/4/2016     Malignant neoplasm of prostate (H) 1994    s/p radiation     Peripheral neuropathy (H)      Protein deficiency (H), Albumin2.4  7- 7/25/2016     Pulmonary hypertension (H)      Respiratory failure (H) 12/5/2014     Restless leg syndrome      Sleep apnea 6/10    moderate - auto ASV - dr goltz/darvin     Sleep apnea     moderate - BiPAP 10/5 - with chenye ruffin breathing      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    PPM     Type 2 diabetes mellitus (H) 2005     Vertigo       Past Surgical History:   Procedure Laterality Date     AAA repair / Umbilical hernia repair  6/06    dr garcia      APPENDECTOMY OPEN   1970's     C FABRIC WRAPPING OF ABDOMINAL ANEURYSM       CATARACT IOL, RT/LT Right 1/09    dr kurtz     CATARACT IOL, RT/LT Left 2/09    dr kurtz     CHOLECYSTECTOMY  1997     COLON SURGERY  9/05    colon ca, dr lyon,      COLONOSCOPY  9/05, 4/07, 2/11    tubular adenoma - due 3 yrs     ENDOVASCULAR REPAIR ANEURYSM ABDOMINAL AORTA N/A 12/16/2015    ENDOVASCULAR REPAIR ANEURYSM ABDOMINAL AORTA;  Surgeon: Timmy Abdalla MD;  Location: [facility] OR     ESOPHAGOSCOPY, GASTROSCOPY, DUODENOSCOPY (EGD), COMBINED N/A 3/28/2016    Esophagitis, small erosions, inflammtory nodule     IMPLANT PACEMAKER  09-16-11     lysis of adhesions, repair of incisional hernias  2/10        Allergies   Allergen Reactions     Ace Inhibitors      hyperkalemia     Cleocin      Severe Heartburn     Dulera      Leg cramps, gas, mouth sores     Erythromycin      upset stomach     Hydralazine      Throat swelling     Imdur [Isosorbide]      Stomach upset     Methadone Other (See Comments)     Became very confused and too sedated     Penicillins Nausea     Spiriva Handihaler      Mouth sores, leg cramps      /57  Pulse 61  Temp 98.8  F (37.1  C)  Resp 16  Wt 162 lb 12.8 oz (73.8 kg)  SpO2 95%  BMI 24.04 kg/m2     Exam:      X-rays show > see notes    ASSESSMENT / PLAN:  (S72.002K) 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

## 2017-10-02 NOTE — PROGRESS NOTES
[place] [facility]    Chief Complaint   Patient presents with     RECHECK     Hip Pain       HPI:    Suad Sloan is a 86 year old  (12/23/1930), who is being seen today for an episodic care visit at [facility].  HPI information obtained from: facility chart records.  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    Current Outpatient Prescriptions   Medication Sig Dispense Refill     WARFARIN SODIUM PO Take 3 mg by mouth        HYDROmorphone HCl (DILAUDID PO) Take 2 mg by mouth every 3 hours       acetaminophen (TYLENOL) 325 MG tablet Take 325-650 mg by mouth every 4 hours as needed for mild pain       Potassium Chloride ER 20 MEQ TBCR Take 1 tablet (20 mEq) by mouth daily 90 tablet 1     furosemide (LASIX) 20 MG tablet Take 0.5 tablets (10 mg) by mouth daily 30 tablet prn     PREDNISONE PO Take 5 mg by mouth daily       HydrOXYzine Pamoate (VISTARIL PO) Take 25 mg by mouth every 12 hours And 1 prn dose in 24 hrs       sennosides (SENOKOT) 8.6 MG tablet Take 2 tablets by mouth every 12 hours And give 2 tablets by mouth daily PRN       METFORMIN HCL PO Take 250 mg by mouth 2 times daily (with meals)       Gabapentin (NEURONTIN PO) Take 200 mg by mouth every 12 hours Reported on 3/27/2017       DULOXETINE  HCL PO Take 60 mg by mouth At Bedtime       guaiFENesin (ROBITUSSIN) 100 MG/5ML SYRP Take 10 mLs by mouth every 4 hours as needed for cough       ferrous sulfate (IRON) 325 (65 FE) MG tablet Take 325 mg by mouth 2 times daily Give with orange juice       triamcinolone (KENALOG) 0.1 % ointment Apply topically 2 times daily Apply to Both lower extremities topically every day and evening shift       ammonium lactate (LAC-HYDRIN) 12 % lotion Apply topically 2 times daily       omeprazole (PRILOSEC) 40 MG capsule Take 1 capsule (40 mg) by mouth daily Take 30-60 minutes before a meal. 90 capsule 3     carvedilol (COREG) 12.5 MG tablet Take 6.25 mg by mouth 2 times daily (with meals)       albuterol (PROAIR HFA, PROVENTIL HFA, VENTOLIN HFA) 108 (90 BASE) MCG/ACT inhaler Inhale 2 puffs into the lungs 2 times daily as needed for shortness of breath / dyspnea or wheezing       tamsulosin (FLOMAX) 0.4 MG 24 hr capsule TAKE ONE CAPSULE BY MOUTH EVERY DAY 90 capsule 3     Medications reviewed:  Medications reconciled to facility chart and changes were made to reflect current medications as identified as above med list. Below are the changes that were made:   Medications stopped since last EPIC medication reconciliation:   There are no discontinued medications.    Medications started since last [facility] medication reconciliation:  No orders of the defined types were placed in this encounter.    REVIEW OF SYSTEMS:  4 point ROS including Respiratory, CV, GI and , other than that noted in the HPI,  is negative    Physical Exam:  /57  Pulse 61  Temp 98.8  F (37.1  C)  Resp 16  Wt 162 lb 12.8 oz (73.8 kg)  SpO2 95%  BMI 24.04 kg/m2  GENERAL APPEARANCE:  Alert, in no distress, thin, anxious  M/S:   Gait and station abnormal > he was resting in bed  His L hip and thigh are slightly abducted  He was moving his toes in both LE  NEURO:   Cranial nerves 2-12 are normal tested and grossly at patient's baseline  PSYCH:  oriented to  himself, insight and judgement impaired, memory impaired , affect and mood normal    Recent Labs:    CBC RESULTS:   Recent Labs   Lab Test  09/22/17   0600  09/21/17   0655   WBC  7.0  7.6   RBC  3.54*  3.69*   HGB  10.0*  10.5*   HCT  31.7*  32.9*   MCV  90  89   MCH  28.2  28.5   MCHC  31.5  31.9   RDW  14.0  14.1   PLT  183  190       Last Basic Metabolic Panel:  Recent Labs   Lab Test  09/22/17   0600  09/21/17   0655   NA  140  139   POTASSIUM  4.0  4.2   CHLORIDE  105  104   REGAN  8.6  9.0   CO2  27  25   BUN  29  29   CR  1.34*  1.45*   GLC  105*  133*       Assessment/Plan:  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, subsequent encounter  To appease him and his wife  I did order a repeat x-ray of the L hip> series  I did contact Daquan snow PA  He will review the x-ray  He will probably talk with Suad and his wife    Other chronic pain  Will cont with the current dilaudid 2 mg po q 3 hrs scheduled    Progress Notes  Unsigned   Encounter Date: 10/2/2017  JEANMARIE Rockwell PAGabbyC   Physician Assistant   Expand All Collapse All    []Hide copied text  []Cheli for attribution information  Ortho Nursing home visit     Suad Sloan is a 86 year old male who resides at [facility], 2nd floor     Patient is seen today for left hip pain , x-rays reviewed prior to exam:         Past Medical History         Past Medical History:   Diagnosis Date     AAA (abdominal aortic aneurysm) (H) 9/05     AAA, Lt Iliac - Dr Abdalla     Alcohol dependence (H)       quit 2003     Anxiety       Arthritis of hands       hands/feet - dr varela     Atrial fibrillation (H)       on coumadin -  Dr Huddleston     Bladder cancer (H) 5/06     dr rodriguez- cyst removed and bx was benign     Cardiomyopathy (H)       EF 35-40% on 12/2014 Echo     Chronic pain       Chronic systolic congestive heart failure (H)       EF 35-40% on 12/2014 Echo     CKD (chronic kidney disease) stage 3, GFR 30-59 ml/min       Closed fracture of neck of  left femur (H) 9/26/2016     Closed fracture of neck of left femur (H) 9/26/2016     Colon cancer (H) 9/05     Grade IIA adenoca -Stage 2 T3N0N0 - Dr Felder, Dr Hampton tubular adenoma, 2 cm colon ca mass     COPD, moderate (H)       Coronary atherosclerosis 1995     Dr Flaquito Fuentes       Diverticulosis of colon (without mention of hemorrhage)       GI bleeding 3/24/2016     Hyperlipidemia       Hypertension       resolved     Hypoxia 8/26/2014     Iliac artery aneurysm, left (H)       Iron deficiency anemia 7/05     Left leg cellulitis 7/4/2016     Malignant neoplasm of prostate (H) 1994     s/p radiation     Peripheral neuropathy (H)       Protein deficiency (H), Albumin2.4  7- 7/25/2016     Pulmonary hypertension (H)       Respiratory failure (H) 12/5/2014     Restless leg syndrome       Sleep apnea 6/10     moderate - auto ASV - dr goltz/darvin     Sleep apnea       moderate - BiPAP 10/5 - with chenye ruffin breathing      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     PPM     Type 2 diabetes mellitus (H) 2005     Vertigo            Past Surgical History    Past Surgical History:   Procedure Laterality Date     AAA repair / Umbilical hernia repair   6/06     dr garcia      APPENDECTOMY OPEN   1970's     C FABRIC WRAPPING OF ABDOMINAL ANEURYSM         CATARACT IOL, RT/LT Right 1/09     dr kurtz     CATARACT IOL, RT/LT Left 2/09     dr kurtz     CHOLECYSTECTOMY   1997     COLON SURGERY   9/05     colon ca, dr hampton,      COLONOSCOPY   9/05, 4/07, 2/11     tubular adenoma - due 3 yrs     ENDOVASCULAR REPAIR ANEURYSM ABDOMINAL AORTA N/A 12/16/2015     ENDOVASCULAR REPAIR ANEURYSM ABDOMINAL AORTA;  Surgeon: Timmy Garcia MD;  Location:  OR     ESOPHAGOSCOPY, GASTROSCOPY, DUODENOSCOPY (EGD), COMBINED N/A 3/28/2016     Esophagitis, small erosions, inflammtory nodule     IMPLANT PACEMAKER   09-16-11     lysis of adhesions, repair of incisional hernias   2/10            Allergies   Allergen Reactions      Ace Inhibitors         hyperkalemia     Cleocin         Severe Heartburn     Dulera         Leg cramps, gas, mouth sores     Erythromycin         upset stomach     Hydralazine         Throat swelling     Imdur [Isosorbide]         Stomach upset     Methadone Other (See Comments)       Became very confused and too sedated     Penicillins Nausea     Spiriva Handihaler         Mouth sores, leg cramps      /57  Pulse 61  Temp 98.8  F (37.1  C)  Resp 16  Wt 162 lb 12.8 oz (73.8 kg)  SpO2 95%  BMI 24.04 kg/m2      Exam:        X-rays show > no new changes     ASSESSMENT / PLAN:  (S72.002K) 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, subsequent encounter  (primary encounter diagnosis)  Comment: Pain with PROM; expected as patient has prior hip fx; non-union,   Plan: Continue to treat pain, angela lift, TERRANCE     (G89.29) Other chronic pain  Comment: continue to observe  Plan: Resume previous activity                 Roger OPA-C With Caro Warner NP  [phone]              Nursing Home Visit on 10/2/2017              Detailed Report             Orders:  See A&P    Total time spent with patient visit at the skilled nursing facility was 45 min including patient visit, review of past records, phone call to patient contact and of wife. Greater than 50% of total time spent with counseling and coordinating care due to conplex care issues    Electronically signed by  MARINA Alvarado CNP

## 2017-10-03 NOTE — PROGRESS NOTES
[place] [facility]    HPI:    Suad Sloan is a 86 year old  (12/23/1930), who is being seen today for an episodic care visit at [facility].   HPI information obtained from: facility chart records, facility staff, patient report and [facility] chart review. Today's concern is INR/Coumadin management for A. Fib    Bleeding Signs/Symptoms:  None  Thromboembolic Signs/Symptoms:  None    Medication Changes:  No  Dietary Changes:  No  Activity Changes: No  Bacterial/Viral Infection:  No    Missed Coumadin Doses:  None    On ASA: No    Other Concerns:  No    OBJECTIVE:    INR Today:  2.1  Current Dose:   3 mg po daily    ASSESSMENT:  Paroxysmal atrial fibrillation (H)  AP has been RRR    Encounter for monitoring coumadin therapy  He had recent medication adjustments which cause problems with INR    Therapeutic INR for goal of 2-3    PLAN:    New Dose: No Change      Next INR: 2 weeks      MARINA Alvarado CNP

## 2017-10-05 NOTE — PROGRESS NOTES
[place] [facility]    Chief Complaint   Patient presents with     senior care Regulatory       HPI:    Suad Sloan is a 86 year old  (12/23/1930), who is being seen today for a federally mandated E/M visit at [facility].  HPI information obtained from: facility chart records, facility staff, patient report and [facility] chart review. Today's concerns are:  1. 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 Multifocal motor neuropathy (H)    3. 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 Other chronic pain    5. COPD, moderate (H)    6. Type 2 diabetes mellitus with diabetic neuropathy, without long-term current use of insulin (H)    7. Chronic systolic congestive heart failure (H)        ALLERGIES: Ace inhibitors; Cleocin; Dulera; Erythromycin; Hydralazine; Imdur [isosorbide]; Methadone; Penicillins; and Spiriva handihaler  PAST MEDICAL HISTORY:  has a past medical history of AAA (abdominal aortic aneurysm) (H) (9/05); Alcohol dependence (H); Anxiety; Arthritis of hands; Atrial fibrillation (H); Bladder cancer (H) (5/06); Cardiomyopathy (H); Chronic pain; Chronic systolic congestive heart failure (H); CKD (chronic kidney disease) stage 3, GFR 30-59 ml/min; Closed fracture of neck of left femur (H) (9/26/2016); Closed fracture of neck of left femur (H) (9/26/2016); Colon cancer (H) (9/05); COPD, moderate (H); Coronary atherosclerosis (1995); Depression; Diverticulosis of colon (without mention of hemorrhage); GI bleeding (3/24/2016); Hyperlipidemia; Hypertension; Hypoxia (8/26/2014); Iliac artery aneurysm, left (H); Iron deficiency anemia (7/05); Left leg cellulitis (7/4/2016); Malignant neoplasm of prostate (H) (1994); Peripheral neuropathy; Protein deficiency (H), Albumin2.4  7- (7/25/2016); Pulmonary hypertension; Respiratory failure (H) (12/5/2014); Restless leg syndrome; Sleep apnea (6/10); Sleep apnea; 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  PAST SURGICAL HISTORY:  has a past surgical history that includes Cholecystectomy (1997); Appendectomy open (1970's); Colonoscopy (9/05, 4/07, 2/11); Colon surgery (9/05); AAA repair / Umbilical hernia repair (6/06); cataract iol, rt/lt (Right, 1/09); cataract iol, rt/lt (Left, 2/09); lysis of adhesions, repair of incisional hernias (2/10); Implant pacemaker (09-16-11); FABRIC WRAPPING OF ABDOMINAL ANEURYSM; Endovascular repair aneurysm abdominal aorta (N/A, 12/16/2015); and Esophagoscopy, gastroscopy, duodenoscopy (EGD), combined (N/A, 3/28/2016).  FAMILY HISTORY: family history includes CANCER in his brother and sister; DIABETES in his paternal grandmother; HEART DISEASE (age of onset: 70) in his father; HEART DISEASE (age of onset: 80) in his mother.  SOCIAL HISTORY:  reports that he quit smoking about 7 years ago. His smoking use included Cigarettes. He has a 50.00 pack-year smoking history. He has never used smokeless tobacco. He reports that he does not drink alcohol or use illicit drugs.    MEDICATIONS:  Current Outpatient Prescriptions   Medication Sig Dispense Refill     WARFARIN SODIUM PO Take 3 mg by mouth        HYDROmorphone HCl (DILAUDID PO) Take 2 mg by mouth every 3 hours       acetaminophen (TYLENOL) 325 MG tablet Take 325-650 mg by mouth every 4 hours as needed for mild pain       Potassium Chloride ER 20 MEQ TBCR Take 1 tablet (20 mEq) by mouth daily 90 tablet 1     furosemide (LASIX) 20 MG tablet Take 0.5 tablets (10 mg) by mouth daily 30 tablet prn     PREDNISONE PO Take 5 mg by mouth daily       HydrOXYzine Pamoate (VISTARIL PO) Take 25 mg by mouth every 12 hours And 1 prn dose in 24 hrs       sennosides (SENOKOT) 8.6 MG tablet Take 2 tablets by mouth every 12 hours And give 2 tablets by mouth daily PRN       METFORMIN HCL PO Take 250 mg by mouth 2 times daily (with meals)       Gabapentin (NEURONTIN PO) Take 200 mg by mouth every 12 hours  "Reported on 3/27/2017       DULOXETINE HCL PO Take 60 mg by mouth At Bedtime       guaiFENesin (ROBITUSSIN) 100 MG/5ML SYRP Take 10 mLs by mouth every 4 hours as needed for cough       ferrous sulfate (IRON) 325 (65 FE) MG tablet Take 325 mg by mouth 2 times daily Give with orange juice       triamcinolone (KENALOG) 0.1 % ointment Apply topically 2 times daily Apply to Both lower extremities topically every day and evening shift       ammonium lactate (LAC-HYDRIN) 12 % lotion Apply topically 2 times daily       omeprazole (PRILOSEC) 40 MG capsule Take 1 capsule (40 mg) by mouth daily Take 30-60 minutes before a meal. 90 capsule 3     carvedilol (COREG) 12.5 MG tablet Take 6.25 mg by mouth 2 times daily (with meals)       albuterol (PROAIR HFA, PROVENTIL HFA, VENTOLIN HFA) 108 (90 BASE) MCG/ACT inhaler Inhale 2 puffs into the lungs 2 times daily as needed for shortness of breath / dyspnea or wheezing       tamsulosin (FLOMAX) 0.4 MG 24 hr capsule TAKE ONE CAPSULE BY MOUTH EVERY DAY 90 capsule 3     Medications reviewed:  Medications reconciled to facility chart and changes were made to reflect current medications as identified as above med list. Below are the changes that were made:   Medications stopped since last EPIC medication reconciliation:   There are no discontinued medications.    Medications started since last [facility] medication reconciliation:  No orders of the defined types were placed in this encounter.        ROS:  4 point ROS including Respiratory, CV, GI and , other than that noted in the HPI,  is negative  Left hip pain issues with non union secondary to fracture; he recalls reviewing findings with Ortho and GNP  Atrial Fibrillation- well controlled, also paced    Exam:  Vitals: /57  Pulse 61  Temp 98.8  F (37.1  C)  Resp 16  Ht 5' 9\" (1.753 m)  Wt 162 lb 12.8 oz (73.8 kg)  SpO2 97%  BMI 24.04 kg/m2  BMI= Body mass index is 24.04 kg/(m^2).  GENERAL APPEARANCE:  Alert; resting in " bed  RESP:  lungs clear to auscultation , no respiratory distress  CV:  regular rate and rhythm ; pacemaker noted  ABDOMEN:  Soft, nontender bowel sounds present  M/S:   Nonambulatory, transfers to Ronal sling when moves to wheelchair  NEURO:   conversant  PSYCH:  oriented X 3    Lab/Diagnostic data:   CBC RESULTS:   Recent Labs   Lab Test  09/22/17   0600  09/21/17   0655   WBC  7.0  7.6   RBC  3.54*  3.69*   HGB  10.0*  10.5*   HCT  31.7*  32.9*   MCV  90  89   MCH  28.2  28.5   MCHC  31.5  31.9   RDW  14.0  14.1   PLT  183  190       Last Basic Metabolic Panel:  Recent Labs   Lab Test  09/22/17   0600  09/21/17   0655   NA  140  139   POTASSIUM  4.0  4.2   CHLORIDE  105  104   REGAN  8.6  9.0   CO2  27  25   BUN  29  29   CR  1.34*  1.45*   GLC  105*  133*       Liver Function Studies -   Recent Labs   Lab Test  07/25/16   0700  07/18/16   1450   PROTTOTAL  7.0  7.0   ALBUMIN  2.4*  2.3*   BILITOTAL  0.5  0.5   ALKPHOS  164*  171*   AST  18  18   ALT  12  15       TSH   Date Value Ref Range Status   05/06/2015 0.60 0.40 - 4.00 mU/L Final   08/07/2014 0.80 0.40 - 4.00 mU/L Final     Comment:     Effective 7/30/2014, the reference range for this assay has changed to reflect   new instrumentation/methodology.       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    (G89.29) Other chronic pain  Comment: multifactorial; chronic gout, nonunion left hip fracture, etc;  pain clinic prior to hip fracture.  Plan: continue current meds    (J44.9) COPD, moderate  (H)  Comment: oxygen in placed; he reports 100% today  Plan: discussed benefits of oxygen; given 100 % O2 saturation, he is agreeable to go without supplement oxygen today and periodically reassess.    (E11.40) Type 2 diabetes mellitus with diabetic neuropathy, without long-term current use of insulin (H)  Comment:   Lab Results   Component Value Date    A1C 7.0 04/19/2017     Plan: doing well on low dose Metformin ( 500 mg daily dose- split dosing.    (I50.22) Chronic systolic congestive heart failure (H)  Comment: overall, doing well ; no orthopnea, chest pain  or edema  Plan: continue same meds    Bridget Valenzuela MD  Internal Medicine  electronically signed

## 2017-10-06 NOTE — MR AVS SNAPSHOT
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  Normal or non-critical lab and imaging results will be communicated to you by [employer], letter or phone within 4 business days after the clinic has received the results. If you do not hear from us within 7 days, please contact the clinic through [employer] or phone. If you have a critical or abnormal lab result, we will notify you by phone as soon as possible.  Submit refill requests through [employer] or call your pharmacy and they will forward the refill request to us. Please allow 3 business days for your refill to be completed.          Additional Information About Your Visit        [employer] Information     [employer] gives you secure access to your electronic health record. If you see a primary care provider, you can also send messages to your care team and make appointments. If you have questions, please call your primary care clinic.  If you do not have a primary care provider, please call [phone] and they will assist you.        Care EveryWhere ID     This is your Care EveryWhere ID. This could be used by other organizations to  "access your [facility] medical records  [id number]        Your Vitals Were     Pulse Temperature Respirations Height Pulse Oximetry BMI (Body Mass Index)    61 98.8  F (37.1  C) 16 5' 9\" (1.753 m) 97% 24.04 kg/m2       Blood Pressure from Last 3 Encounters:   10/05/17 118/57   10/03/17 118/57   10/02/17 118/57    Weight from Last 3 Encounters:   10/05/17 162 lb 12.8 oz (73.8 kg)   10/03/17 162 lb 12.8 oz (73.8 kg)   10/02/17 162 lb 12.8 oz (73.8 kg)              Today, you had the following     No orders found for display       Primary Care Provider Office Phone # Fax #    Caro Jensen Alana, APRN [facility] [phone]       [address]    [place] [address]        Equal Access to Services     JOSS DAVIS : Hadii aad ku hadasho Soomaali, waaxda luqadaha, qaybta kaalmada adeegyada, waxay idiin hayaan vicki kharaodell pickard . So [facility] [phone].    ATENCIÓN: Si nicole weston, tiene a lino disposición servicios gratuitos de asistencia lingüística. Llame al [phone].    We comply with applicable federal civil rights laws and [place] laws. We do not discriminate on the basis of race, color, national origin, age, disability, sex, sexual orientation, or gender identity.            Thank you!     Thank you for choosing [facility]  for your care. Our goal is always to provide you with excellent care. Hearing back from our patients is one way we can continue to improve our services. Please take a few minutes to complete the written survey that you may receive in the mail after your visit with us. Thank you!             Your Updated Medication List - Protect others around you: Learn how to safely use, store and throw away your medicines at www.disposemymeds.org.          This list is accurate as of: 10/6/17  1:27 PM.  Always use your most recent med list.                   Brand Name Dispense Instructions for use Diagnosis    acetaminophen 325 MG tablet    TYLENOL     Take " 325-650 mg by mouth every 4 hours as needed for mild pain        albuterol 108 (90 BASE) MCG/ACT Inhaler    PROAIR HFA/PROVENTIL HFA/VENTOLIN HFA     Inhale 2 puffs into the lungs 2 times daily as needed for shortness of breath / dyspnea or wheezing        ammonium lactate 12 % lotion    LAC-HYDRIN     Apply topically 2 times daily        carvedilol 12.5 MG tablet    COREG     Take 6.25 mg by mouth 2 times daily (with meals)        DILAUDID PO      Take 2 mg by mouth every 3 hours        DULOXETINE HCL PO      Take 60 mg by mouth At Bedtime        ferrous sulfate 325 (65 FE) MG tablet    IRON     Take 325 mg by mouth 2 times daily Give with orange juice        furosemide 20 MG tablet    LASIX    30 tablet    Take 0.5 tablets (10 mg) by mouth daily    Diastolic congestive heart failure, unspecified congestive heart failure chronicity (H)       guaiFENesin 100 MG/5ML Syrp    ROBITUSSIN     Take 10 mLs by mouth every 4 hours as needed for cough        METFORMIN HCL PO      Take 250 mg by mouth 2 times daily (with meals)        NEURONTIN PO      Take 200 mg by mouth every 12 hours Reported on 3/27/2017        omeprazole 40 MG capsule    priLOSEC    90 capsule    Take 1 capsule (40 mg) by mouth daily Take 30-60 minutes before a meal.    Acute upper gastrointestinal hemorrhage       Potassium Chloride ER 20 MEQ Tbcr     90 tablet    Take 1 tablet (20 mEq) by mouth daily    Hypokalemia       PREDNISONE PO      Take 5 mg by mouth daily        sennosides 8.6 MG tablet    SENOKOT     Take 2 tablets by mouth every 12 hours And give 2 tablets by mouth daily PRN        tamsulosin 0.4 MG capsule    FLOMAX    90 capsule    TAKE ONE CAPSULE BY MOUTH EVERY DAY    Malignant neoplasm of prostate (H)       triamcinolone 0.1 % ointment    KENALOG     Apply topically 2 times daily Apply to Both lower extremities topically every day and evening shift        VISTARIL PO      Take 25 mg by mouth every 12 hours And 1 prn dose in 24 hrs         WARFARIN SODIUM PO      Take 3 mg by mouth

## 2017-10-17 NOTE — PROGRESS NOTES
[place] [facility]    HPI:    Suad Sloan is a 86 year old  (12/23/1930), who is being seen today for an episodic care visit at [facility].   HPI information obtained from: facility chart records. Today's concern is INR/Coumadin management for A. Fib    Bleeding Signs/Symptoms:  None  Thromboembolic Signs/Symptoms:  None    Medication Changes:  No  Dietary Changes:  No  Activity Changes: No  Bacterial/Viral Infection:  No    Missed Coumadin Doses:  None    On ASA: No    Other Concerns:  No    OBJECTIVE:    INR Today:  1.4  Current Dose:  3 mg po daily    ASSESSMENT:  Paroxysmal atrial fibrillation (H)  Only occurs when he is ill    Encounter for monitoring coumadin therapy  INR according to current and past results    Subtherapeutic INR for goal of 2-3    PLAN:    New Dose: 4 mg po daily      Next INR: 1 week      MARINA Alvarado CNP

## 2017-10-20 NOTE — PROGRESS NOTES
[place] [facility]    Chief Complaint   Patient presents with     Nursing Home Acute     UTI       HPI:    Suad Sloan is a 86 year old  (12/23/1930), who is being seen today for an episodic care visit at [facility].  HPI information obtained from: facility chart records.Today's concern is:     Dysuria  Upper respiratory tract infection, unspecified type  COPD, moderate (H)  CKD (chronic kidney disease) stage 3, GFR 30-59 ml/min     Alert, calm, NAD. Reports persistent burning with urination, states stopped for a couple of days and then started again. Denies abd pain, n/v, urgency, retention, fever or chills. States appetite is baseline. States also has noted cough x 2 weeks. Productive- yellow sputum. Denies SOB, chest pain. Denies sore throat. VS reviewed and stable and has been afebrile.     ALLERGIES: Ace inhibitors; Cleocin; Dulera; Erythromycin; Hydralazine; Imdur [isosorbide]; Methadone; Penicillins; and Spiriva handihaler  Past Medical, Surgical, Family and Social History reviewed and updated in [facility].    Current Outpatient Prescriptions   Medication Sig Dispense Refill     WARFARIN SODIUM PO Take 4 mg by mouth        HYDROmorphone HCl (DILAUDID PO) Take 2 mg by mouth every 3 hours       acetaminophen (TYLENOL) 325 MG tablet Take 325-650 mg by mouth every 4 hours as needed for mild pain       Potassium Chloride ER 20 MEQ TBCR Take 1 tablet (20 mEq) by mouth daily 90 tablet 1     furosemide (LASIX) 20 MG tablet Take 0.5 tablets (10 mg) by mouth daily 30 tablet prn     PREDNISONE PO Take 5 mg by mouth daily       HydrOXYzine Pamoate (VISTARIL PO) Take 25 mg by mouth every 12 hours And 1 prn dose in 24 hrs       sennosides (SENOKOT) 8.6 MG tablet Take 2 tablets by mouth every 12 hours And give 2 tablets by mouth daily PRN       METFORMIN HCL PO Take 250 mg by mouth 2 times daily (with meals)       Gabapentin (NEURONTIN PO) Take 200 mg by mouth every 12 hours Reported on  "3/27/2017       DULOXETINE HCL PO Take 60 mg by mouth At Bedtime       guaiFENesin (ROBITUSSIN) 100 MG/5ML SYRP Take 10 mLs by mouth every 4 hours as needed for cough       ferrous sulfate (IRON) 325 (65 FE) MG tablet Take 325 mg by mouth 2 times daily Give with orange juice       triamcinolone (KENALOG) 0.1 % ointment Apply topically 2 times daily Apply to Both lower extremities topically every day and evening shift       ammonium lactate (LAC-HYDRIN) 12 % lotion Apply topically 2 times daily       omeprazole (PRILOSEC) 40 MG capsule Take 1 capsule (40 mg) by mouth daily Take 30-60 minutes before a meal. 90 capsule 3     carvedilol (COREG) 12.5 MG tablet Take 6.25 mg by mouth 2 times daily (with meals)       albuterol (PROAIR HFA, PROVENTIL HFA, VENTOLIN HFA) 108 (90 BASE) MCG/ACT inhaler Inhale 2 puffs into the lungs 2 times daily as needed for shortness of breath / dyspnea or wheezing       tamsulosin (FLOMAX) 0.4 MG 24 hr capsule TAKE ONE CAPSULE BY MOUTH EVERY DAY 90 capsule 3     Medications reviewed:  Medications reconciled to facility chart and changes were made to reflect current medications as identified as above med list. Below are the changes that were made:   Medications stopped since last EPIC medication reconciliation:   There are no discontinued medications.    Medications started since last [facility] medication reconciliation:  No orders of the defined types were placed in this encounter.        REVIEW OF SYSTEMS:  4 point ROS including Respiratory, CV, GI and , other than that noted in the HPI,  is negative    Physical Exam:  /58  Pulse 60  Temp 97.9  F (36.6  C)  Resp 18  Ht 5' 9\" (1.753 m)  Wt 161 lb 6.4 oz (73.2 kg)  SpO2 97%  BMI 23.83 kg/m2       Resp: Effort WNL, LS decreased bilateral bases with fine crackles noted right base  CV: VS as above  Abd- soft, nontender, BS +  - bladder WNL  Musc- MELENDEZ  Psych- alert, calm, pleasant      BP 07/09-10/08: 104-140/54-78 mmHg    Recent Labs: " "   CBC RESULTS:   Recent Labs   Lab Test  10/18/17   0640  09/22/17   0600   WBC  9.3  7.0   RBC  3.93*  3.54*   HGB  11.0*  10.0*   HCT  34.6*  31.7*   MCV  88  90   MCH  28.0  28.2   MCHC  31.8  31.5   RDW  14.1  14.0   PLT  211  183       Last Basic Metabolic Panel:  Recent Labs   Lab Test  10/18/17   0640  09/22/17   0600   NA  139  140   POTASSIUM  4.7  4.0   CHLORIDE  105  105   REGAN  8.8  8.6   CO2  27  27   BUN  36*  29   CR  1.48*  1.34*   GLC  95  105*       Lab Results   Component Value Date    A1C 7.8 10/18/2017    A1C 7.0 04/19/2017       Assessment/Plan:  Dysuria  - _ UC +, CBC as above  Culture & Susceptibility      PROTEUS MIRABILIS      Antibiotic Interpretation Sensitivity Unit Method Status     AMPICILLIN Intermediate 16 ug/mL JP Preliminary     AMPICILLIN/SULBACTAM Sensitive 4 ug/mL JP Preliminary     CEFAZOLIN Sensitive <=4 ug/mL JP Preliminary     Comment: Cefazolin JP breakpoints are for the treatment of uncomplicated urinary tract   infections.  For the treatment of systemic infections, please contact the   laboratory for additional testing.     CEFEPIME Sensitive <=1 ug/mL JP Preliminary     CEFOXITIN Sensitive 8 ug/mL JP Preliminary     CEFTAZIDIME Sensitive <=1 ug/mL JP Preliminary     CEFTRIAXONE Sensitive <=1 ug/mL JP Preliminary     CIPROFLOXACIN Resistant >=4 ug/mL JP Preliminary     GENTAMICIN Sensitive <=1 ug/mL JP Preliminary     LEVOFLOXACIN Resistant >=8 ug/mL JP Preliminary     NITROFURANTOIN Resistant 128 ug/mL JP Preliminary     Piperacillin/Tazo Sensitive 8 ug/mL JP Preliminary     TOBRAMYCIN Sensitive <=1 ug/mL JP Preliminary     Trimethoprim/Sulfa Resistant >=16/304 ug/mL JP Preliminary                 - patient with \"nausea\" with pcn but report \"only when taking for a long time\"  - add augmentin 875 x 7 days- observe for allergy s/s  - follow VS/clinically        Upper respiratory tract infection, unspecified type  - cough x 2 weeks, right side crackles- CBC " as above  - add Augmentin, check CXR  - add albuterol nebx x 7 days, schedule guiafenesin x 7 days  - follow VS/clinically    COPD, moderate (H)  - noted,   - pulmonary toilet and continue on chronic meds/tx as ordered    CKD (chronic kidney disease) stage 3, GFR 30-59 ml/min  *- renal fx reviewed- GFR 45- dosed Augmentin accordingy          Electronically signed by  MARINA Jean-Baptiste CNP

## 2017-10-24 NOTE — PROGRESS NOTES
[place] [facility]    HPI:    Suad Sloan is a 86 year old  (12/23/1930), who is being seen today for an episodic care visit at [facility].   HPI information obtained from: facility chart records. Today's concern is INR/Coumadin management for GERARDO. Fib    Bleeding Signs/Symptoms:  None  Thromboembolic Signs/Symptoms:  None    Medication Changes:  Yes  Dietary Changes:  No  Activity Changes: No  Bacterial/Viral Infection:  Yes    Missed Coumadin Doses:  None    On ASA: No    Other Concerns:  No    OBJECTIVE:    INR Today:  2.1  Current Dose:  4 mg po daily    ASSESSMENT:  Encounter for monitoring coumadin therapy  INR according to current and past results    Paroxysmal atrial fibrillation (H)  Occ irreg AP    Therapeutic INR for goal of 2-3    PLAN:    New Dose: No Change      Next INR: 1 week> he is finishing abx      MARINA Alvarado CNP

## 2017-10-30 NOTE — PROGRESS NOTES
[place] [facility]    Chief Complaint   Patient presents with     Nursing Home Acute     Derm Problem       HPI:    Suad Sloan is a 86 year old  (12/23/1930), who is being seen today for an episodic care visit at [facility].  HPI information obtained from: facility chart records.Today's concern is:  Candidiasis of skin  - new moist erythematic rash noted to bilateral groin and scrotum area- patient denies pain, but reports some itching to area    Urinary tract infection without hematuria, site unspecified  - > 100,000 proteus mirabilis - completed Augmentin 10/27  - denies noting any further dysuria. Afebrile    Cough  COPD  - cough is loose, productive, CXR last week negative for acute pathology. Afebrile and without associated s/s  -       ALLERGIES: Ace inhibitors; Cleocin; Dulera; Erythromycin; Hydralazine; Imdur [isosorbide]; Methadone; Penicillins; and Spiriva handihaler  Past Medical, Surgical, Family and Social History reviewed and updated in [facility].    Current Outpatient Prescriptions   Medication Sig Dispense Refill     WARFARIN SODIUM PO Take 4 mg by mouth        HYDROmorphone HCl (DILAUDID PO) Take 2 mg by mouth every 3 hours       acetaminophen (TYLENOL) 325 MG tablet Take 325-650 mg by mouth every 4 hours as needed for mild pain       Potassium Chloride ER 20 MEQ TBCR Take 1 tablet (20 mEq) by mouth daily 90 tablet 1     furosemide (LASIX) 20 MG tablet Take 0.5 tablets (10 mg) by mouth daily 30 tablet prn     PREDNISONE PO Take 5 mg by mouth daily       HydrOXYzine Pamoate (VISTARIL PO) Take 25 mg by mouth every 12 hours And 1 prn dose in 24 hrs       sennosides (SENOKOT) 8.6 MG tablet Take 2 tablets by mouth every 12 hours And give 2 tablets by mouth daily PRN       METFORMIN HCL PO Take 250 mg by mouth 2 times daily (with meals)       Gabapentin (NEURONTIN PO) Take 200 mg by mouth every 12 hours Reported on 3/27/2017       DULOXETINE HCL PO Take 60 mg by mouth  "At Bedtime       guaiFENesin (ROBITUSSIN) 100 MG/5ML SYRP Take 10 mLs by mouth every 4 hours as needed for cough       ferrous sulfate (IRON) 325 (65 FE) MG tablet Take 325 mg by mouth 2 times daily Give with orange juice       triamcinolone (KENALOG) 0.1 % ointment Apply topically 2 times daily Apply to Both lower extremities topically every day and evening shift       ammonium lactate (LAC-HYDRIN) 12 % lotion Apply topically 2 times daily       omeprazole (PRILOSEC) 40 MG capsule Take 1 capsule (40 mg) by mouth daily Take 30-60 minutes before a meal. 90 capsule 3     carvedilol (COREG) 12.5 MG tablet Take 6.25 mg by mouth 2 times daily (with meals)       albuterol (PROAIR HFA, PROVENTIL HFA, VENTOLIN HFA) 108 (90 BASE) MCG/ACT inhaler Inhale 2 puffs into the lungs 2 times daily as needed for shortness of breath / dyspnea or wheezing       tamsulosin (FLOMAX) 0.4 MG 24 hr capsule TAKE ONE CAPSULE BY MOUTH EVERY DAY 90 capsule 3     Medications reviewed:  Medications reconciled to facility chart and changes were made to reflect current medications as identified as above med list. Below are the changes that were made:   Medications stopped since last EPIC medication reconciliation:   There are no discontinued medications.    Medications started since last [facility] medication reconciliation:  No orders of the defined types were placed in this encounter.        REVIEW OF SYSTEMS:  4 point ROS including Respiratory, CV, GI and , other than that noted in the HPI,  is negative    Physical Exam:  /57  Pulse 62  Temp 97.6  F (36.4  C)  Resp 18  Ht 5' 9\" (1.753 m)  Wt 161 lb 6.4 oz (73.2 kg)  SpO2 96%  BMI 23.83 kg/m2       Resp: Effort WNL- loose congested cough  CV: VSS as above  Abd- soft, nontender, BS +  Musc- MELENDEZ  Psych- alert, calm, pleasant       BP 08/13-10/22: 107-140/57-78 mmHg    Recent Labs:    CBC RESULTS:   Recent Labs   Lab Test  10/18/17   0640  09/22/17   0600   WBC  9.3  7.0   RBC  3.93*  3.54* "   HGB  11.0*  10.0*   HCT  34.6*  31.7*   MCV  88  90   MCH  28.0  28.2   MCHC  31.8  31.5   RDW  14.1  14.0   PLT  211  183       Last Basic Metabolic Panel:  Recent Labs   Lab Test  10/18/17   0640  09/22/17   0600   NA  139  140   POTASSIUM  4.7  4.0   CHLORIDE  105  105   REGAN  8.8  8.6   CO2  27  27   BUN  36*  29   CR  1.48*  1.34*   GLC  95  105*       Lab Results   Component Value Date    A1C 7.8 10/18/2017    A1C 7.0 04/19/2017       Assessment/Plan:  Candidiasis of skin  - add clotrimazole cream q 12 hours until rash resolved    Urinary tract infection without hematuria, site unspecified  - completed abx course  - resolved    Cough  COPD, moderate (H)  - continue prn guaifenesin and albuterol, and pulmonary toilet  - follow clinically          Electronically signed by  MARINA Jean-Baptiste CNP

## 2017-10-31 NOTE — PROGRESS NOTES
[place] [facility]    HPI:    Suad Sloan is a 86 year old  (12/23/1930), who is being seen today for an episodic care visit at [facility].   HPI information obtained from: facility chart records. Today's concern is INR/Coumadin management for A. Fib    Bleeding Signs/Symptoms:  None  Thromboembolic Signs/Symptoms:  None    Medication Changes:  Yes  Dietary Changes:  No  Activity Changes: No  Bacterial/Viral Infection:  Yes    Missed Coumadin Doses:  None    On ASA: No    Other Concerns:  No    OBJECTIVE:    INR Today:  2.2  Current Dose:  4mg    ASSESSMENT:     Encounter for monitoring coumadin therapy  Long-term (current) use of anticoagulants  Paroxysmal atrial fibrillation (H)     Therapeutic INR for goal of 2-3    PLAN:    New Dose: No Change      Next INR: 1 week        MARINA Jean-Baptiste CNP

## 2017-11-07 NOTE — PROGRESS NOTES
"[place] [facility]    Chief Complaint   Patient presents with     Nursing Home Acute     Shortness of Breath       HPI:    Suad Sloan is a 86 year old  (12/23/1930), who is being seen today for an episodic care visit at [facility].  HPI information obtained from: facility chart records.Today's concern is:  SOB (shortness of breath)     Patient reports noting increased SOB over w/e and states \"I think I have a sinus infection\"- VSS, afebrile. Currently does not appear SOB. Recently completed 7 day course of Augmentin for ? URI and for UTI. Reports cough and lung congestion improved and states the \"mask medication\" helps with breathing.     ALLERGIES: Ace inhibitors; Cleocin; Dulera; Erythromycin; Hydralazine; Imdur [isosorbide]; Methadone; Penicillins; and Spiriva handihaler  Past Medical, Surgical, Family and Social History reviewed and updated in [facility].    Current Outpatient Prescriptions   Medication Sig Dispense Refill     albuterol (2.5 MG/3ML) 0.083% neb solution Take 1 vial by nebulization 2 times daily AND Q4H PRN       sodium chloride (OCEAN) 0.65 % nasal spray Spray 1 spray into both nostrils 4 times daily       clotrimazole (LOTRIMIN) 1 % cream Apply topically every 12 hours ; apply to scrotal/groint until rash is resolved       senna-docusate (SENOKOT-S;PERICOLACE) 8.6-50 MG per tablet Take 1 tablet by mouth daily       WARFARIN SODIUM PO Take 4 mg by mouth        HYDROmorphone HCl (DILAUDID PO) Take 2 mg by mouth every 3 hours       acetaminophen (TYLENOL) 325 MG tablet Take 325-650 mg by mouth every 4 hours as needed for mild pain       Potassium Chloride ER 20 MEQ TBCR Take 1 tablet (20 mEq) by mouth daily 90 tablet 1     furosemide (LASIX) 20 MG tablet Take 0.5 tablets (10 mg) by mouth daily 30 tablet prn     PREDNISONE PO Take 5 mg by mouth daily       HydrOXYzine Pamoate (VISTARIL PO) Take 25 mg by mouth Give 25 mg by mouth two times a day for Anxiety, pain " Vistaril 25mg PO Q 12hrs scheduled 8 & 8 and 1 PRN dose in 24hrs. AND Give 25 mg by mouth every 24 hours as needed for Anxiety, Pain One PRN dose in 24hrs       sennosides (SENOKOT) 8.6 MG tablet Take 2 tablets by mouth 2 times daily as needed        METFORMIN HCL PO Take 250 mg by mouth 2 times daily (with meals)       Gabapentin (NEURONTIN PO) Take 200 mg by mouth every 12 hours Reported on 3/27/2017       DULOXETINE HCL PO Take 60 mg by mouth At Bedtime       guaiFENesin (ROBITUSSIN) 100 MG/5ML SYRP Take 10 mLs by mouth 3 times daily        ferrous sulfate (IRON) 325 (65 FE) MG tablet Take 325 mg by mouth 2 times daily Give with orange juice       triamcinolone (KENALOG) 0.1 % ointment Apply topically 2 times daily Apply to Both lower extremities topically every day and evening shift       ammonium lactate (LAC-HYDRIN) 12 % lotion Apply topically 2 times daily       omeprazole (PRILOSEC) 40 MG capsule Take 1 capsule (40 mg) by mouth daily Take 30-60 minutes before a meal. 90 capsule 3     carvedilol (COREG) 12.5 MG tablet Take 6.25 mg by mouth 2 times daily (with meals)       albuterol (PROAIR HFA, PROVENTIL HFA, VENTOLIN HFA) 108 (90 BASE) MCG/ACT inhaler Inhale 2 puffs into the lungs 2 times daily as needed for shortness of breath / dyspnea or wheezing       tamsulosin (FLOMAX) 0.4 MG 24 hr capsule TAKE ONE CAPSULE BY MOUTH EVERY DAY 90 capsule 3     Medications reviewed:  Medications reconciled to facility chart and changes were made to reflect current medications as identified as above med list. Below are the changes that were made:   Medications stopped since last EPIC medication reconciliation:   There are no discontinued medications.    Medications started since last [facility] medication reconciliation:  No orders of the defined types were placed in this encounter.        REVIEW OF SYSTEMS:  4 point ROS including Respiratory, CV, GI and , other than that noted in the HPI,  is negative    Physical Exam:  BP  "117/52  Pulse 61  Temp 98.5  F (36.9  C)  Resp 16  Ht 5' 9\" (1.753 m)  Wt 152 lb (68.9 kg)  SpO2 95%  BMI 22.45 kg/m2   ENT: Nasal congestion noted  Resp: Effort WNL, LS decreased in bases  CV: VSS as above  Abd- soft, nontender, BS +  Musc- MELENDEZ  Psych- alert, calm, pleasant       BP 08/13-11/5: 107-140/52-78 mmHg    Recent Labs:   CBC RESULTS:   Recent Labs   Lab Test  10/18/17   0640  09/22/17   0600   WBC  9.3  7.0   RBC  3.93*  3.54*   HGB  11.0*  10.0*   HCT  34.6*  31.7*   MCV  88  90   MCH  28.0  28.2   MCHC  31.8  31.5   RDW  14.1  14.0   PLT  211  183       Last Basic Metabolic Panel:  Recent Labs   Lab Test  10/18/17   0640  09/22/17   0600   NA  139  140   POTASSIUM  4.7  4.0   CHLORIDE  105  105   REGAN  8.8  8.6   CO2  27  27   BUN  36*  29   CR  1.48*  1.34*   GLC  95  105*       Lab Results   Component Value Date    A1C 7.8 10/18/2017    A1C 7.0 04/19/2017       Assessment/Plan:  (R06.02) SOB (shortness of breath)  (primary encounter diagnosis)  Comment: likely migrating/persistent respiratory virus vs infection, tx with Augmenting x 7 days- afebrile and CXR negative for acute pathology  Plan: Add sched albuterol nebs and Saline NS, follow clinically  - wife updated by phone by writer            Electronically signed by  Lizeth Ray, APRN CNP                  "

## 2017-11-07 NOTE — PROGRESS NOTES
[place] [facility]    HPI:    Suad Sloan is a 86 year old  (12/23/1930), who is being seen today for an episodic care visit at [facility].   HPI information obtained from: facility chart records. Today's concern is INR/Coumadin management for A. Fib    Bleeding Signs/Symptoms:  None  Thromboembolic Signs/Symptoms:  None    Medication Changes:  No  Dietary Changes:  No  Activity Changes: No  Bacterial/Viral Infection:  No    Missed Coumadin Doses:  None    On ASA: No    Other Concerns:  No    OBJECTIVE:    INR Today:  2.2  Current Dose:  4mg    ASSESSMENT:     Encounter for therapeutic drug monitoring  Long-term (current) use of anticoagulants  Paroxysmal atrial fibrillation (H)  Therapeutic INR for goal of 2-3    PLAN:    New Dose: No Change      Next INR: 2 weeks        MARINA Jean-Baptiste CNP

## 2017-11-14 NOTE — PROGRESS NOTES
[place] [facility]    Chief Complaint   Patient presents with     Nursing Home Acute     Arthritis       HPI:    Suad Sloan is a 86 year old  (12/23/1930), who is being seen today for an episodic care visit at [facility].  HPI information obtained from: facility chart records.Today's concern is:     Gout  CKD (chronic kidney disease) stage 3, GFR 30-59 ml/min - baseline Cr   - 2nd MCP inflamed- swollen and red. No warmth noted. Patient reports 'feeling better today. Started on prednisone bump yesterday.     ALLERGIES: Ace inhibitors; Cleocin; Dulera; Erythromycin; Hydralazine; Imdur [isosorbide]; Methadone; Penicillins; and Spiriva handihaler  Past Medical, Surgical, Family and Social History reviewed and updated in [facility].    Current Outpatient Prescriptions   Medication Sig Dispense Refill     PREDNISONE PO Take 30 mg by mouth daily for 3 days       PREDNISONE PO Take 5 mg by mouth daily       PREDNISONE PO Take 20 mg by mouth daily for 3 days       PREDNISONE PO Take 10 mg by mouth daily for 3 days       albuterol (2.5 MG/3ML) 0.083% neb solution Take 1 vial by nebulization 2 times daily AND Q4H PRN       clotrimazole (LOTRIMIN) 1 % cream Apply topically every 12 hours ; apply to scrotal/groint until rash is resolved       senna-docusate (SENOKOT-S;PERICOLACE) 8.6-50 MG per tablet Take 1 tablet by mouth daily       WARFARIN SODIUM PO Take 4 mg by mouth        HYDROmorphone HCl (DILAUDID PO) Take 2 mg by mouth every 3 hours       acetaminophen (TYLENOL) 325 MG tablet Take 325-650 mg by mouth every 4 hours as needed for mild pain       Potassium Chloride ER 20 MEQ TBCR Take 1 tablet (20 mEq) by mouth daily 90 tablet 1     furosemide (LASIX) 20 MG tablet Take 0.5 tablets (10 mg) by mouth daily 30 tablet prn     HydrOXYzine Pamoate (VISTARIL PO) Take 25 mg by mouth Give 25 mg by mouth two times a day for Anxiety, pain Vistaril 25mg PO Q 12hrs scheduled 8 & 8 and 1 PRN dose in  24hrs. AND Give 25 mg by mouth every 24 hours as needed for Anxiety, Pain One PRN dose in 24hrs       sennosides (SENOKOT) 8.6 MG tablet Take 2 tablets by mouth 2 times daily as needed        METFORMIN HCL PO Take 250 mg by mouth 2 times daily (with meals)       Gabapentin (NEURONTIN PO) Take 200 mg by mouth every 12 hours Reported on 3/27/2017       DULOXETINE HCL PO Take 60 mg by mouth At Bedtime       ferrous sulfate (IRON) 325 (65 FE) MG tablet Take 325 mg by mouth 2 times daily Give with orange juice       triamcinolone (KENALOG) 0.1 % ointment Apply topically 2 times daily Apply to Both lower extremities topically every day and evening shift       ammonium lactate (LAC-HYDRIN) 12 % lotion Apply topically 2 times daily       omeprazole (PRILOSEC) 40 MG capsule Take 1 capsule (40 mg) by mouth daily Take 30-60 minutes before a meal. 90 capsule 3     carvedilol (COREG) 12.5 MG tablet Take 6.25 mg by mouth 2 times daily (with meals)       albuterol (PROAIR HFA, PROVENTIL HFA, VENTOLIN HFA) 108 (90 BASE) MCG/ACT inhaler Inhale 2 puffs into the lungs 2 times daily as needed for shortness of breath / dyspnea or wheezing       tamsulosin (FLOMAX) 0.4 MG 24 hr capsule TAKE ONE CAPSULE BY MOUTH EVERY DAY 90 capsule 3     Medications reviewed:  Medications reconciled to facility chart and changes were made to reflect current medications as identified as above med list. Below are the changes that were made:   Medications stopped since last EPIC medication reconciliation:   Medications Discontinued During This Encounter   Medication Reason     PREDNISONE PO Discontinued by another Health Care Provider     guaiFENesin (ROBITUSSIN) 100 MG/5ML SYRP Discontinued by another Health Care Provider     sodium chloride (OCEAN) 0.65 % nasal spray Discontinued by another Health Care Provider       Medications started since last [facility] medication reconciliation:  Orders Placed This Encounter   Medications     PREDNISONE PO     Sig: Take  "30 mg by mouth daily for 3 days     PREDNISONE PO     Sig: Take 5 mg by mouth daily     PREDNISONE PO     Sig: Take 20 mg by mouth daily for 3 days     PREDNISONE PO     Sig: Take 10 mg by mouth daily for 3 days         REVIEW OF SYSTEMS:  4 point ROS including Respiratory, CV, GI and , other than that noted in the HPI,  is negative    Physical Exam:  /73  Pulse 64  Temp 98.6  F (37  C)  Resp 16  Ht 5' 9\" (1.753 m)  Wt 152 lb 12.8 oz (69.3 kg)  SpO2 95%  BMI 22.56 kg/m2    Resp: Effort WNL,  CV: VSS  Abd- soft, nontender, BS +  Musc- MELENDEZ- joint hand as above  Psych- alert, calm, pleasant        BP 10/08-11/13: 107-137/52-73 mmHg  Weights: 09/03-11/12: 152-162.8 lbs    Recent Labs:   CBC RESULTS:   Recent Labs   Lab Test  10/18/17   0640  09/22/17   0600   WBC  9.3  7.0   RBC  3.93*  3.54*   HGB  11.0*  10.0*   HCT  34.6*  31.7*   MCV  88  90   MCH  28.0  28.2   MCHC  31.8  31.5   RDW  14.1  14.0   PLT  211  183       Last Basic Metabolic Panel:  Recent Labs   Lab Test  10/18/17   0640  09/22/17   0600   NA  139  140   POTASSIUM  4.7  4.0   CHLORIDE  105  105   REGAN  8.8  8.6   CO2  27  27   BUN  36*  29   CR  1.48*  1.34*   GLC  95  105*       Lab Results   Component Value Date    A1C 7.8 10/18/2017    A1C 7.0 04/19/2017       Assessment/Plan:  Gout  - continue prednisone taper, prn pain meds and follow clinically for resolution  -consider addition of allopurinol following acute episode    CKD stage 3  - stable renal function  - periodic BMP          Electronically signed by  MARINA Jean-Baptiste CNP                  "

## 2017-11-21 NOTE — PROGRESS NOTES
[place] [facility]    HPI:    Suad Sloan is a 86 year old  (12/23/1930), who is being seen today for an episodic care visit at [facility].   HPI information obtained from: facility chart records. Today's concern is INR/Coumadin management for A. Fib    Bleeding Signs/Symptoms:  None  Thromboembolic Signs/Symptoms:  None    Medication Changes:  Yes- prednisone bump/taper ending tomorrow then will be on usual dose 5 mg daily  Dietary Changes:  No  Activity Changes: No  Bacterial/Viral Infection:  No    Missed Coumadin Doses:  None    On ASA: No    Other Concerns:  No    OBJECTIVE:    INR Today:  2.3  Current Dose:  4mg daily    ASSESSMENT:     Encounter for therapeutic drug monitoring  Long-term (current) use of anticoagulants  Paroxysmal atrial fibrillation (H)     Therapeutic INR for goal of 2-3    PLAN:    New Dose: No Change      Next INR: 2 weeks        MARINA Jean-Baptiste CNP

## 2017-11-22 NOTE — PROGRESS NOTES
[place] [facility]    Chief Complaint   Patient presents with     Nursing Home Acute     Dysuria       HPI:    Suad Sloan is a 86 year old  (12/23/1930), who is being seen today for an episodic care visit at [facility].  HPI information obtained from: facility chart records.  Today's concern is:  Dysuria  Suad reported to nursing that he has dysuria    Personal history of urinary tract infection  He has had recent UTI's      ALLERGIES: Ace inhibitors; Cleocin; Dulera; Erythromycin; Hydralazine; Imdur [isosorbide]; Methadone; Penicillins; and Spiriva handihaler  Past Medical, Surgical, Family and Social History reviewed and updated in EPIC.    Current Outpatient Prescriptions   Medication Sig Dispense Refill     PREDNISONE PO Take 5 mg by mouth daily       albuterol (2.5 MG/3ML) 0.083% neb solution Take 1 vial by nebulization 2 times daily AND Q4H PRN       clotrimazole (LOTRIMIN) 1 % cream Apply topically every 12 hours ; apply to scrotal/groint until rash is resolved       senna-docusate (SENOKOT-S;PERICOLACE) 8.6-50 MG per tablet Take 1 tablet by mouth daily       WARFARIN SODIUM PO Take 4 mg by mouth        HYDROmorphone HCl (DILAUDID PO) Take 2 mg by mouth every 3 hours       acetaminophen (TYLENOL) 325 MG tablet Take 325-650 mg by mouth every 4 hours as needed for mild pain       Potassium Chloride ER 20 MEQ TBCR Take 1 tablet (20 mEq) by mouth daily 90 tablet 1     furosemide (LASIX) 20 MG tablet Take 0.5 tablets (10 mg) by mouth daily 30 tablet prn     HydrOXYzine Pamoate (VISTARIL PO) Take 25 mg by mouth Give 25 mg by mouth two times a day for Anxiety, pain Vistaril 25mg PO Q 12hrs scheduled 8 & 8 and 1 PRN dose in 24hrs. AND Give 25 mg by mouth every 24 hours as needed for Anxiety, Pain One PRN dose in 24hrs       sennosides (SENOKOT) 8.6 MG tablet Take 2 tablets by mouth 2 times daily as needed        METFORMIN HCL PO Take 250 mg by mouth 2 times daily (with  "meals)       Gabapentin (NEURONTIN PO) Take 200 mg by mouth every 12 hours Reported on 3/27/2017       DULOXETINE HCL PO Take 60 mg by mouth At Bedtime       ferrous sulfate (IRON) 325 (65 FE) MG tablet Take 325 mg by mouth 2 times daily Give with orange juice       triamcinolone (KENALOG) 0.1 % ointment Apply topically 2 times daily Apply to Both lower extremities topically every day and evening shift       ammonium lactate (LAC-HYDRIN) 12 % lotion Apply topically 2 times daily       omeprazole (PRILOSEC) 40 MG capsule Take 1 capsule (40 mg) by mouth daily Take 30-60 minutes before a meal. 90 capsule 3     carvedilol (COREG) 12.5 MG tablet Take 6.25 mg by mouth 2 times daily (with meals)       albuterol (PROAIR HFA, PROVENTIL HFA, VENTOLIN HFA) 108 (90 BASE) MCG/ACT inhaler Inhale 2 puffs into the lungs 2 times daily as needed for shortness of breath / dyspnea or wheezing       tamsulosin (FLOMAX) 0.4 MG 24 hr capsule TAKE ONE CAPSULE BY MOUTH EVERY DAY 90 capsule 3     Medications reviewed:  Medications reconciled to facility chart and changes were made to reflect current medications as identified as above med list. Below are the changes that were made:   Medications stopped since last EPIC medication reconciliation:   There are no discontinued medications.    Medications started since last [facility] medication reconciliation:  No orders of the defined types were placed in this encounter.      REVIEW OF SYSTEMS:  4 point ROS including Respiratory, CV, GI and , other than that noted in the HPI,  is negative    Physical Exam:  /76  Pulse 68  Temp 98.1  F (36.7  C)  Resp 16  Ht 5' 9\" (1.753 m)  Wt 152 lb 12.8 oz (69.3 kg)  SpO2 98%  BMI 22.56 kg/m2  GENERAL APPEARANCE:  Alert, in no distress, cooperative  ABDOMEN:  normal bowel sounds, soft, nontender, no hepatosplenomegaly or other masses  :    Examination of the penis is normal, skin on penis and scrotum examined because the staff RN thought that he " might have erythema  PSYCH:  oriented X 3, affect and mood normal    BP 11/05-11/19: 117-142/52-76 mmHg    Recent Labs:    CBC RESULTS:   Recent Labs   Lab Test  10/18/17   0640  09/22/17   0600   WBC  9.3  7.0   RBC  3.93*  3.54*   HGB  11.0*  10.0*   HCT  34.6*  31.7*   MCV  88  90   MCH  28.0  28.2   MCHC  31.8  31.5   RDW  14.1  14.0   PLT  211  183       Last Basic Metabolic Panel:  Recent Labs   Lab Test  10/18/17   0640  09/22/17   0600   NA  139  140   POTASSIUM  4.7  4.0   CHLORIDE  105  105   REGAN  8.8  8.6   CO2  27  27   BUN  36*  29   CR  1.48*  1.34*   GLC  95  105*       Lab Results   Component Value Date    A1C 7.8 10/18/2017    A1C 7.0 04/19/2017       Assessment/Plan:  Dysuria  I did give an order for a UA C&S if indicated  I was informed that he does drink plenty of fluids      Personal history of urinary tract infection  If he has another UTI  I will order an renal ultra sound to r/o stones      Orders:  See A&P    Time> 20 min    Electronically signed by  MARINA Alvarado CNP

## 2017-11-25 NOTE — MR AVS SNAPSHOT
After Visit Summary   11/25/2017    Suad Sloan    MRN: [id number]           Patient Information     Date Of Birth          12/23/1930        Visit Information        Provider Department      11/25/2017 4:30 PM [facility]ON[facility] TECH1 [facility]        Today's Diagnoses     Cardiac pacemaker in situ    -  1       Follow-ups after your visit        Who to contact     If you have questions or need follow up information about today's clinic visit or your schedule please contact [facility] directly at [phone].  Normal or non-critical lab and imaging results will be communicated to you by [employer]visor[employer], letter or phone within 4 business days after the clinic has received the results. If you do not hear from us within 7 days, please contact the clinic through [employer] or phone. If you have a critical or abnormal lab result, we will notify you by phone as soon as possible.  Submit refill requests through [employer] or call your pharmacy and they will forward the refill request to us. Please allow 3 business days for your refill to be completed.          Additional Information About Your Visit        MyChart Information     [employer] gives you secure access to your electronic health record. If you see a primary care provider, you can also send messages to your care team and make appointments. If you have questions, please call your primary care clinic.  If you do not have a primary care provider, please call [phone] and they will assist you.        Care EveryWhere ID     This is your Care EveryWhere ID. This could be used by other organizations to access your [facility] medical records  [id number]         Blood Pressure from Last 3 Encounters:   11/22/17 142/76   11/21/17 142/76   11/14/17 132/73    Weight from Last 3 Encounters:   11/22/17 69.3 kg (152 lb 12.8 oz)   11/21/17 69.3 kg (152 lb 12.8 oz)   11/14/17 69.3 kg (152 lb 12.8 oz)               We Performed the Following     INTERROGATION DEVICE EVAL REMOTE, PACER/ICD (59098)     PM DEVICE INTERROGATE REMOTE (52532)        Primary Care Provider Office Phone # Fax #    MARINA Alvarado [phone]       [address]  [address] [address]        Equal Access to Services     [facility]: Hadii aad ku hadasho Soomaali, waaxda luqadaha, qaybta kaalmada adeegyada, waxay idiin hayaan adeeg kharash la'aan . So [facility] [phone].    ATENCIÓN: Si habla español, tiene a lino disposición servicios gratuitos de asistencia lingüística. Llame al [phone].    We comply with applicable federal civil rights laws and [place] laws. We do not discriminate on the basis of race, color, national origin, age, disability, sex, sexual orientation, or gender identity.            Thank you!     Thank you for choosing [facility]  for your care. Our goal is always to provide you with excellent care. Hearing back from our patients is one way we can continue to improve our services. Please take a few minutes to complete the written survey that you may receive in the mail after your visit with us. Thank you!             Your Updated Medication List - Protect others around you: Learn how to safely use, store and throw away your medicines at www.disposemymeds.org.          This list is accurate as of: 11/25/17 11:59 PM.  Always use your most recent med list.                   Brand Name Dispense Instructions for use Diagnosis    acetaminophen 325 MG tablet    TYLENOL     Take 325-650 mg by mouth every 4 hours as needed for mild pain        * albuterol 108 (90 BASE) MCG/ACT Inhaler    PROAIR HFA/PROVENTIL HFA/VENTOLIN HFA     Inhale 2 puffs into the lungs 2 times daily as needed for shortness of breath / dyspnea or wheezing        * albuterol (2.5 MG/3ML) 0.083% neb solution      Take 1 vial by nebulization 2 times daily AND Q4H PRN        ammonium  lactate 12 % lotion    LAC-HYDRIN     Apply topically 2 times daily        carvedilol 12.5 MG tablet    COREG     Take 6.25 mg by mouth 2 times daily (with meals)        clotrimazole 1 % cream    LOTRIMIN     Apply topically every 12 hours ; apply to scrotal/groint until rash is resolved        DILAUDID PO      Take 2 mg by mouth every 3 hours        DULOXETINE HCL PO      Take 60 mg by mouth At Bedtime        ferrous sulfate 325 (65 FE) MG tablet    IRON     Take 325 mg by mouth 2 times daily Give with orange juice        furosemide 20 MG tablet    LASIX    30 tablet    Take 0.5 tablets (10 mg) by mouth daily    Diastolic congestive heart failure, unspecified congestive heart failure chronicity (H)       METFORMIN HCL PO      Take 250 mg by mouth 2 times daily (with meals)        NEURONTIN PO      Take 200 mg by mouth every 12 hours Reported on 3/27/2017        omeprazole 40 MG capsule    priLOSEC    90 capsule    Take 1 capsule (40 mg) by mouth daily Take 30-60 minutes before a meal.    Acute upper gastrointestinal hemorrhage       Potassium Chloride ER 20 MEQ Tbcr     90 tablet    Take 1 tablet (20 mEq) by mouth daily    Hypokalemia       PREDNISONE PO      Take 5 mg by mouth daily        senna-docusate 8.6-50 MG per tablet    SENOKOT-S;PERICOLACE     Take 1 tablet by mouth daily        sennosides 8.6 MG tablet    SENOKOT     Take 2 tablets by mouth 2 times daily as needed        tamsulosin 0.4 MG capsule    FLOMAX    90 capsule    TAKE ONE CAPSULE BY MOUTH EVERY DAY    Malignant neoplasm of prostate (H)       triamcinolone 0.1 % ointment    KENALOG     Apply topically 2 times daily Apply to Both lower extremities topically every day and evening shift        VISTARIL PO      Take 25 mg by mouth Give 25 mg by mouth two times a day for Anxiety, pain Vistaril 25mg PO Q 12hrs scheduled 8 & 8 and 1 PRN dose in 24hrs. AND Give 25 mg by mouth every 24 hours as needed for Anxiety, Pain One PRN dose in 24hrs         WARFARIN SODIUM PO      Take 4 mg by mouth        * Notice:  This list has 2 medication(s) that are the same as other medications prescribed for you. Read the directions carefully, and ask your doctor or other care provider to review them with you.

## 2017-11-27 NOTE — PROGRESS NOTES
Medtronic Sensia SESR01 (S) Remote PPM Device Check  : 95%  Mode: VVIR        Presenting Rhythm: VVIR  Heart Rate: adequate heart rates per histogram  Sensing: not performed    Pacing Threshold: stable    Impedance: stable  Battery Status: 2.5 - 5 years remaining  Atrial Arrhythmia: chronic Afib  Ventricular Arrhythmia: 4 vent high rates. Marker only EGMs showing irregular VS events lasting 2 - 6 seconds, rates 170 -210bpm. EF 25% (2016). DNR/DNI.       Care Plan: Pt now on hospice care but family would like to continue with [facility] checks.  Mailed letter with results and next transmission date. Андрей LIRIANO

## 2017-12-04 NOTE — PROGRESS NOTES
[facility]    Chief Complaint   Patient presents with     Nursing Home Acute     Shoulder Pain       HPI:    Suad Sloan is a 86 year old  (12/23/1930), who is being seen today for an episodic care visit at [facility].  HPI information obtained from: facility chart records.  Today's concern is:nursing informed me that Suad has R shoulder pain.  I did order a R shoulder series ~ 8:30 am  Right shoulder pain, unspecified chronicity  Has no idea how it started  Stated that he has pain in the posterior aspect of the shoulder    Chronic pain syndrome  He is on multiple meds for the chronic pain    History of acute gouty arthritis  Is on a low dose of prednisone 5 mg po daily> he asked for an increase in  prednisone      ALLERGIES: Ace inhibitors; Cleocin; Dulera; Erythromycin; Hydralazine; Imdur [isosorbide]; Methadone; Penicillins; and Spiriva handihaler  Past Medical, Surgical, Family and Social History reviewed and updated in [facility].    Current Outpatient Prescriptions   Medication Sig Dispense Refill     PREDNISONE PO Take 5 mg by mouth daily       albuterol (2.5 MG/3ML) 0.083% neb solution Take 1 vial by nebulization 2 times daily AND Q4H PRN       clotrimazole (LOTRIMIN) 1 % cream Apply topically every 12 hours ; apply to scrotal/groint until rash is resolved       senna-docusate (SENOKOT-S;PERICOLACE) 8.6-50 MG per tablet Take 1 tablet by mouth daily       WARFARIN SODIUM PO Take 4 mg by mouth        HYDROmorphone HCl (DILAUDID PO) Take 2 mg by mouth every 3 hours       acetaminophen (TYLENOL) 325 MG tablet Take 325-650 mg by mouth every 4 hours as needed for mild pain       Potassium Chloride ER 20 MEQ TBCR Take 1 tablet (20 mEq) by mouth daily 90 tablet 1     furosemide (LASIX) 20 MG tablet Take 0.5 tablets (10 mg) by mouth daily 30 tablet prn     HydrOXYzine Pamoate (VISTARIL PO) Take 25 mg by mouth Give 25 mg by mouth two times a day for Anxiety, pain Vistaril  "25mg PO Q 12hrs scheduled 8 & 8 and 1 PRN dose in 24hrs. AND Give 25 mg by mouth every 24 hours as needed for Anxiety, Pain One PRN dose in 24hrs       sennosides (SENOKOT) 8.6 MG tablet Take 2 tablets by mouth 2 times daily as needed        METFORMIN HCL PO Take 250 mg by mouth 2 times daily (with meals)       Gabapentin (NEURONTIN PO) Take 200 mg by mouth every 12 hours Reported on 3/27/2017       DULOXETINE HCL PO Take 60 mg by mouth At Bedtime       ferrous sulfate (IRON) 325 (65 FE) MG tablet Take 325 mg by mouth 2 times daily Give with orange juice       triamcinolone (KENALOG) 0.1 % ointment Apply topically 2 times daily Apply to Both lower extremities topically every day and evening shift       ammonium lactate (LAC-HYDRIN) 12 % lotion Apply topically 2 times daily       omeprazole (PRILOSEC) 40 MG capsule Take 1 capsule (40 mg) by mouth daily Take 30-60 minutes before a meal. 90 capsule 3     carvedilol (COREG) 12.5 MG tablet Take 6.25 mg by mouth 2 times daily (with meals)       albuterol (PROAIR HFA, PROVENTIL HFA, VENTOLIN HFA) 108 (90 BASE) MCG/ACT inhaler Inhale 2 puffs into the lungs 2 times daily as needed for shortness of breath / dyspnea or wheezing       tamsulosin (FLOMAX) 0.4 MG 24 hr capsule TAKE ONE CAPSULE BY MOUTH EVERY DAY 90 capsule 3     Medications reviewed:  Medications reconciled to facility chart and changes were made to reflect current medications as identified as above med list. Below are the changes that were made:   Medications stopped since last EPIC medication reconciliation:   There are no discontinued medications.    Medications started since last [facility] medication reconciliation:  No orders of the defined types were placed in this encounter.      REVIEW OF SYSTEMS:  4 point ROS including Respiratory, CV, GI and , other than that noted in the HPI,  is negative    Physical Exam:  /78  Pulse 63  Temp 97.6  F (36.4  C)  Resp 18  Ht 5' 9\" (1.753 m)  Wt 152 lb 12.8 oz " (69.3 kg)  SpO2 94%  BMI 22.56 kg/m2  GENERAL APPEARANCE:  Alert, thin, cooperative  M/S:   Gait and station abnormal > he lifted his R UE laterally and did not complain about pain  The joint did not have any swelling or effusion  The skin was normal color  He pain with touch posterior aspect near the scapula  NEURO:   Cranial nerves 2-12 are normal tested and grossly at patient's baseline  PSYCH:  oriented X 3, insight and judgement impaired, memory impaired , affect and mood normal     BP 11/05-12/03: 117-144/ 52-78 mmHg    Recent Labs:    CBC RESULTS:   Recent Labs   Lab Test  10/18/17   0640  09/22/17   0600   WBC  9.3  7.0   RBC  3.93*  3.54*   HGB  11.0*  10.0*   HCT  34.6*  31.7*   MCV  88  90   MCH  28.0  28.2   MCHC  31.8  31.5   RDW  14.1  14.0   PLT  211  183       Last Basic Metabolic Panel:  Recent Labs   Lab Test  10/18/17   0640  09/22/17   0600   NA  139  140   POTASSIUM  4.7  4.0   CHLORIDE  105  105   REGAN  8.8  8.6   CO2  27  27   BUN  36*  29   CR  1.48*  1.34*   GLC  95  105*       Lab Results   Component Value Date    A1C 7.8 10/18/2017    A1C 7.0 04/19/2017       Assessment/Plan:  Right shoulder pain, unspecified chronicity  I did order a R shoulder series  Was not done until 3:45 pm   I did not observe any gross abnormalities  His wife was present and stated that Suad liked and ice pack and Biofreeze to the shoulder> he found that helpful   I did write orders for both ice pack and Biofreeze q 2 hrs prn  I told Suad that I did not want to increase the Prednisone at this time    I did ask Daquan Rockwell to review the xray's    Chronic pain syndrome  He is on multiple meds including Dilaudid 2 mg po q 3 hrs around the clock    History of acute gouty arthritis  On a low dose of Prednisone 5 mg daily      Orders:  See A&P    Total time spent with patient visit at the [facility] nursing facility was 30  min including patient visit, review of past records and in person conversation with his wife. Greater  than 50% of total time spent with counseling and coordinating care due to he has complex care needs    Electronically signed by  MARINA Alvarado CNP

## 2017-12-05 NOTE — PROGRESS NOTES
Ortho Nursing home visit    Suad Sloan is a 86 year old male who resides at [facility]    Patient is seen today for Right shoulder pain:      Past Medical History:   Diagnosis Date     AAA (abdominal aortic aneurysm) (H) 9/05    AAA, Lt Iliac - Dr Garcia     Alcohol dependence (H)     quit 2003     Anxiety      Arthritis of hands     hands/feet - dr varela     Atrial fibrillation (H)     on coumadin -  Dr Huddleston     Bladder cancer (H) 5/06    dr rodriguez- cyst removed and bx was benign     Cardiomyopathy (H)     EF 35-40% on 12/2014 Echo     Chronic pain      Chronic systolic congestive heart failure (H)     EF 35-40% on 12/2014 Echo     CKD (chronic kidney disease) stage 3, GFR 30-59 ml/min      Closed fracture of neck of left femur (H) 9/26/2016     Closed fracture of neck of left femur (H) 9/26/2016     Colon cancer (H) 9/05    Grade IIA adenoca -Stage 2 T3N0N0 - Dr Felder, Dr Hampton tubular adenoma, 2 cm colon ca mass     COPD, moderate (H)      Coronary atherosclerosis 1995    Dr Huddleston     Depression      Diverticulosis of colon (without mention of hemorrhage)      GI bleeding 3/24/2016     Hyperlipidemia      Hypertension     resolved     Hypoxia 8/26/2014     Iliac artery aneurysm, left (H)      Iron deficiency anemia 7/05     Left leg cellulitis 7/4/2016     Malignant neoplasm of prostate (H) 1994    s/p radiation     Peripheral neuropathy      Protein deficiency (H), Albumin2.4  7- 7/25/2016     Pulmonary hypertension      Respiratory failure (H) 12/5/2014     Restless leg syndrome      Sleep apnea 6/10    moderate - auto ASV - dr goltz/darvin     Sleep apnea     moderate - BiPAP 10/5 - with chenye ruffin breathing      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    PPM     Type 2 diabetes mellitus (H) 2005     Vertigo       Past Surgical History:   Procedure Laterality Date     AAA repair / Umbilical hernia repair  6/06    dr garcia      APPENDECTOMY OPEN  1970's     C FABRIC WRAPPING OF ABDOMINAL  ANEURYSM       CATARACT IOL, RT/LT Right 1/09    dr kurtz     CATARACT IOL, RT/LT Left 2/09    dr kurtz     CHOLECYSTECTOMY  1997     COLON SURGERY  9/05    colon ca, dr lyon,      COLONOSCOPY  9/05, 4/07, 2/11    tubular adenoma - due 3 yrs     ENDOVASCULAR REPAIR ANEURYSM ABDOMINAL AORTA N/A 12/16/2015    ENDOVASCULAR REPAIR ANEURYSM ABDOMINAL AORTA;  Surgeon: Timmy Abdalla MD;  Location: [facility] OR     ESOPHAGOSCOPY, GASTROSCOPY, DUODENOSCOPY (EGD), COMBINED N/A 3/28/2016    Esophagitis, small erosions, inflammtory nodule     IMPLANT PACEMAKER  09-16-11     lysis of adhesions, repair of incisional hernias  2/10        Allergies   Allergen Reactions     Ace Inhibitors      hyperkalemia     Cleocin      Severe Heartburn     Dulera      Leg cramps, gas, mouth sores     Erythromycin      upset stomach     Hydralazine      Throat swelling     Imdur [Isosorbide]      Stomach upset     Methadone Other (See Comments)     Became very confused and too sedated     Penicillins Nausea     Spiriva Handihaler      Mouth sores, leg cramps      There were no vitals taken for this visit.     Exam: IN bed today, 2nd to hip fracture; patient is painful in all PROM today, pain at AC joint, unable to raise arm above shoulder height with AROM , exam, AROM 30 FE/ 40 Abduction/ 30 IR/ 40 ER. 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    [id number]          [facility]  [phone]

## 2017-12-11 NOTE — PROGRESS NOTES
"  [facility]    Chief Complaint   Patient presents with     FDC Regulatory       HPI:    Suad Sloan is a 86 year old  (12/23/1930), who is being seen today for a federally mandated E/M visit at [facility].  HPI information obtained from: facility chart records. Today's concerns are:  Osteoarthritis of multiple joints, unspecified osteoarthritis type  - recent pain/exac right shoulder, patient reports improved following 12/5 steroid injection per Daquan QUIROGA    - Anxiety disorder, unspecified type  - chronic- wax/wane- reports exac as sometimes worries at night about finances and living in [place]. States prn hydroxyzine given last night was helpful  - continue Cymbalta, hydroxyzine.     Chronic pain syndrome  - chronic- currently stable/controlled  - continues on longstanding regimen with q 3 hour dilaudid (and has persistently requested to be awakened for dosing) and Gabapentin, prn acetaminophen    Type 2 diabetes mellitus with stage 3 chronic kidney disease, without long-term current use of insulin (H)  Lab Results   Component Value Date    A1C 7.8 10/18/2017    A1C 7.0 04/19/2017    A1C 7.8 10/12/2016    A1C 7.5 07/05/2016    A1C 6.6 03/25/2016     - BGL 100s    Chronic systolic congestive heart failure (H)  - no active s/s- weights stable    COPD, moderate (H)  - denies wheezing or SOB _\" been good lately\"      ALLERGIES: Ace inhibitors; Cleocin; Dulera; Erythromycin; Hydralazine; Imdur [isosorbide]; Methadone; Penicillins; and Spiriva handihaler  PAST MEDICAL HISTORY:  has a past medical history of AAA (abdominal aortic aneurysm) (H) (9/05); Alcohol dependence (H); Anxiety; Arthritis of hands; Atrial fibrillation (H); Bladder cancer (H) (5/06); Cardiomyopathy (H); Chronic pain; Chronic systolic congestive heart failure (H); CKD (chronic kidney disease) stage 3, GFR 30-59 ml/min; Closed fracture of neck of left femur (H) (9/26/2016); Closed fracture of neck " of left femur (H) (9/26/2016); Colon cancer (H) (9/05); COPD, moderate (H); Coronary atherosclerosis (1995); Depression; Diverticulosis of colon (without mention of hemorrhage); GI bleeding (3/24/2016); Hyperlipidemia; Hypertension; Hypoxia (8/26/2014); Iliac artery aneurysm, left (H); Iron deficiency anemia (7/05); Left leg cellulitis (7/4/2016); Malignant neoplasm of prostate (H) (1994); Peripheral neuropathy; Protein deficiency (H), Albumin2.4  7- (7/25/2016); Pulmonary hypertension; Respiratory failure (H) (12/5/2014); Restless leg syndrome; Sleep apnea (6/10); Sleep apnea; 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  PAST SURGICAL HISTORY:  has a past surgical history that includes Cholecystectomy (1997); Appendectomy open (1970's); Colonoscopy (9/05, 4/07, 2/11); Colon surgery (9/05); AAA repair / Umbilical hernia repair (6/06); cataract iol, rt/lt (Right, 1/09); cataract iol, rt/lt (Left, 2/09); lysis of adhesions, repair of incisional hernias (2/10); Implant pacemaker (09-16-11); FABRIC WRAPPING OF ABDOMINAL ANEURYSM; Endovascular repair aneurysm abdominal aorta (N/A, 12/16/2015); and Esophagoscopy, gastroscopy, duodenoscopy (EGD), combined (N/A, 3/28/2016).  FAMILY HISTORY: family history includes CANCER in his brother and sister; DIABETES in his paternal grandmother; HEART DISEASE (age of onset: 70) in his father; HEART DISEASE (age of onset: 80) in his mother.  SOCIAL HISTORY:  reports that he quit smoking about 7 years ago. His smoking use included Cigarettes. He has a 50.00 pack-year smoking history. He has never used smokeless tobacco. He reports that he does not drink alcohol or use illicit drugs.    MEDICATIONS:  Current Outpatient Prescriptions   Medication Sig Dispense Refill     PREDNISONE PO Take 5 mg by mouth daily       albuterol (2.5 MG/3ML) 0.083% neb solution Take 1 vial by nebulization 2 times daily AND Q4H PRN       clotrimazole (LOTRIMIN) 1 %  cream Apply topically every 12 hours ; apply to scrotal/groint until rash is resolved       senna-docusate (SENOKOT-S;PERICOLACE) 8.6-50 MG per tablet Take 1 tablet by mouth daily       WARFARIN SODIUM PO Take 4 mg by mouth        HYDROmorphone HCl (DILAUDID PO) Take 2 mg by mouth every 3 hours       acetaminophen (TYLENOL) 325 MG tablet Take 325-650 mg by mouth every 4 hours as needed for mild pain       Potassium Chloride ER 20 MEQ TBCR Take 1 tablet (20 mEq) by mouth daily 90 tablet 1     furosemide (LASIX) 20 MG tablet Take 0.5 tablets (10 mg) by mouth daily 30 tablet prn     HydrOXYzine Pamoate (VISTARIL PO) Take 25 mg by mouth Give 25 mg by mouth two times a day for Anxiety, pain Vistaril 25mg PO Q 12hrs scheduled 8 & 8 and 1 PRN dose in 24hrs. AND Give 25 mg by mouth every 24 hours as needed for Anxiety, Pain One PRN dose in 24hrs       sennosides (SENOKOT) 8.6 MG tablet Take 2 tablets by mouth 2 times daily as needed        METFORMIN HCL PO Take 250 mg by mouth 2 times daily (with meals)       Gabapentin (NEURONTIN PO) Take 200 mg by mouth every 12 hours Reported on 3/27/2017       DULOXETINE HCL PO Take 60 mg by mouth At Bedtime       ferrous sulfate (IRON) 325 (65 FE) MG tablet Take 325 mg by mouth 2 times daily Give with orange juice       triamcinolone (KENALOG) 0.1 % ointment Apply topically 2 times daily Apply to Both lower extremities topically every day and evening shift       ammonium lactate (LAC-HYDRIN) 12 % lotion Apply topically 2 times daily       omeprazole (PRILOSEC) 40 MG capsule Take 1 capsule (40 mg) by mouth daily Take 30-60 minutes before a meal. 90 capsule 3     carvedilol (COREG) 12.5 MG tablet Take 6.25 mg by mouth 2 times daily (with meals)       albuterol (PROAIR HFA, PROVENTIL HFA, VENTOLIN HFA) 108 (90 BASE) MCG/ACT inhaler Inhale 2 puffs into the lungs 2 times daily as needed for shortness of breath / dyspnea or wheezing       tamsulosin (FLOMAX) 0.4 MG 24 hr capsule TAKE ONE  "CAPSULE BY MOUTH EVERY DAY 90 capsule 3     Medications reviewed:  Medications reconciled to facility chart and changes were made to reflect current medications as identified as above med list. Below are the changes that were made:   Medications stopped since last EPIC medication reconciliation:   There are no discontinued medications.    Medications started since last [facility] medication reconciliation:  No orders of the defined types were placed in this encounter.        Case Management:  I have reviewed the care plan and MDS and do agree with the plan. Patient's desire to return to the community is present, but is not able due to care needs .  Information reviewed:  Medications, vital signs, orders, and nursing notes.    ROS:  4 point ROS including Respiratory, CV, GI and , other than that noted in the HPI,  is negative    Exam:  Vitals: /78  Pulse 63  Temp 97.6  F (36.4  C)  Resp 18  Ht 5' 9\" (1.753 m)  Wt 152 lb 12.8 oz (69.3 kg)  SpO2 97%  BMI 22.56 kg/m2  BMI= Body mass index is 22.56 kg/(m^2).     Resp: Effort WNL, LSCTA   CV: VS as above - no edema  Abd- soft, nontender, BS +  Musc- MELENDEZ  Skin- chronic skin changes/discoloration bilateral LE- no open areas, lesions or erythema noted  Psych- alert, calm, pleasant      BP 11/05-12/03: 117-144/52-78 mmHg  Weights: 09/10-11/12:  11/12: 152.8lbs  11/05: 152 lbs  10/08: 161.4lbs  09/10: 162.8lbs  09/03: 157 lbs      Lab/Diagnostic data:    CBC RESULTS:   Recent Labs   Lab Test  10/18/17   0640  09/22/17   0600   WBC  9.3  7.0   RBC  3.93*  3.54*   HGB  11.0*  10.0*   HCT  34.6*  31.7*   MCV  88  90   MCH  28.0  28.2   MCHC  31.8  31.5   RDW  14.1  14.0   PLT  211  183       Last Basic Metabolic Panel:  Recent Labs   Lab Test  10/18/17   0640  09/22/17   0600   NA  139  140   POTASSIUM  4.7  4.0   CHLORIDE  105  105   REGAN  8.8  8.6   CO2  27  27   BUN  36*  29   CR  1.48*  1.34*   GLC  95  105*       Lab Results   Component Value Date    A1C 7.8 " 10/18/2017    A1C 7.0 04/19/2017       ASSESSMENT/PLAN  Osteoarthritis of multiple joints, unspecified osteoarthritis type  - shoulder improved after 12/5 cortisone injection per JUNAID Starks  - continue on current pain regimen, prn biofreeze    Anxiety disorder, unspecified type  - chronic- wax/wane  - continue on Cymbalta and hydroxyzine  - supportive approache    Chronic pain syndrome  - chronic, stable  - continues on low dose prednisone, q 3 hour dilaudid and Gabapentin  - and on bowel regimen with sched and prn Senna S    Type 2 diabetes mellitus with stage 3 chronic kidney disease, without long-term current use of insulin (H)  - controlled  - continue on metformin  - next A1c due 4/18    Chronic systolic congestive heart failure (H)  - stable/compensated  - continue on lasix 20 mg daily, KCL  - q 6 month BMP/CBC    COPD, moderate (H)  - continue on scheduled and prn albuterol        Electronically signed by:  MARINA Jean-Baptiste CNP

## 2018-01-01 ENCOUNTER — NURSING HOME VISIT (OUTPATIENT)
Dept: GERIATRICS | Facility: CLINIC | Age: 83
End: 2018-01-01
Payer: MEDICARE

## 2018-01-01 ENCOUNTER — NURSING HOME VISIT (OUTPATIENT)
Dept: GERIATRICS | Facility: CLINIC | Age: 83
End: 2018-01-01
Payer: COMMERCIAL

## 2018-01-01 ENCOUNTER — ALLIED HEALTH/NURSE VISIT (OUTPATIENT)
Dept: CARDIOLOGY | Facility: CLINIC | Age: 83
End: 2018-01-01
Payer: MEDICARE

## 2018-01-01 ENCOUNTER — TELEPHONE (OUTPATIENT)
Dept: PHARMACY | Facility: CLINIC | Age: 83
End: 2018-01-01

## 2018-01-01 ENCOUNTER — HOSPITAL LABORATORY (OUTPATIENT)
Dept: OTHER | Facility: CLINIC | Age: 83
End: 2018-01-01

## 2018-01-01 ENCOUNTER — HOSPITAL ENCOUNTER (INPATIENT)
Facility: CLINIC | Age: 83
LOS: 2 days | Discharge: NURSING, REHAB OR LONG-TERM CARE | DRG: 193 | End: 2018-01-30
Attending: EMERGENCY MEDICINE | Admitting: INTERNAL MEDICINE
Payer: COMMERCIAL

## 2018-01-01 ENCOUNTER — DOCUMENTATION ONLY (OUTPATIENT)
Dept: CARE COORDINATION | Facility: CLINIC | Age: 83
End: 2018-01-01

## 2018-01-01 ENCOUNTER — TELEPHONE (OUTPATIENT)
Dept: GERIATRICS | Facility: CLINIC | Age: 83
End: 2018-01-01

## 2018-01-01 ENCOUNTER — APPOINTMENT (OUTPATIENT)
Dept: GENERAL RADIOLOGY | Facility: CLINIC | Age: 83
DRG: 193 | End: 2018-01-01
Attending: EMERGENCY MEDICINE
Payer: COMMERCIAL

## 2018-01-01 VITALS
SYSTOLIC BLOOD PRESSURE: 154 MMHG | HEIGHT: 69 IN | OXYGEN SATURATION: 95 % | RESPIRATION RATE: 16 BRPM | WEIGHT: 163.3 LBS | DIASTOLIC BLOOD PRESSURE: 82 MMHG | TEMPERATURE: 97.6 F | HEART RATE: 74 BPM | BODY MASS INDEX: 24.19 KG/M2

## 2018-01-01 VITALS
RESPIRATION RATE: 18 BRPM | TEMPERATURE: 97.7 F | BODY MASS INDEX: 22.96 KG/M2 | WEIGHT: 155 LBS | OXYGEN SATURATION: 93 % | HEART RATE: 65 BPM | DIASTOLIC BLOOD PRESSURE: 77 MMHG | SYSTOLIC BLOOD PRESSURE: 136 MMHG | HEIGHT: 69 IN

## 2018-01-01 VITALS
TEMPERATURE: 97.9 F | SYSTOLIC BLOOD PRESSURE: 128 MMHG | WEIGHT: 171.5 LBS | DIASTOLIC BLOOD PRESSURE: 70 MMHG | BODY MASS INDEX: 25.4 KG/M2 | OXYGEN SATURATION: 97 % | HEART RATE: 64 BPM | HEIGHT: 69 IN | RESPIRATION RATE: 18 BRPM

## 2018-01-01 VITALS
HEIGHT: 69 IN | BODY MASS INDEX: 26.53 KG/M2 | WEIGHT: 179.1 LBS | DIASTOLIC BLOOD PRESSURE: 88 MMHG | OXYGEN SATURATION: 94 % | SYSTOLIC BLOOD PRESSURE: 139 MMHG | RESPIRATION RATE: 16 BRPM | HEART RATE: 64 BPM | TEMPERATURE: 97.4 F

## 2018-01-01 VITALS
TEMPERATURE: 98.3 F | DIASTOLIC BLOOD PRESSURE: 75 MMHG | HEART RATE: 65 BPM | BODY MASS INDEX: 24.19 KG/M2 | WEIGHT: 163.3 LBS | SYSTOLIC BLOOD PRESSURE: 146 MMHG | OXYGEN SATURATION: 97 % | HEIGHT: 69 IN | RESPIRATION RATE: 17 BRPM

## 2018-01-01 VITALS
TEMPERATURE: 97.4 F | OXYGEN SATURATION: 95 % | BODY MASS INDEX: 22.96 KG/M2 | BODY MASS INDEX: 22.96 KG/M2 | SYSTOLIC BLOOD PRESSURE: 143 MMHG | WEIGHT: 155 LBS | HEIGHT: 69 IN | HEART RATE: 64 BPM | OXYGEN SATURATION: 92 % | RESPIRATION RATE: 16 BRPM | DIASTOLIC BLOOD PRESSURE: 73 MMHG | HEART RATE: 64 BPM | HEIGHT: 69 IN | WEIGHT: 155 LBS | SYSTOLIC BLOOD PRESSURE: 143 MMHG | DIASTOLIC BLOOD PRESSURE: 73 MMHG

## 2018-01-01 VITALS
WEIGHT: 163.3 LBS | RESPIRATION RATE: 16 BRPM | DIASTOLIC BLOOD PRESSURE: 82 MMHG | TEMPERATURE: 97.6 F | HEART RATE: 74 BPM | OXYGEN SATURATION: 100 % | SYSTOLIC BLOOD PRESSURE: 154 MMHG | HEIGHT: 69 IN | BODY MASS INDEX: 24.19 KG/M2

## 2018-01-01 VITALS
DIASTOLIC BLOOD PRESSURE: 79 MMHG | TEMPERATURE: 98 F | HEIGHT: 69 IN | OXYGEN SATURATION: 96 % | SYSTOLIC BLOOD PRESSURE: 139 MMHG | WEIGHT: 182.4 LBS | BODY MASS INDEX: 27.02 KG/M2 | HEART RATE: 60 BPM | RESPIRATION RATE: 18 BRPM

## 2018-01-01 VITALS
OXYGEN SATURATION: 92 % | WEIGHT: 170 LBS | SYSTOLIC BLOOD PRESSURE: 166 MMHG | BODY MASS INDEX: 25.18 KG/M2 | TEMPERATURE: 97.3 F | RESPIRATION RATE: 18 BRPM | HEIGHT: 69 IN | HEART RATE: 71 BPM | DIASTOLIC BLOOD PRESSURE: 87 MMHG

## 2018-01-01 VITALS
OXYGEN SATURATION: 93 % | WEIGHT: 170 LBS | HEIGHT: 69 IN | SYSTOLIC BLOOD PRESSURE: 166 MMHG | DIASTOLIC BLOOD PRESSURE: 87 MMHG | BODY MASS INDEX: 25.18 KG/M2 | RESPIRATION RATE: 18 BRPM | HEART RATE: 71 BPM | TEMPERATURE: 97.3 F

## 2018-01-01 VITALS
HEIGHT: 69 IN | TEMPERATURE: 97.2 F | WEIGHT: 155 LBS | DIASTOLIC BLOOD PRESSURE: 79 MMHG | OXYGEN SATURATION: 91 % | BODY MASS INDEX: 22.96 KG/M2 | RESPIRATION RATE: 16 BRPM | HEART RATE: 68 BPM | SYSTOLIC BLOOD PRESSURE: 145 MMHG

## 2018-01-01 VITALS
HEIGHT: 69 IN | DIASTOLIC BLOOD PRESSURE: 63 MMHG | WEIGHT: 178.5 LBS | BODY MASS INDEX: 26.44 KG/M2 | HEART RATE: 64 BPM | RESPIRATION RATE: 20 BRPM | TEMPERATURE: 98.1 F | OXYGEN SATURATION: 94 % | SYSTOLIC BLOOD PRESSURE: 148 MMHG

## 2018-01-01 VITALS
WEIGHT: 170 LBS | RESPIRATION RATE: 18 BRPM | SYSTOLIC BLOOD PRESSURE: 166 MMHG | HEART RATE: 71 BPM | OXYGEN SATURATION: 94 % | TEMPERATURE: 97.3 F | DIASTOLIC BLOOD PRESSURE: 87 MMHG | BODY MASS INDEX: 25.1 KG/M2

## 2018-01-01 VITALS
DIASTOLIC BLOOD PRESSURE: 88 MMHG | BODY MASS INDEX: 26.53 KG/M2 | RESPIRATION RATE: 16 BRPM | HEIGHT: 69 IN | HEART RATE: 64 BPM | OXYGEN SATURATION: 94 % | SYSTOLIC BLOOD PRESSURE: 139 MMHG | WEIGHT: 179.1 LBS | TEMPERATURE: 97.4 F

## 2018-01-01 VITALS — WEIGHT: 155 LBS | BODY MASS INDEX: 22.96 KG/M2 | HEIGHT: 69 IN | RESPIRATION RATE: 18 BRPM | OXYGEN SATURATION: 97 %

## 2018-01-01 VITALS
WEIGHT: 171.5 LBS | RESPIRATION RATE: 18 BRPM | SYSTOLIC BLOOD PRESSURE: 144 MMHG | TEMPERATURE: 97.3 F | DIASTOLIC BLOOD PRESSURE: 70 MMHG | OXYGEN SATURATION: 94 % | HEIGHT: 69 IN | BODY MASS INDEX: 25.4 KG/M2 | HEART RATE: 89 BPM

## 2018-01-01 VITALS
WEIGHT: 155 LBS | OXYGEN SATURATION: 93 % | BODY MASS INDEX: 22.96 KG/M2 | SYSTOLIC BLOOD PRESSURE: 146 MMHG | HEART RATE: 71 BPM | TEMPERATURE: 97.5 F | RESPIRATION RATE: 18 BRPM | HEIGHT: 69 IN | DIASTOLIC BLOOD PRESSURE: 74 MMHG

## 2018-01-01 VITALS
BODY MASS INDEX: 24.32 KG/M2 | HEART RATE: 90 BPM | WEIGHT: 164.7 LBS | DIASTOLIC BLOOD PRESSURE: 68 MMHG | TEMPERATURE: 96.9 F | OXYGEN SATURATION: 95 % | SYSTOLIC BLOOD PRESSURE: 145 MMHG | RESPIRATION RATE: 16 BRPM

## 2018-01-01 VITALS
DIASTOLIC BLOOD PRESSURE: 60 MMHG | SYSTOLIC BLOOD PRESSURE: 144 MMHG | WEIGHT: 171.5 LBS | HEIGHT: 69 IN | OXYGEN SATURATION: 99 % | RESPIRATION RATE: 18 BRPM | HEART RATE: 89 BPM | TEMPERATURE: 97.3 F | BODY MASS INDEX: 25.4 KG/M2

## 2018-01-01 DIAGNOSIS — Z51.81 ENCOUNTER FOR THERAPEUTIC DRUG MONITORING: Primary | ICD-10-CM

## 2018-01-01 DIAGNOSIS — I48.0 PAROXYSMAL ATRIAL FIBRILLATION (H): ICD-10-CM

## 2018-01-01 DIAGNOSIS — E11.22 TYPE 2 DIABETES MELLITUS WITH STAGE 3 CHRONIC KIDNEY DISEASE, WITHOUT LONG-TERM CURRENT USE OF INSULIN (H): ICD-10-CM

## 2018-01-01 DIAGNOSIS — L03.114 CELLULITIS OF LEFT UPPER EXTREMITY: Primary | ICD-10-CM

## 2018-01-01 DIAGNOSIS — G93.40 ENCEPHALOPATHY: ICD-10-CM

## 2018-01-01 DIAGNOSIS — I50.22 CHRONIC SYSTOLIC CONGESTIVE HEART FAILURE (H): Primary | ICD-10-CM

## 2018-01-01 DIAGNOSIS — N18.30 STAGE 3 CHRONIC KIDNEY DISEASE (H): ICD-10-CM

## 2018-01-01 DIAGNOSIS — R41.0 CONFUSION: Primary | ICD-10-CM

## 2018-01-01 DIAGNOSIS — Z51.89 VISIT FOR WOUND CHECK: Primary | ICD-10-CM

## 2018-01-01 DIAGNOSIS — R63.0 DECREASED APPETITE: ICD-10-CM

## 2018-01-01 DIAGNOSIS — J18.9 PNEUMONIA OF LEFT LUNG DUE TO INFECTIOUS ORGANISM, UNSPECIFIED PART OF LUNG: Primary | ICD-10-CM

## 2018-01-01 DIAGNOSIS — R11.0 NAUSEA: Primary | ICD-10-CM

## 2018-01-01 DIAGNOSIS — G89.4 CHRONIC PAIN SYNDROME: ICD-10-CM

## 2018-01-01 DIAGNOSIS — F02.81 ALZHEIMER'S DEMENTIA WITH BEHAVIORAL DISTURBANCE, UNSPECIFIED TIMING OF DEMENTIA ONSET: ICD-10-CM

## 2018-01-01 DIAGNOSIS — I10 HYPERTENSION GOAL BP (BLOOD PRESSURE) < 140/90: ICD-10-CM

## 2018-01-01 DIAGNOSIS — G89.29 OTHER CHRONIC PAIN: Primary | ICD-10-CM

## 2018-01-01 DIAGNOSIS — G62.9 PERIPHERAL POLYNEUROPATHY: ICD-10-CM

## 2018-01-01 DIAGNOSIS — G89.29 OTHER CHRONIC PAIN: ICD-10-CM

## 2018-01-01 DIAGNOSIS — Z51.5 HOSPICE CARE PATIENT: ICD-10-CM

## 2018-01-01 DIAGNOSIS — L03.114 CELLULITIS OF LEFT UPPER EXTREMITY: ICD-10-CM

## 2018-01-01 DIAGNOSIS — Z79.01 LONG-TERM (CURRENT) USE OF ANTICOAGULANTS: ICD-10-CM

## 2018-01-01 DIAGNOSIS — N18.30 CKD (CHRONIC KIDNEY DISEASE) STAGE 3, GFR 30-59 ML/MIN (H): ICD-10-CM

## 2018-01-01 DIAGNOSIS — J44.9 COPD, MODERATE (H): ICD-10-CM

## 2018-01-01 DIAGNOSIS — R40.0 SOMNOLENCE: ICD-10-CM

## 2018-01-01 DIAGNOSIS — M10.9 ACUTE GOUTY ARTHRITIS: Primary | ICD-10-CM

## 2018-01-01 DIAGNOSIS — J18.9 PNEUMONIA DUE TO INFECTIOUS ORGANISM, UNSPECIFIED LATERALITY, UNSPECIFIED PART OF LUNG: ICD-10-CM

## 2018-01-01 DIAGNOSIS — Z95.0 CARDIAC PACEMAKER IN SITU: Primary | ICD-10-CM

## 2018-01-01 DIAGNOSIS — R60.9 EDEMA, UNSPECIFIED TYPE: ICD-10-CM

## 2018-01-01 DIAGNOSIS — I50.22 CHRONIC SYSTOLIC CONGESTIVE HEART FAILURE (H): ICD-10-CM

## 2018-01-01 DIAGNOSIS — R09.02 HYPOXIA: ICD-10-CM

## 2018-01-01 DIAGNOSIS — M79.602 PAIN OF LEFT UPPER EXTREMITY: ICD-10-CM

## 2018-01-01 DIAGNOSIS — G62.9 NEUROPATHY: ICD-10-CM

## 2018-01-01 DIAGNOSIS — Z51.89 VISIT FOR WOUND CHECK: ICD-10-CM

## 2018-01-01 DIAGNOSIS — R47.81 SLURRED SPEECH: ICD-10-CM

## 2018-01-01 DIAGNOSIS — N17.9 ACUTE RENAL FAILURE, UNSPECIFIED ACUTE RENAL FAILURE TYPE (H): ICD-10-CM

## 2018-01-01 DIAGNOSIS — R60.1 GENERALIZED EDEMA: ICD-10-CM

## 2018-01-01 DIAGNOSIS — I48.20 CHRONIC ATRIAL FIBRILLATION (H): ICD-10-CM

## 2018-01-01 DIAGNOSIS — R44.3 HALLUCINATIONS: Primary | ICD-10-CM

## 2018-01-01 DIAGNOSIS — G93.41 ACUTE METABOLIC ENCEPHALOPATHY: ICD-10-CM

## 2018-01-01 DIAGNOSIS — S41.112D LACERATION OF LEFT UPPER EXTREMITY, SUBSEQUENT ENCOUNTER: ICD-10-CM

## 2018-01-01 DIAGNOSIS — K92.2 ACUTE UPPER GASTROINTESTINAL HEMORRHAGE: ICD-10-CM

## 2018-01-01 DIAGNOSIS — R45.1 AGITATION: ICD-10-CM

## 2018-01-01 DIAGNOSIS — R53.81 PHYSICAL DEBILITY: ICD-10-CM

## 2018-01-01 DIAGNOSIS — M19.90 INFLAMMATORY ARTHRITIS: Primary | ICD-10-CM

## 2018-01-01 DIAGNOSIS — E11.40 TYPE 2 DIABETES MELLITUS WITH DIABETIC NEUROPATHY, WITHOUT LONG-TERM CURRENT USE OF INSULIN (H): ICD-10-CM

## 2018-01-01 DIAGNOSIS — G30.9 ALZHEIMER'S DEMENTIA WITH BEHAVIORAL DISTURBANCE, UNSPECIFIED TIMING OF DEMENTIA ONSET: ICD-10-CM

## 2018-01-01 DIAGNOSIS — F32.1 MAJOR DEPRESSIVE DISORDER, SINGLE EPISODE, MODERATE (H): ICD-10-CM

## 2018-01-01 DIAGNOSIS — M79.89 SWELLING OF LEFT THUMB: Primary | ICD-10-CM

## 2018-01-01 DIAGNOSIS — J18.9 COMMUNITY ACQUIRED PNEUMONIA, UNSPECIFIED LATERALITY: Primary | ICD-10-CM

## 2018-01-01 DIAGNOSIS — Z85.038 HISTORY OF COLON CANCER, NO STAGING: ICD-10-CM

## 2018-01-01 DIAGNOSIS — R11.0 NAUSEA: ICD-10-CM

## 2018-01-01 DIAGNOSIS — N28.9 RENAL INSUFFICIENCY: ICD-10-CM

## 2018-01-01 DIAGNOSIS — J18.9 COMMUNITY ACQUIRED PNEUMONIA, UNSPECIFIED LATERALITY: ICD-10-CM

## 2018-01-01 DIAGNOSIS — R06.02 SHORTNESS OF BREATH: Primary | ICD-10-CM

## 2018-01-01 DIAGNOSIS — N18.30 TYPE 2 DIABETES MELLITUS WITH STAGE 3 CHRONIC KIDNEY DISEASE, WITHOUT LONG-TERM CURRENT USE OF INSULIN (H): ICD-10-CM

## 2018-01-01 DIAGNOSIS — C44.320 SCC (SQUAMOUS CELL CARCINOMA), FACE: ICD-10-CM

## 2018-01-01 DIAGNOSIS — R06.02 SHORTNESS OF BREATH: ICD-10-CM

## 2018-01-01 DIAGNOSIS — Z79.01 CHRONIC ANTICOAGULATION: ICD-10-CM

## 2018-01-01 DIAGNOSIS — M62.81 GENERALIZED MUSCLE WEAKNESS: Primary | ICD-10-CM

## 2018-01-01 DIAGNOSIS — S91.011A LACERATION OF RIGHT ANKLE, INITIAL ENCOUNTER: Primary | ICD-10-CM

## 2018-01-01 DIAGNOSIS — F43.23 ADJUSTMENT DISORDER WITH MIXED ANXIETY AND DEPRESSED MOOD: ICD-10-CM

## 2018-01-01 DIAGNOSIS — E11.42 TYPE 2 DIABETES MELLITUS WITH DIABETIC POLYNEUROPATHY, WITHOUT LONG-TERM CURRENT USE OF INSULIN (H): ICD-10-CM

## 2018-01-01 DIAGNOSIS — R41.82 ALTERED MENTAL STATUS, UNSPECIFIED ALTERED MENTAL STATUS TYPE: ICD-10-CM

## 2018-01-01 LAB
ALBUMIN SERPL-MCNC: 2.6 G/DL (ref 3.4–5)
ALBUMIN SERPL-MCNC: 3.1 G/DL (ref 3.4–5)
ALBUMIN UR-MCNC: 30 MG/DL
ALBUMIN UR-MCNC: 30 MG/DL
ALP SERPL-CCNC: 78 U/L (ref 40–150)
ALP SERPL-CCNC: 86 U/L (ref 40–150)
ALT SERPL W P-5'-P-CCNC: 10 U/L (ref 0–70)
ALT SERPL W P-5'-P-CCNC: 24 U/L (ref 0–70)
AMORPH CRY #/AREA URNS HPF: ABNORMAL /HPF
ANION GAP SERPL CALCULATED.3IONS-SCNC: 12 MMOL/L (ref 3–14)
ANION GAP SERPL CALCULATED.3IONS-SCNC: 4 MMOL/L (ref 3–14)
ANION GAP SERPL CALCULATED.3IONS-SCNC: 5 MMOL/L (ref 3–14)
ANION GAP SERPL CALCULATED.3IONS-SCNC: 6 MMOL/L (ref 3–14)
ANION GAP SERPL CALCULATED.3IONS-SCNC: 7 MMOL/L (ref 3–14)
ANION GAP SERPL CALCULATED.3IONS-SCNC: 7 MMOL/L (ref 3–14)
APPEARANCE UR: ABNORMAL
APPEARANCE UR: CLEAR
APTT PPP: 45 SEC (ref 22–37)
APTT PPP: NORMAL SEC (ref 22–37)
AST SERPL W P-5'-P-CCNC: 15 U/L (ref 0–45)
AST SERPL W P-5'-P-CCNC: 19 U/L (ref 0–45)
BACTERIA #/AREA URNS HPF: ABNORMAL /HPF
BACTERIA SPEC CULT: ABNORMAL
BACTERIA SPEC CULT: NO GROWTH
BACTERIA SPEC CULT: NORMAL
BASOPHILS # BLD AUTO: 0 10E9/L (ref 0–0.2)
BASOPHILS # BLD AUTO: 0 10E9/L (ref 0–0.2)
BASOPHILS NFR BLD AUTO: 0.3 %
BASOPHILS NFR BLD AUTO: 0.5 %
BILIRUB DIRECT SERPL-MCNC: 0.3 MG/DL (ref 0–0.2)
BILIRUB SERPL-MCNC: 0.6 MG/DL (ref 0.2–1.3)
BILIRUB SERPL-MCNC: 1 MG/DL (ref 0.2–1.3)
BILIRUB UR QL STRIP: NEGATIVE
BILIRUB UR QL STRIP: NEGATIVE
BUN SERPL-MCNC: 31 MG/DL (ref 7–30)
BUN SERPL-MCNC: 33 MG/DL (ref 7–30)
BUN SERPL-MCNC: 38 MG/DL (ref 7–30)
BUN SERPL-MCNC: 39 MG/DL (ref 7–30)
BUN SERPL-MCNC: 45 MG/DL (ref 7–30)
BUN SERPL-MCNC: 49 MG/DL (ref 7–30)
CALCIUM SERPL-MCNC: 8.2 MG/DL (ref 8.5–10.1)
CALCIUM SERPL-MCNC: 8.4 MG/DL (ref 8.5–10.1)
CALCIUM SERPL-MCNC: 8.5 MG/DL (ref 8.5–10.1)
CALCIUM SERPL-MCNC: 8.6 MG/DL (ref 8.5–10.1)
CALCIUM SERPL-MCNC: 8.7 MG/DL (ref 8.5–10.1)
CALCIUM SERPL-MCNC: 8.8 MG/DL (ref 8.5–10.1)
CHLORIDE SERPL-SCNC: 100 MMOL/L (ref 94–109)
CHLORIDE SERPL-SCNC: 105 MMOL/L (ref 94–109)
CHLORIDE SERPL-SCNC: 106 MMOL/L (ref 94–109)
CHLORIDE SERPL-SCNC: 106 MMOL/L (ref 94–109)
CHLORIDE SERPL-SCNC: 107 MMOL/L (ref 94–109)
CHLORIDE SERPL-SCNC: 110 MMOL/L (ref 94–109)
CO2 SERPL-SCNC: 20 MMOL/L (ref 20–32)
CO2 SERPL-SCNC: 27 MMOL/L (ref 20–32)
CO2 SERPL-SCNC: 27 MMOL/L (ref 20–32)
CO2 SERPL-SCNC: 28 MMOL/L (ref 20–32)
CO2 SERPL-SCNC: 29 MMOL/L (ref 20–32)
CO2 SERPL-SCNC: 29 MMOL/L (ref 20–32)
CO2 SERPL-SCNC: 30 MMOL/L (ref 20–32)
CO2 SERPL-SCNC: 30 MMOL/L (ref 20–32)
COLOR UR AUTO: YELLOW
COLOR UR AUTO: YELLOW
CREAT SERPL-MCNC: 1.21 MG/DL (ref 0.66–1.25)
CREAT SERPL-MCNC: 1.41 MG/DL (ref 0.66–1.25)
CREAT SERPL-MCNC: 1.51 MG/DL (ref 0.66–1.25)
CREAT SERPL-MCNC: 1.51 MG/DL (ref 0.66–1.25)
CREAT SERPL-MCNC: 1.68 MG/DL (ref 0.66–1.25)
CREAT SERPL-MCNC: 1.68 MG/DL (ref 0.66–1.25)
CREAT SERPL-MCNC: 1.74 MG/DL (ref 0.66–1.25)
CREAT SERPL-MCNC: 1.87 MG/DL (ref 0.66–1.25)
DIFFERENTIAL METHOD BLD: ABNORMAL
DIFFERENTIAL METHOD BLD: ABNORMAL
EOSINOPHIL # BLD AUTO: 0 10E9/L (ref 0–0.7)
EOSINOPHIL # BLD AUTO: 0.1 10E9/L (ref 0–0.7)
EOSINOPHIL NFR BLD AUTO: 0.3 %
EOSINOPHIL NFR BLD AUTO: 1.4 %
ERYTHROCYTE [DISTWIDTH] IN BLOOD BY AUTOMATED COUNT: 14.8 % (ref 10–15)
ERYTHROCYTE [DISTWIDTH] IN BLOOD BY AUTOMATED COUNT: 14.9 % (ref 10–15)
ERYTHROCYTE [DISTWIDTH] IN BLOOD BY AUTOMATED COUNT: 15 % (ref 10–15)
ERYTHROCYTE [DISTWIDTH] IN BLOOD BY AUTOMATED COUNT: 15.1 % (ref 10–15)
ERYTHROCYTE [DISTWIDTH] IN BLOOD BY AUTOMATED COUNT: 15.7 % (ref 10–15)
ERYTHROCYTE [DISTWIDTH] IN BLOOD BY AUTOMATED COUNT: 16.3 % (ref 10–15)
ERYTHROCYTE [DISTWIDTH] IN BLOOD BY AUTOMATED COUNT: 16.7 % (ref 10–15)
FLUAV+FLUBV AG SPEC QL: NEGATIVE
FLUAV+FLUBV AG SPEC QL: NEGATIVE
GFR SERPL CREATININE-BSD FRML MDRD: 34 ML/MIN/1.7M2
GFR SERPL CREATININE-BSD FRML MDRD: 37 ML/MIN/1.7M2
GFR SERPL CREATININE-BSD FRML MDRD: 39 ML/MIN/1.7M2
GFR SERPL CREATININE-BSD FRML MDRD: 39 ML/MIN/1.7M2
GFR SERPL CREATININE-BSD FRML MDRD: 44 ML/MIN/1.7M2
GFR SERPL CREATININE-BSD FRML MDRD: 44 ML/MIN/1.7M2
GFR SERPL CREATININE-BSD FRML MDRD: 48 ML/MIN/1.7M2
GFR SERPL CREATININE-BSD FRML MDRD: 57 ML/MIN/1.7M2
GLUCOSE BLDC GLUCOMTR-MCNC: 163 MG/DL (ref 70–99)
GLUCOSE BLDC GLUCOMTR-MCNC: 164 MG/DL (ref 70–99)
GLUCOSE BLDC GLUCOMTR-MCNC: 180 MG/DL (ref 70–99)
GLUCOSE BLDC GLUCOMTR-MCNC: 186 MG/DL (ref 70–99)
GLUCOSE BLDC GLUCOMTR-MCNC: 198 MG/DL (ref 70–99)
GLUCOSE BLDC GLUCOMTR-MCNC: 208 MG/DL (ref 70–99)
GLUCOSE BLDC GLUCOMTR-MCNC: 212 MG/DL (ref 70–99)
GLUCOSE BLDC GLUCOMTR-MCNC: 220 MG/DL (ref 70–99)
GLUCOSE BLDC GLUCOMTR-MCNC: 234 MG/DL (ref 70–99)
GLUCOSE BLDC GLUCOMTR-MCNC: 258 MG/DL (ref 70–99)
GLUCOSE SERPL-MCNC: 132 MG/DL (ref 70–99)
GLUCOSE SERPL-MCNC: 139 MG/DL (ref 70–99)
GLUCOSE SERPL-MCNC: 147 MG/DL (ref 70–99)
GLUCOSE SERPL-MCNC: 149 MG/DL (ref 70–99)
GLUCOSE SERPL-MCNC: 152 MG/DL (ref 70–99)
GLUCOSE SERPL-MCNC: 169 MG/DL (ref 70–99)
GLUCOSE SERPL-MCNC: 175 MG/DL (ref 70–99)
GLUCOSE SERPL-MCNC: 178 MG/DL (ref 70–99)
GLUCOSE UR STRIP-MCNC: NEGATIVE MG/DL
GLUCOSE UR STRIP-MCNC: NEGATIVE MG/DL
GRAM STN SPEC: ABNORMAL
HBA1C MFR BLD: 7.4 % (ref 4.3–6)
HBA1C MFR BLD: 8.7 % (ref 0–6.4)
HCT VFR BLD AUTO: 33.6 % (ref 40–53)
HCT VFR BLD AUTO: 34.1 % (ref 40–53)
HCT VFR BLD AUTO: 34.9 % (ref 40–53)
HCT VFR BLD AUTO: 35.2 % (ref 40–53)
HCT VFR BLD AUTO: 36.9 % (ref 40–53)
HCT VFR BLD AUTO: 37 % (ref 40–53)
HCT VFR BLD AUTO: 38.7 % (ref 40–53)
HGB BLD-MCNC: 10.2 G/DL (ref 13.3–17.7)
HGB BLD-MCNC: 10.2 G/DL (ref 13.3–17.7)
HGB BLD-MCNC: 10.3 G/DL (ref 13.3–17.7)
HGB BLD-MCNC: 11 G/DL (ref 13.3–17.7)
HGB BLD-MCNC: 11.1 G/DL (ref 13.3–17.7)
HGB BLD-MCNC: 11.5 G/DL (ref 13.3–17.7)
HGB BLD-MCNC: 11.7 G/DL (ref 13.3–17.7)
HGB UR QL STRIP: NEGATIVE
HGB UR QL STRIP: NEGATIVE
IMM GRANULOCYTES # BLD: 0 10E9/L (ref 0–0.4)
IMM GRANULOCYTES # BLD: 0.1 10E9/L (ref 0–0.4)
IMM GRANULOCYTES NFR BLD: 0.1 %
IMM GRANULOCYTES NFR BLD: 0.7 %
INR PPP: 2.82 (ref 0.86–1.14)
INR PPP: 2.93 (ref 0.86–1.14)
INR PPP: 3.4 (ref 0.86–1.14)
INR PPP: NORMAL (ref 0.86–1.14)
INTERPRETATION ECG - MUSE: NORMAL
KETONES UR STRIP-MCNC: NEGATIVE MG/DL
KETONES UR STRIP-MCNC: NEGATIVE MG/DL
LACTATE BLD-SCNC: 1.1 MMOL/L (ref 0.7–2)
LEUKOCYTE ESTERASE UR QL STRIP: NEGATIVE
LEUKOCYTE ESTERASE UR QL STRIP: NEGATIVE
LYMPHOCYTES # BLD AUTO: 0.6 10E9/L (ref 0.8–5.3)
LYMPHOCYTES # BLD AUTO: 0.7 10E9/L (ref 0.8–5.3)
LYMPHOCYTES NFR BLD AUTO: 7 %
LYMPHOCYTES NFR BLD AUTO: 9.9 %
Lab: ABNORMAL
Lab: NORMAL
MCH RBC QN AUTO: 28 PG (ref 26.5–33)
MCH RBC QN AUTO: 28.5 PG (ref 26.5–33)
MCH RBC QN AUTO: 28.7 PG (ref 26.5–33)
MCH RBC QN AUTO: 28.9 PG (ref 26.5–33)
MCH RBC QN AUTO: 29.1 PG (ref 26.5–33)
MCH RBC QN AUTO: 29.2 PG (ref 26.5–33)
MCH RBC QN AUTO: 29.3 PG (ref 26.5–33)
MCHC RBC AUTO-ENTMCNC: 29.5 G/DL (ref 31.5–36.5)
MCHC RBC AUTO-ENTMCNC: 29.9 G/DL (ref 31.5–36.5)
MCHC RBC AUTO-ENTMCNC: 30.1 G/DL (ref 31.5–36.5)
MCHC RBC AUTO-ENTMCNC: 30.2 G/DL (ref 31.5–36.5)
MCHC RBC AUTO-ENTMCNC: 30.4 G/DL (ref 31.5–36.5)
MCHC RBC AUTO-ENTMCNC: 31.1 G/DL (ref 31.5–36.5)
MCHC RBC AUTO-ENTMCNC: 31.3 G/DL (ref 31.5–36.5)
MCV RBC AUTO: 94 FL (ref 78–100)
MCV RBC AUTO: 95 FL (ref 78–100)
MCV RBC AUTO: 96 FL (ref 78–100)
MCV RBC AUTO: 96 FL (ref 78–100)
MCV RBC AUTO: 97 FL (ref 78–100)
MONOCYTES # BLD AUTO: 0.6 10E9/L (ref 0–1.3)
MONOCYTES # BLD AUTO: 0.8 10E9/L (ref 0–1.3)
MONOCYTES NFR BLD AUTO: 8.8 %
MONOCYTES NFR BLD AUTO: 9 %
MUCOUS THREADS #/AREA URNS LPF: PRESENT /LPF
MUCOUS THREADS #/AREA URNS LPF: PRESENT /LPF
NEUTROPHILS # BLD AUTO: 5.6 10E9/L (ref 1.6–8.3)
NEUTROPHILS # BLD AUTO: 7.1 10E9/L (ref 1.6–8.3)
NEUTROPHILS NFR BLD AUTO: 79.5 %
NEUTROPHILS NFR BLD AUTO: 82.5 %
NITRATE UR QL: NEGATIVE
NITRATE UR QL: NEGATIVE
NRBC # BLD AUTO: 0 10*3/UL
NRBC # BLD AUTO: 0.1 10*3/UL
NRBC BLD AUTO-RTO: 0 /100
NRBC BLD AUTO-RTO: 1 /100
PH UR STRIP: 5 PH (ref 5–7)
PH UR STRIP: 5.5 PH (ref 5–7)
PLATELET # BLD AUTO: 121 10E9/L (ref 150–450)
PLATELET # BLD AUTO: 124 10E9/L (ref 150–450)
PLATELET # BLD AUTO: 127 10E9/L (ref 150–450)
PLATELET # BLD AUTO: 149 10E9/L (ref 150–450)
PLATELET # BLD AUTO: 157 10E9/L (ref 150–450)
PLATELET # BLD AUTO: 161 10E9/L (ref 150–450)
PLATELET # BLD AUTO: 162 10E9/L (ref 150–450)
POTASSIUM SERPL-SCNC: 4 MMOL/L (ref 3.4–5.3)
POTASSIUM SERPL-SCNC: 4.3 MMOL/L (ref 3.4–5.3)
POTASSIUM SERPL-SCNC: 4.5 MMOL/L (ref 3.4–5.3)
POTASSIUM SERPL-SCNC: 4.7 MMOL/L (ref 3.4–5.3)
POTASSIUM SERPL-SCNC: 4.7 MMOL/L (ref 3.4–5.3)
POTASSIUM SERPL-SCNC: 4.9 MMOL/L (ref 3.4–5.3)
POTASSIUM SERPL-SCNC: 4.9 MMOL/L (ref 3.4–5.3)
POTASSIUM SERPL-SCNC: 5.2 MMOL/L (ref 3.4–5.3)
PROCALCITONIN SERPL-MCNC: <0.05 NG/ML
PROT SERPL-MCNC: 6.1 G/DL (ref 6.8–8.8)
PROT SERPL-MCNC: 6.7 G/DL (ref 6.8–8.8)
RBC # BLD AUTO: 3.5 10E12/L (ref 4.4–5.9)
RBC # BLD AUTO: 3.59 10E12/L (ref 4.4–5.9)
RBC # BLD AUTO: 3.64 10E12/L (ref 4.4–5.9)
RBC # BLD AUTO: 3.76 10E12/L (ref 4.4–5.9)
RBC # BLD AUTO: 3.89 10E12/L (ref 4.4–5.9)
RBC # BLD AUTO: 3.94 10E12/L (ref 4.4–5.9)
RBC # BLD AUTO: 4.05 10E12/L (ref 4.4–5.9)
RBC #/AREA URNS AUTO: 3 /HPF (ref 0–2)
RBC #/AREA URNS AUTO: 8 /HPF (ref 0–2)
SODIUM SERPL-SCNC: 136 MMOL/L (ref 133–144)
SODIUM SERPL-SCNC: 139 MMOL/L (ref 133–144)
SODIUM SERPL-SCNC: 140 MMOL/L (ref 133–144)
SODIUM SERPL-SCNC: 141 MMOL/L (ref 133–144)
SODIUM SERPL-SCNC: 141 MMOL/L (ref 133–144)
SODIUM SERPL-SCNC: 144 MMOL/L (ref 133–144)
SOURCE: ABNORMAL
SOURCE: ABNORMAL
SP GR UR STRIP: 1.01 (ref 1–1.03)
SP GR UR STRIP: 1.01 (ref 1–1.03)
SPECIMEN SOURCE: ABNORMAL
SPECIMEN SOURCE: ABNORMAL
SPECIMEN SOURCE: NORMAL
SQUAMOUS #/AREA URNS AUTO: <1 /HPF (ref 0–1)
UROBILINOGEN UR STRIP-MCNC: 4 MG/DL (ref 0–2)
UROBILINOGEN UR STRIP-MCNC: NORMAL MG/DL (ref 0–2)
WBC # BLD AUTO: 5.1 10E9/L (ref 4–11)
WBC # BLD AUTO: 5.5 10E9/L (ref 4–11)
WBC # BLD AUTO: 7 10E9/L (ref 4–11)
WBC # BLD AUTO: 7.8 10E9/L (ref 4–11)
WBC # BLD AUTO: 8.6 10E9/L (ref 4–11)
WBC # BLD AUTO: 8.6 10E9/L (ref 4–11)
WBC # BLD AUTO: 9.2 10E9/L (ref 4–11)
WBC #/AREA URNS AUTO: 0 /HPF (ref 0–2)
WBC #/AREA URNS AUTO: 0 /HPF (ref 0–2)

## 2018-01-01 PROCEDURE — 83036 HEMOGLOBIN GLYCOSYLATED A1C: CPT | Performed by: INTERNAL MEDICINE

## 2018-01-01 PROCEDURE — 25000125 ZZHC RX 250: Performed by: EMERGENCY MEDICINE

## 2018-01-01 PROCEDURE — 25000128 H RX IP 250 OP 636: Performed by: INTERNAL MEDICINE

## 2018-01-01 PROCEDURE — 00000146 ZZHCL STATISTIC GLUCOSE BY METER IP

## 2018-01-01 PROCEDURE — 87086 URINE CULTURE/COLONY COUNT: CPT | Performed by: EMERGENCY MEDICINE

## 2018-01-01 PROCEDURE — 81001 URINALYSIS AUTO W/SCOPE: CPT | Performed by: EMERGENCY MEDICINE

## 2018-01-01 PROCEDURE — 99309 SBSQ NF CARE MODERATE MDM 30: CPT | Mod: GV | Performed by: NURSE PRACTITIONER

## 2018-01-01 PROCEDURE — 87205 SMEAR GRAM STAIN: CPT | Performed by: INTERNAL MEDICINE

## 2018-01-01 PROCEDURE — 85730 THROMBOPLASTIN TIME PARTIAL: CPT | Performed by: EMERGENCY MEDICINE

## 2018-01-01 PROCEDURE — 36415 COLL VENOUS BLD VENIPUNCTURE: CPT | Performed by: INTERNAL MEDICINE

## 2018-01-01 PROCEDURE — 85027 COMPLETE CBC AUTOMATED: CPT | Performed by: INTERNAL MEDICINE

## 2018-01-01 PROCEDURE — 83605 ASSAY OF LACTIC ACID: CPT | Performed by: EMERGENCY MEDICINE

## 2018-01-01 PROCEDURE — 96365 THER/PROPH/DIAG IV INF INIT: CPT

## 2018-01-01 PROCEDURE — 40000275 ZZH STATISTIC RCP TIME EA 10 MIN

## 2018-01-01 PROCEDURE — 12000000 ZZH R&B MED SURG/OB

## 2018-01-01 PROCEDURE — 99308 SBSQ NF CARE LOW MDM 20: CPT | Performed by: NURSE PRACTITIONER

## 2018-01-01 PROCEDURE — 85610 PROTHROMBIN TIME: CPT | Performed by: INTERNAL MEDICINE

## 2018-01-01 PROCEDURE — 99310 SBSQ NF CARE HIGH MDM 45: CPT | Performed by: NURSE PRACTITIONER

## 2018-01-01 PROCEDURE — 87040 BLOOD CULTURE FOR BACTERIA: CPT | Performed by: EMERGENCY MEDICINE

## 2018-01-01 PROCEDURE — 25000125 ZZHC RX 250: Performed by: INTERNAL MEDICINE

## 2018-01-01 PROCEDURE — 80048 BASIC METABOLIC PNL TOTAL CA: CPT | Performed by: INTERNAL MEDICINE

## 2018-01-01 PROCEDURE — 99309 SBSQ NF CARE MODERATE MDM 30: CPT | Mod: GW | Performed by: NURSE PRACTITIONER

## 2018-01-01 PROCEDURE — 85025 COMPLETE CBC W/AUTO DIFF WBC: CPT | Performed by: EMERGENCY MEDICINE

## 2018-01-01 PROCEDURE — 94640 AIRWAY INHALATION TREATMENT: CPT

## 2018-01-01 PROCEDURE — 85610 PROTHROMBIN TIME: CPT | Performed by: EMERGENCY MEDICINE

## 2018-01-01 PROCEDURE — 84145 PROCALCITONIN (PCT): CPT | Performed by: INTERNAL MEDICINE

## 2018-01-01 PROCEDURE — 99309 SBSQ NF CARE MODERATE MDM 30: CPT | Performed by: NURSE PRACTITIONER

## 2018-01-01 PROCEDURE — 99233 SBSQ HOSP IP/OBS HIGH 50: CPT | Performed by: INTERNAL MEDICINE

## 2018-01-01 PROCEDURE — 93005 ELECTROCARDIOGRAM TRACING: CPT

## 2018-01-01 PROCEDURE — 71045 X-RAY EXAM CHEST 1 VIEW: CPT

## 2018-01-01 PROCEDURE — 80053 COMPREHEN METABOLIC PANEL: CPT | Performed by: EMERGENCY MEDICINE

## 2018-01-01 PROCEDURE — 99308 SBSQ NF CARE LOW MDM 20: CPT | Mod: GW | Performed by: NURSE PRACTITIONER

## 2018-01-01 PROCEDURE — 87804 INFLUENZA ASSAY W/OPTIC: CPT | Performed by: EMERGENCY MEDICINE

## 2018-01-01 PROCEDURE — 99309 SBSQ NF CARE MODERATE MDM 30: CPT | Mod: GW | Performed by: INTERNAL MEDICINE

## 2018-01-01 PROCEDURE — 99310 SBSQ NF CARE HIGH MDM 45: CPT | Mod: GW | Performed by: NURSE PRACTITIONER

## 2018-01-01 PROCEDURE — 87070 CULTURE OTHR SPECIMN AEROBIC: CPT | Performed by: INTERNAL MEDICINE

## 2018-01-01 PROCEDURE — 25000131 ZZH RX MED GY IP 250 OP 636 PS 637: Performed by: INTERNAL MEDICINE

## 2018-01-01 PROCEDURE — 87186 SC STD MICRODIL/AGAR DIL: CPT | Performed by: INTERNAL MEDICINE

## 2018-01-01 PROCEDURE — 36415 COLL VENOUS BLD VENIPUNCTURE: CPT | Performed by: EMERGENCY MEDICINE

## 2018-01-01 PROCEDURE — 99239 HOSP IP/OBS DSCHRG MGMT >30: CPT | Performed by: INTERNAL MEDICINE

## 2018-01-01 PROCEDURE — 93296 REM INTERROG EVL PM/IDS: CPT | Mod: GW | Performed by: INTERNAL MEDICINE

## 2018-01-01 PROCEDURE — 93294 REM INTERROG EVL PM/LDLS PM: CPT | Mod: GW | Performed by: INTERNAL MEDICINE

## 2018-01-01 PROCEDURE — 99223 1ST HOSP IP/OBS HIGH 75: CPT | Mod: AI | Performed by: INTERNAL MEDICINE

## 2018-01-01 PROCEDURE — 25000132 ZZH RX MED GY IP 250 OP 250 PS 637: Performed by: INTERNAL MEDICINE

## 2018-01-01 PROCEDURE — 99285 EMERGENCY DEPT VISIT HI MDM: CPT | Mod: 25

## 2018-01-01 PROCEDURE — 87077 CULTURE AEROBIC IDENTIFY: CPT | Performed by: INTERNAL MEDICINE

## 2018-01-01 PROCEDURE — 87106 FUNGI IDENTIFICATION YEAST: CPT | Performed by: INTERNAL MEDICINE

## 2018-01-01 PROCEDURE — 25000128 H RX IP 250 OP 636: Performed by: EMERGENCY MEDICINE

## 2018-01-01 RX ORDER — TAMSULOSIN HYDROCHLORIDE 0.4 MG/1
0.4 CAPSULE ORAL DAILY
Status: DISCONTINUED | OUTPATIENT
Start: 2018-01-01 | End: 2018-01-01 | Stop reason: HOSPADM

## 2018-01-01 RX ORDER — POLYETHYLENE GLYCOL 3350 17 G/17G
17 POWDER, FOR SOLUTION ORAL DAILY PRN
Status: DISCONTINUED | OUTPATIENT
Start: 2018-01-01 | End: 2018-01-01 | Stop reason: HOSPADM

## 2018-01-01 RX ORDER — FLUOROURACIL 50 MG/G
CREAM TOPICAL 2 TIMES DAILY
COMMUNITY
Start: 2018-01-01 | End: 2018-01-01

## 2018-01-01 RX ORDER — ONDANSETRON 4 MG/1
4 TABLET, ORALLY DISINTEGRATING ORAL EVERY 6 HOURS PRN
Status: DISCONTINUED | OUTPATIENT
Start: 2018-01-01 | End: 2018-01-01 | Stop reason: HOSPADM

## 2018-01-01 RX ORDER — DOXYCYCLINE HYCLATE 100 MG
100 TABLET ORAL EVERY 12 HOURS SCHEDULED
Status: DISCONTINUED | OUTPATIENT
Start: 2018-01-01 | End: 2018-01-01 | Stop reason: HOSPADM

## 2018-01-01 RX ORDER — NICOTINE POLACRILEX 4 MG
15-30 LOZENGE BUCCAL
Status: DISCONTINUED | OUTPATIENT
Start: 2018-01-01 | End: 2018-01-01 | Stop reason: HOSPADM

## 2018-01-01 RX ORDER — PREDNISONE 5 MG/1
5 TABLET ORAL DAILY
Status: DISCONTINUED | OUTPATIENT
Start: 2018-01-01 | End: 2018-01-01 | Stop reason: HOSPADM

## 2018-01-01 RX ORDER — AMOXICILLIN 250 MG
1 CAPSULE ORAL DAILY
Status: DISCONTINUED | OUTPATIENT
Start: 2018-01-01 | End: 2018-01-01 | Stop reason: HOSPADM

## 2018-01-01 RX ORDER — ACETAMINOPHEN 650 MG/1
650 SUPPOSITORY RECTAL EVERY 4 HOURS PRN
COMMUNITY

## 2018-01-01 RX ORDER — SENNOSIDES 8.6 MG
2 TABLET ORAL 2 TIMES DAILY PRN
Status: DISCONTINUED | OUTPATIENT
Start: 2018-01-01 | End: 2018-01-01 | Stop reason: HOSPADM

## 2018-01-01 RX ORDER — ACETAMINOPHEN 325 MG/1
650 TABLET ORAL EVERY 4 HOURS PRN
Status: DISCONTINUED | OUTPATIENT
Start: 2018-01-01 | End: 2018-01-01 | Stop reason: HOSPADM

## 2018-01-01 RX ORDER — CEFTRIAXONE SODIUM 2 G/50ML
2 INJECTION, SOLUTION INTRAVENOUS EVERY 24 HOURS
Status: DISCONTINUED | OUTPATIENT
Start: 2018-01-01 | End: 2018-01-01 | Stop reason: HOSPADM

## 2018-01-01 RX ORDER — FUROSEMIDE 20 MG/1
10 TABLET ORAL DAILY
Status: DISCONTINUED | OUTPATIENT
Start: 2018-01-01 | End: 2018-01-01 | Stop reason: HOSPADM

## 2018-01-01 RX ORDER — TRIAMCINOLONE ACETONIDE 1 MG/G
CREAM TOPICAL 2 TIMES DAILY
COMMUNITY

## 2018-01-01 RX ORDER — ALBUTEROL SULFATE 0.83 MG/ML
2.5 SOLUTION RESPIRATORY (INHALATION) ONCE
Status: COMPLETED | OUTPATIENT
Start: 2018-01-01 | End: 2018-01-01

## 2018-01-01 RX ORDER — POTASSIUM CHLORIDE 1500 MG/1
20 TABLET, EXTENDED RELEASE ORAL DAILY
COMMUNITY

## 2018-01-01 RX ORDER — LIDOCAINE 40 MG/G
CREAM TOPICAL 2 TIMES DAILY
COMMUNITY

## 2018-01-01 RX ORDER — HYDROMORPHONE HYDROCHLORIDE 2 MG/1
2 TABLET ORAL
Qty: 10 TABLET | Refills: 0 | Status: SHIPPED | OUTPATIENT
Start: 2018-01-01 | End: 2018-01-01

## 2018-01-01 RX ORDER — ALBUTEROL SULFATE 0.83 MG/ML
3 SOLUTION RESPIRATORY (INHALATION)
Status: DISCONTINUED | OUTPATIENT
Start: 2018-01-01 | End: 2018-01-01 | Stop reason: HOSPADM

## 2018-01-01 RX ORDER — HYDROXYZINE HYDROCHLORIDE 25 MG/1
25 TABLET, FILM COATED ORAL DAILY PRN
Status: DISCONTINUED | OUTPATIENT
Start: 2018-01-01 | End: 2018-01-01 | Stop reason: HOSPADM

## 2018-01-01 RX ORDER — ALBUTEROL SULFATE 0.83 MG/ML
1 SOLUTION RESPIRATORY (INHALATION) EVERY 4 HOURS PRN
COMMUNITY

## 2018-01-01 RX ORDER — GABAPENTIN 100 MG/1
200 CAPSULE ORAL EVERY 12 HOURS
Status: DISCONTINUED | OUTPATIENT
Start: 2018-01-01 | End: 2018-01-01 | Stop reason: HOSPADM

## 2018-01-01 RX ORDER — WARFARIN SODIUM 4 MG/1
4 TABLET ORAL
Status: DISCONTINUED | OUTPATIENT
Start: 2018-01-01 | End: 2018-01-01 | Stop reason: HOSPADM

## 2018-01-01 RX ORDER — NALOXONE HYDROCHLORIDE 0.4 MG/ML
.1-.4 INJECTION, SOLUTION INTRAMUSCULAR; INTRAVENOUS; SUBCUTANEOUS
Status: DISCONTINUED | OUTPATIENT
Start: 2018-01-01 | End: 2018-01-01 | Stop reason: HOSPADM

## 2018-01-01 RX ORDER — BISACODYL 10 MG
10 SUPPOSITORY, RECTAL RECTAL DAILY PRN
COMMUNITY

## 2018-01-01 RX ORDER — DOXYCYCLINE HYCLATE 100 MG
100 TABLET ORAL EVERY 12 HOURS
Qty: 10 TABLET | Refills: 0 | DISCHARGE
Start: 2018-01-01 | End: 2018-01-01

## 2018-01-01 RX ORDER — DULOXETIN HYDROCHLORIDE 30 MG/1
60 CAPSULE, DELAYED RELEASE ORAL AT BEDTIME
Status: DISCONTINUED | OUTPATIENT
Start: 2018-01-01 | End: 2018-01-01 | Stop reason: HOSPADM

## 2018-01-01 RX ORDER — HYDROMORPHONE HYDROCHLORIDE 2 MG/1
2 TABLET ORAL
Status: DISCONTINUED | OUTPATIENT
Start: 2018-01-01 | End: 2018-01-01 | Stop reason: HOSPADM

## 2018-01-01 RX ORDER — AMMONIUM LACTATE 12 G/100G
LOTION TOPICAL 2 TIMES DAILY
COMMUNITY

## 2018-01-01 RX ORDER — HYDROXYZINE HYDROCHLORIDE 25 MG/1
25 TABLET, FILM COATED ORAL 2 TIMES DAILY
Status: DISCONTINUED | OUTPATIENT
Start: 2018-01-01 | End: 2018-01-01 | Stop reason: HOSPADM

## 2018-01-01 RX ORDER — ATROPINE SULFATE 10 MG/ML
2 SOLUTION/ DROPS OPHTHALMIC
COMMUNITY

## 2018-01-01 RX ORDER — CLOTRIMAZOLE 1 %
CREAM (GRAM) TOPICAL 2 TIMES DAILY
COMMUNITY

## 2018-01-01 RX ORDER — ONDANSETRON 2 MG/ML
4 INJECTION INTRAMUSCULAR; INTRAVENOUS EVERY 6 HOURS PRN
Status: DISCONTINUED | OUTPATIENT
Start: 2018-01-01 | End: 2018-01-01 | Stop reason: HOSPADM

## 2018-01-01 RX ORDER — CARVEDILOL 3.12 MG/1
6.25 TABLET ORAL 2 TIMES DAILY WITH MEALS
Status: DISCONTINUED | OUTPATIENT
Start: 2018-01-01 | End: 2018-01-01 | Stop reason: HOSPADM

## 2018-01-01 RX ORDER — DEXTROSE MONOHYDRATE 25 G/50ML
25-50 INJECTION, SOLUTION INTRAVENOUS
Status: DISCONTINUED | OUTPATIENT
Start: 2018-01-01 | End: 2018-01-01 | Stop reason: HOSPADM

## 2018-01-01 RX ORDER — SODIUM CHLORIDE 9 MG/ML
INJECTION, SOLUTION INTRAVENOUS CONTINUOUS
Status: ACTIVE | OUTPATIENT
Start: 2018-01-01 | End: 2018-01-01

## 2018-01-01 RX ADMIN — HYDROMORPHONE HYDROCHLORIDE 2 MG: 2 TABLET ORAL at 20:41

## 2018-01-01 RX ADMIN — DOXYCYCLINE 100 MG: 100 INJECTION, POWDER, LYOPHILIZED, FOR SOLUTION INTRAVENOUS at 18:30

## 2018-01-01 RX ADMIN — CARVEDILOL 6.25 MG: 3.12 TABLET, FILM COATED ORAL at 08:45

## 2018-01-01 RX ADMIN — INSULIN ASPART 1 UNITS: 100 INJECTION, SOLUTION INTRAVENOUS; SUBCUTANEOUS at 08:47

## 2018-01-01 RX ADMIN — SODIUM CHLORIDE: 9 INJECTION, SOLUTION INTRAVENOUS at 17:26

## 2018-01-01 RX ADMIN — INSULIN ASPART 3 UNITS: 100 INJECTION, SOLUTION INTRAVENOUS; SUBCUTANEOUS at 17:43

## 2018-01-01 RX ADMIN — Medication 10 MG: at 08:17

## 2018-01-01 RX ADMIN — SENNOSIDES AND DOCUSATE SODIUM 1 TABLET: 8.6; 5 TABLET ORAL at 08:45

## 2018-01-01 RX ADMIN — Medication 10 MG: at 08:47

## 2018-01-01 RX ADMIN — HYDROMORPHONE HYDROCHLORIDE 2 MG: 2 TABLET ORAL at 06:06

## 2018-01-01 RX ADMIN — ALBUTEROL SULFATE 2.5 MG: 2.5 SOLUTION RESPIRATORY (INHALATION) at 09:48

## 2018-01-01 RX ADMIN — OMEPRAZOLE 40 MG: 20 CAPSULE, DELAYED RELEASE ORAL at 08:17

## 2018-01-01 RX ADMIN — GABAPENTIN 200 MG: 100 CAPSULE ORAL at 19:25

## 2018-01-01 RX ADMIN — OMEPRAZOLE 40 MG: 20 CAPSULE, DELAYED RELEASE ORAL at 08:46

## 2018-01-01 RX ADMIN — PREDNISONE 5 MG: 5 TABLET ORAL at 08:18

## 2018-01-01 RX ADMIN — DOXYCYCLINE 100 MG: 100 INJECTION, POWDER, LYOPHILIZED, FOR SOLUTION INTRAVENOUS at 17:33

## 2018-01-01 RX ADMIN — HYDROXYZINE HYDROCHLORIDE 25 MG: 25 TABLET ORAL at 19:25

## 2018-01-01 RX ADMIN — HYDROMORPHONE HYDROCHLORIDE 2 MG: 2 TABLET ORAL at 00:21

## 2018-01-01 RX ADMIN — CEFTRIAXONE SODIUM 2 G: 2 INJECTION, SOLUTION INTRAVENOUS at 19:23

## 2018-01-01 RX ADMIN — HYDROMORPHONE HYDROCHLORIDE 2 MG: 2 TABLET ORAL at 15:39

## 2018-01-01 RX ADMIN — INSULIN ASPART 2 UNITS: 100 INJECTION, SOLUTION INTRAVENOUS; SUBCUTANEOUS at 12:56

## 2018-01-01 RX ADMIN — INSULIN ASPART 2 UNITS: 100 INJECTION, SOLUTION INTRAVENOUS; SUBCUTANEOUS at 13:20

## 2018-01-01 RX ADMIN — DOXYCYCLINE 100 MG: 100 INJECTION, POWDER, LYOPHILIZED, FOR SOLUTION INTRAVENOUS at 05:09

## 2018-01-01 RX ADMIN — HYDROMORPHONE HYDROCHLORIDE 2 MG: 2 TABLET ORAL at 18:24

## 2018-01-01 RX ADMIN — CARVEDILOL 6.25 MG: 3.12 TABLET, FILM COATED ORAL at 17:33

## 2018-01-01 RX ADMIN — HYDROMORPHONE HYDROCHLORIDE 2 MG: 2 TABLET ORAL at 15:06

## 2018-01-01 RX ADMIN — CARVEDILOL 6.25 MG: 3.12 TABLET, FILM COATED ORAL at 18:24

## 2018-01-01 RX ADMIN — HYDROMORPHONE HYDROCHLORIDE 2 MG: 2 TABLET ORAL at 08:44

## 2018-01-01 RX ADMIN — PREDNISONE 5 MG: 5 TABLET ORAL at 08:46

## 2018-01-01 RX ADMIN — CEFTRIAXONE SODIUM 2 G: 2 INJECTION, SOLUTION INTRAVENOUS at 17:30

## 2018-01-01 RX ADMIN — GABAPENTIN 200 MG: 100 CAPSULE ORAL at 20:41

## 2018-01-01 RX ADMIN — INSULIN ASPART 2 UNITS: 100 INJECTION, SOLUTION INTRAVENOUS; SUBCUTANEOUS at 18:34

## 2018-01-01 RX ADMIN — TAMSULOSIN HYDROCHLORIDE 0.4 MG: 0.4 CAPSULE ORAL at 08:44

## 2018-01-01 RX ADMIN — HYDROMORPHONE HYDROCHLORIDE 2 MG: 2 TABLET ORAL at 01:01

## 2018-01-01 RX ADMIN — TAZOBACTAM SODIUM AND PIPERACILLIN SODIUM 3.38 G: 375; 3 INJECTION, SOLUTION INTRAVENOUS at 12:20

## 2018-01-01 RX ADMIN — HYDROXYZINE HYDROCHLORIDE 25 MG: 25 TABLET ORAL at 08:47

## 2018-01-01 RX ADMIN — TAMSULOSIN HYDROCHLORIDE 0.4 MG: 0.4 CAPSULE ORAL at 08:17

## 2018-01-01 RX ADMIN — DULOXETINE HYDROCHLORIDE 60 MG: 30 CAPSULE, DELAYED RELEASE ORAL at 21:21

## 2018-01-01 RX ADMIN — HYDROMORPHONE HYDROCHLORIDE 2 MG: 2 TABLET ORAL at 21:51

## 2018-01-01 RX ADMIN — HYDROXYZINE HYDROCHLORIDE 25 MG: 25 TABLET ORAL at 08:17

## 2018-01-01 RX ADMIN — HYDROMORPHONE HYDROCHLORIDE 2 MG: 2 TABLET ORAL at 04:46

## 2018-01-01 RX ADMIN — HYDROMORPHONE HYDROCHLORIDE 2 MG: 2 TABLET ORAL at 12:05

## 2018-01-01 RX ADMIN — SENNOSIDES AND DOCUSATE SODIUM 1 TABLET: 8.6; 5 TABLET ORAL at 08:17

## 2018-01-01 RX ADMIN — GABAPENTIN 200 MG: 100 CAPSULE ORAL at 08:46

## 2018-01-01 RX ADMIN — DULOXETINE HYDROCHLORIDE 60 MG: 30 CAPSULE, DELAYED RELEASE ORAL at 21:51

## 2018-01-01 RX ADMIN — HYDROXYZINE HYDROCHLORIDE 25 MG: 25 TABLET ORAL at 20:41

## 2018-01-01 RX ADMIN — HYDROMORPHONE HYDROCHLORIDE 2 MG: 2 TABLET ORAL at 08:17

## 2018-01-01 RX ADMIN — DOXYCYCLINE HYCLATE 100 MG: 100 TABLET, FILM COATED ORAL at 05:25

## 2018-01-01 RX ADMIN — ACETAMINOPHEN 650 MG: 325 TABLET, FILM COATED ORAL at 21:21

## 2018-01-01 RX ADMIN — HYDROMORPHONE HYDROCHLORIDE 2 MG: 2 TABLET ORAL at 17:33

## 2018-01-01 RX ADMIN — GABAPENTIN 200 MG: 100 CAPSULE ORAL at 08:17

## 2018-01-01 RX ADMIN — HYDROMORPHONE HYDROCHLORIDE 2 MG: 2 TABLET ORAL at 11:39

## 2018-01-01 RX ADMIN — CARVEDILOL 6.25 MG: 3.12 TABLET, FILM COATED ORAL at 08:17

## 2018-01-01 ASSESSMENT — ENCOUNTER SYMPTOMS
DYSURIA: 1
ABDOMINAL PAIN: 0
BLOOD IN STOOL: 0
COUGH: 1
CONFUSION: 1

## 2018-01-26 NOTE — PROGRESS NOTES
"[facility]    Chief Complaint   Patient presents with     Nursing Home Acute     Nausea       HPI:    Suad Sloan is a 87 year old  (12/23/1930), who is being seen today for an episodic care visit at [facility].  HPI information obtained from: facility chart records.Today's concern is:     Nausea  Decreased appetite     - Nursing reports patient c/o of nausea today and \"doesn't seem himself\". Patient alert,and answering questions appropriately- but responses are slower than patient baseline.  Reports nausea and bloating. States on and off \"for a week now.\" States noted some nausea several days ago but \"seemed to get better. Now it's back.\"  Denies constipation. Denies emesis or associated abd pain or diarrhea. Denies fever or chills. Denies noting increased SOB or chest pain. VSS, is afebrile. Abd exam unremarkable. BS+.     ALLERGIES: Ace inhibitors; Cleocin; Dulera; Erythromycin; Hydralazine; Imdur [isosorbide]; Methadone; Penicillins; and Spiriva handihaler  Past Medical, Surgical, Family and Social History reviewed and updated in [facility] [facility] [employer] [facility] [employer].    Current Outpatient Prescriptions   Medication Sig Dispense Refill     guaiFENesin (ROBITUSSIN) 100 MG/5ML SYRP Take 20 mLs by mouth once       PREDNISONE PO Take 5 mg by mouth daily       albuterol (2.5 MG/3ML) 0.083% neb solution Take 1 vial by nebulization 2 times daily AND Q4H PRN       clotrimazole (LOTRIMIN) 1 % cream Apply topically every 12 hours ; apply to scrotal/groint until rash is resolved       senna-docusate (SENOKOT-S;PERICOLACE) 8.6-50 MG per tablet Take 1 tablet by mouth daily       WARFARIN SODIUM PO Take 4 mg by mouth        HYDROmorphone HCl (DILAUDID PO) Take 2 mg by mouth every 3 hours       acetaminophen (TYLENOL) 325 MG tablet Take 325-650 mg by mouth every 4 hours as needed for mild pain       Potassium Chloride ER 20 MEQ TBCR Take 1 tablet (20 mEq) by mouth daily 90 tablet 1     furosemide (LASIX) 20 MG " tablet Take 0.5 tablets (10 mg) by mouth daily 30 tablet prn     HydrOXYzine Pamoate (VISTARIL PO) Take 25 mg by mouth Give 25 mg by mouth two times a day for Anxiety, pain Vistaril 25mg PO Q 12hrs scheduled 8 & 8 and 1 PRN dose in 24hrs. AND Give 25 mg by mouth every 24 hours as needed for Anxiety, Pain One PRN dose in 24hrs       sennosides (SENOKOT) 8.6 MG tablet Take 2 tablets by mouth 2 times daily as needed        METFORMIN HCL PO Take 250 mg by mouth 2 times daily (with meals)       Gabapentin (NEURONTIN PO) Take 200 mg by mouth every 12 hours Reported on 3/27/2017       DULOXETINE HCL PO Take 60 mg by mouth At Bedtime       ferrous sulfate (IRON) 325 (65 FE) MG tablet Take 325 mg by mouth 2 times daily Give with orange juice       triamcinolone (KENALOG) 0.1 % ointment Apply topically 2 times daily Apply to Both lower extremities topically every day and evening shift       ammonium lactate (LAC-HYDRIN) 12 % lotion Apply topically 2 times daily       omeprazole (PRILOSEC) 40 MG capsule Take 1 capsule (40 mg) by mouth daily Take 30-60 minutes before a meal. 90 capsule 3     carvedilol (COREG) 12.5 MG tablet Take 6.25 mg by mouth 2 times daily (with meals)       albuterol (PROAIR HFA, PROVENTIL HFA, VENTOLIN HFA) 108 (90 BASE) MCG/ACT inhaler Inhale 2 puffs into the lungs 2 times daily as needed for shortness of breath / dyspnea or wheezing       tamsulosin (FLOMAX) 0.4 MG 24 hr capsule TAKE ONE CAPSULE BY MOUTH EVERY DAY 90 capsule 3     Medications reviewed:  Medications reconciled to facility chart and changes were made to reflect current medications as identified as above med list. Below are the changes that were made:   Medications stopped since last EPIC medication reconciliation:   There are no discontinued medications.    Medications started since last [facility] medication reconciliation:  Orders Placed This Encounter   Medications     guaiFENesin (ROBITUSSIN) 100 MG/5ML SYRP     Sig: Take 20 mLs by mouth  "once         REVIEW OF SYSTEMS:  4 point ROS including Respiratory, CV, GI and , other than that noted in the HPI,  is negative    Physical Exam:  /75  Pulse 65  Temp 98.3  F (36.8  C)  Resp 17  Ht 5' 9\" (1.753 m)  Wt 163 lb 4.8 oz (74.1 kg)  SpO2 97%  BMI 24.12 kg/m2  Resp: Effort WNL, LS decreased througout  CV: VSS, no edema-   Abd- soft, nontender, BS +  Musc- MELENDEZ  Psych- alert, calm, pleasant      BP 12/03-01/23: 134-154/61-85 mmHg    Recent Labs:     CBC RESULTS:   Recent Labs   Lab Test  10/18/17   0640  09/22/17   0600   WBC  9.3  7.0   RBC  3.93*  3.54*   HGB  11.0*  10.0*   HCT  34.6*  31.7*   MCV  88  90   MCH  28.0  28.2   MCHC  31.8  31.5   RDW  14.1  14.0   PLT  211  183       Last Basic Metabolic Panel:  Recent Labs   Lab Test  10/18/17   0640  09/22/17   0600   NA  139  140   POTASSIUM  4.7  4.0   CHLORIDE  105  105   REGAN  8.8  8.6   CO2  27  27   BUN  36*  29   CR  1.48*  1.34*   GLC  95  105*     Lab Results   Component Value Date    A1C 7.8 10/18/2017    A1C 7.0 04/19/2017         Assessment/Plan:  Nausea  Decreased appetite  - vague symptoms - ? Viral GI vs UTI (h/o).   - monitor VS and clinical status closely- report fever or worsening s/s to provider  - add prn Zofran  - encourage po fluids and advance diet as tolerated.   - send urine for UC    Discussed above with patient and with patient wife on phone today- who agree with above plan.           Electronically signed by  MARINA Jean-Baptiste CNP                  "

## 2018-01-28 PROBLEM — J18.9 PNEUMONIA: Status: ACTIVE | Noted: 2018-01-01

## 2018-01-28 NOTE — PHARMACY-ADMISSION MEDICATION HISTORY
Admission medication history interview status for this patient is complete. See [facility] admission navigator for allergy information, prior to admission medications and immunization status.     Medication history interview source(s):none  Medication history resources (including written lists, pill bottles, clinic record):MAR    Changes made to PTA medication list:  Added: none  Deleted: lac-hydrin cream, triamcinolone cream, Lotrimin cream  Changed: none    Actions taken by pharmacist (provider contacted, etc):called Yash henao to request [phone]     Additional medication history information:None    Medication reconciliation/reorder completed by provider prior to medication history? No    For patients on insulin therapy: no (Yes/No)   Lantus/levemir/NPH/Mix 70/30 dose: ___ in AM/PM or twice daily   Sliding scale Novolog Y/N   If Yes, do you have a baseline novolog pre-meal dose: ______units with meals   Patients eat three meals a day: Y/N ---  How many episodes of hypoglycemia (low blood glucose) do you have weekly: ---   How many missed doses do you have a week: ---  How many times do you check your blood glucose per day: ---  Any Barriers to therapy: cost of medications/comfortable with giving injections (if applicable)/ comfortable and confident with current diabetes regimen ---      Prior to Admission medications    Medication Sig Last Dose Taking? Auth Provider   albuterol (2.5 MG/3ML) 0.083% neb solution Take 1 vial by nebulization every 4 hours as needed for shortness of breath / dyspnea or wheezing prn Yes Unknown, Entered By History   HydrOXYzine Pamoate (VISTARIL PO) Take 25 mg by mouth daily as needed for anxiety Past Week at Unknown time Yes Unknown, Entered By History   Ondansetron (ZOFRAN ODT PO) Take 4 mg by mouth every 6 hours as needed for nausea prn Yes Unknown, Entered By History   PREDNISONE PO Take 5 mg by mouth daily 1/28/2018 at am Yes Reported, Patient   albuterol (2.5 MG/3ML)  0.083% neb solution Take 1 vial by nebulization 2 times daily AND Q4H PRN 1/28/2018 at 8am Yes Reported, Patient   senna-docusate (SENOKOT-S;PERICOLACE) 8.6-50 MG per tablet Take 1 tablet by mouth daily 1/28/2018 at am Yes Reported, Patient   WARFARIN SODIUM PO Take 4 mg by mouth At Bedtime  1/27/2018 at 2000 Yes Reported, Patient   HYDROmorphone HCl (DILAUDID PO) Take 2 mg by mouth every 3 hours 1/28/2018 at 1200 Yes Reported, Patient   acetaminophen (TYLENOL) 325 MG tablet Take 325-650 mg by mouth every 4 hours as needed for mild pain prn Yes Reported, Patient   Potassium Chloride ER 20 MEQ TBCR Take 1 tablet (20 mEq) by mouth daily 1/28/2018 at am Yes Caro Warner APRN CNP   furosemide (LASIX) 20 MG tablet Take 0.5 tablets (10 mg) by mouth daily 1/28/2018 at am Yes Caro Warner APRN CNP   HydrOXYzine Pamoate (VISTARIL PO) Take 25 mg by mouth 2 times daily  1/28/2018 at am Yes Reported, Patient   sennosides (SENOKOT) 8.6 MG tablet Take 2 tablets by mouth 2 times daily as needed  prn Yes Reported, Patient   METFORMIN HCL PO Take 250 mg by mouth 2 times daily (with meals) 1/28/2018 at am Yes Reported, Patient   Gabapentin (NEURONTIN PO) Take 200 mg by mouth every 12 hours Reported on 3/27/2017 1/28/2018 at am Yes Reported, Patient   DULOXETINE HCL PO Take 60 mg by mouth At Bedtime 1/27/2018 at Unknown time Yes Reported, Patient   ferrous sulfate (IRON) 325 (65 FE) MG tablet Take 325 mg by mouth 2 times daily Give with orange juice 1/28/2018 at am Yes Reported, Patient   omeprazole (PRILOSEC) 40 MG capsule Take 1 capsule (40 mg) by mouth daily Take 30-60 minutes before a meal. 1/28/2018 at am Yes Chidi Berger MD   carvedilol (COREG) 12.5 MG tablet Take 6.25 mg by mouth 2 times daily (with meals) 1/28/2018 at am Yes Unknown, Entered By History   albuterol (PROAIR HFA, PROVENTIL HFA, VENTOLIN HFA) 108 (90 BASE) MCG/ACT inhaler Inhale 2 puffs into the lungs 2 times daily as  needed for shortness of breath / dyspnea or wheezing 1/28/2018 at 1349 Yes Reported, Patient   tamsulosin (FLOMAX) 0.4 MG 24 hr capsule TAKE ONE CAPSULE BY MOUTH EVERY DAY 1/28/2018 at am Yes Chidi Berger MD

## 2018-01-28 NOTE — IP AVS SNAPSHOT
"    [facility]: [phone]                                              INTERAGENCY TRANSFER FORM - PHYSICIAN ORDERS   2018                    Hospital Admission Date: 2018  KARLO SEVERINO   : 1930  Sex: Male        Attending Provider: Ab Flores MD     Allergies:  Ace Inhibitors, Cleocin, Dulera, Erythromycin, Hydralazine, Imdur [Isosorbide], Methadone, Penicillins, Spiriva Handihaler    Infection:  None   Service:  GENERAL MEDI    Ht:  1.753 m (5' 9\")   Wt:  74.7 kg (164 lb 11.2 oz)   Admission Wt:  75.3 kg (166 lb)    BMI:  24.32 kg/m 2   BSA:  1.91 m 2            Patient PCP Information     Provider PCP Type    MARINA Jean-Baptiste CNP General      ED Clinical Impression     Diagnosis Description Comment Added By Time Added    Shortness of breath [R06.02] Shortness of breath [R06.02]  Fabiano Mayers MD 2018 11:08 AM    Hypoxia [R09.02] Hypoxia [R09.02]  Fabiano Mayers MD 2018 11:08 AM    Chronic anticoagulation [Z79.01] Chronic anticoagulation [Z79.01]  Fabiano Mayers MD 2018 11:09 AM    Renal insufficiency [N28.9] Renal insufficiency [N28.9]  Fabiano Mayers MD 2018 11:09 AM    Pneumonia due to infectious organism, unspecified laterality, unspecified part of lung [J18.9] Pneumonia due to infectious organism, unspecified laterality, unspecified part of lung [J18.9]  Fabiano Mayers MD 2018 11:09 AM      Hospital Problems as of 2018              Priority Class Noted POA    Pneumonia Medium  2018 Yes      Non-Hospital Problems as of 2018              Priority Class Noted    Hypertension goal BP (blood pressure) < 140/90 High  2004    Localized osteoarthritis of hand Medium  2005    Malignant neoplasm of prostate (H) High  2005    Other iron deficiency anemias High  2005    Overlapping malignant neoplasm of colon (H) High  Unknown    Diverticulosis of large intestine Low  " Unknown    Neoplasm of bladder High  Unknown    Advance care planning Low  6/13/2011    Health Care Home Low  9/16/2011    CKD (chronic kidney disease) stage 3, GFR 30-59 ml/min High  Unknown    COPD, moderate (H) High  Unknown    Chronic foot pain Medium  4/18/2013    Chronic hand pain Medium  4/18/2013    CAD (coronary artery disease) Medium  6/13/2014    Cardiac pacemaker in situ Medium  6/13/2014    Peripheral neuropathy Medium  Unknown    Major depressive disorder, single episode, moderate (H) Medium  Unknown    Abdominal aortic aneurysm (H) Medium  Unknown    Cardiomyopathy Medium  Unknown    Chronic pain Medium  Unknown    Type 2 diabetes mellitus with diabetic chronic kidney disease (H) Medium  10/22/2015    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) Medium  10/22/2015    Type 2 diabetes mellitus with other circulatory complications Medium  10/22/2015    Pseudoaneurysm of aorta (H) Medium  12/16/2015    Chronic systolic congestive heart failure (H) Medium  Unknown    Dependence on nicotine from cigarettes,has been smoking again to relax> now nicotine patch High  7/11/2016    Type 2 diabetes mellitus with diabetic neuropathy (H) High  7/11/2016    Neuropathy (H), 2nd to T2DM High  7/11/2016    Routine general medical examination at a health care facility, done 7- Low  7/11/2016    Generalized muscle weakness High  7/27/2016    History of colon cancer, no staging Medium  7/27/2016    Osteoarthritis High  8/29/2016    Paroxysmal atrial fibrillation (H) Medium  9/7/2016    Encounter for monitoring coumadin therapy Medium  9/7/2016    Closed fracture of neck of left femur (H) High  9/26/2016    Medication intolerance>> Methadone> very confused High  10/12/2016    Anxiety disorder, unspecified type Medium  10/21/2016    History of acute gouty arthritis Medium  5/26/2017    Alcohol dependence in remission (H) Medium  7/28/2017    Ventral hernia without obstruction or gangrene Medium  7/28/2017     Acute cystitis without hematuria Medium  9/25/2017      Code Status History     Date Active Date Inactive Code Status Order ID Comments User Context    7/11/2016  2:57 PM 1/28/2018  3:47 PM DNR/DNI [id number]  Alana Caroamado Jensen, MARINA CNP Outpatient    7/4/2016  1:28 AM 7/9/2016  4:59 PM DNR/DNI [id number]  Panfilo Russell DO Inpatient    6/24/2016 12:34 PM 7/4/2016  1:28 AM DNR/DNI [id number]  Pinky Chirinos PA-C Outpatient    6/23/2016  5:48 PM 6/24/2016 12:34 PM DNR/DNI [id number]  Ashley Cross PA-C ED    3/28/2016  3:30 PM 6/23/2016  5:48 PM DNR/DNI [id number]  Eulogio Cevallos MD Outpatient    3/24/2016  9:24 PM 3/28/2016  3:30 PM DNR/DNI [id number]  Gilberto Holly MD Inpatient    12/16/2015 11:49 AM 12/17/2015  4:02 PM Full Code [id number]  Ivanna Turner MD Inpatient    4/12/2015  2:16 PM 4/13/2015  1:14 PM DNR/DNI [id number]  Tereza Pizarro PA-C ED    12/5/2014  9:15 PM 12/11/2014  4:08 PM DNR/DNI [id number]  Grzegorz Rao PA-C Inpatient    8/28/2014 10:58 AM 12/5/2014  9:15 PM DNR/DNI [id number]  Gilberto Holly MD Outpatient    8/26/2014  2:58 AM 8/28/2014 10:58 AM DNR/DNI [id number]  Sarthak Osborn MD Inpatient    4/13/2014 11:07 AM 8/26/2014  2:58 AM DNR/DNI [id number]  Gilberto Holly MD Outpatient    4/11/2014  8:08 PM 4/13/2014 11:07 AM DNR/DNI [id number]  Aristides Soriano MD Inpatient    4/10/2014 12:15 PM 4/11/2014  8:08 PM DNR/DNI [id number]  Gilberto Holly MD Outpatient    4/9/2014  4:49 PM 4/10/2014 12:15 PM DNR/DNI [id number]  Gilberto Holly MD Inpatient    4/9/2014  3:47 PM 4/9/2014  4:49 PM DNR [id number]  Gilma Fierro PA-C Inpatient         Medication Review      START taking        Dose / Directions Comments    doxycycline 100 MG tablet   Commonly known as:  VIBRA-TABS   Indication:  Community Acquired Pneumonia   Used for:  Pneumonia due to infectious organism, unspecified laterality, unspecified  part of lung        Dose:  100 mg   Take 1 tablet (100 mg) by mouth every 12 hours for 5 days   Quantity:  10 tablet   Refills:  0          CONTINUE these medications which have NOT CHANGED        Dose / Directions Comments    acetaminophen 325 MG tablet   Commonly known as:  TYLENOL        Dose:  325-650 mg   Take 325-650 mg by mouth every 4 hours as needed for mild pain   Refills:  0        * albuterol 108 (90 BASE) MCG/ACT Inhaler   Commonly known as:  PROAIR HFA/PROVENTIL HFA/VENTOLIN HFA        Dose:  2 puff   Inhale 2 puffs into the lungs 2 times daily as needed for shortness of breath / dyspnea or wheezing   Refills:  0        * albuterol (2.5 MG/3ML) 0.083% neb solution        Dose:  1 vial   Take 1 vial by nebulization 2 times daily AND Q4H PRN   Refills:  0        * albuterol (2.5 MG/3ML) 0.083% neb solution        Dose:  1 vial   Take 1 vial by nebulization every 4 hours as needed for shortness of breath / dyspnea or wheezing   Refills:  0        carvedilol 12.5 MG tablet   Commonly known as:  COREG        Dose:  6.25 mg   Take 6.25 mg by mouth 2 times daily (with meals)   Refills:  0        DULOXETINE HCL PO        Dose:  60 mg   Take 60 mg by mouth At Bedtime   Refills:  0        ferrous sulfate 325 (65 FE) MG tablet   Commonly known as:  IRON        Dose:  325 mg   Take 325 mg by mouth 2 times daily Give with orange juice   Refills:  0        furosemide 20 MG tablet   Commonly known as:  LASIX   Used for:  Diastolic congestive heart failure, unspecified congestive heart failure chronicity (H)        Dose:  10 mg   Take 0.5 tablets (10 mg) by mouth daily   Quantity:  30 tablet   Refills:  prn        HYDROmorphone 2 MG tablet   Commonly known as:  DILAUDID   Used for:  Other chronic pain        Dose:  2 mg   Take 1 tablet (2 mg) by mouth every 3 hours   Quantity:  10 tablet   Refills:  0        METFORMIN HCL PO        Dose:  250 mg   Take 250 mg by mouth 2 times daily (with meals)   Refills:  0         NEURONTIN PO        Dose:  200 mg   Take 200 mg by mouth every 12 hours Reported on 3/27/2017   Refills:  0        omeprazole 40 MG capsule   Commonly known as:  priLOSEC   Used for:  Acute upper gastrointestinal hemorrhage        Dose:  40 mg   Take 1 capsule (40 mg) by mouth daily Take 30-60 minutes before a meal.   Quantity:  90 capsule   Refills:  3        Potassium Chloride ER 20 MEQ Tbcr   Used for:  Hypokalemia        Dose:  20 mEq   Take 1 tablet (20 mEq) by mouth daily   Quantity:  90 tablet   Refills:  1        PREDNISONE PO        Dose:  5 mg   Take 5 mg by mouth daily   Refills:  0        senna-docusate 8.6-50 MG per tablet   Commonly known as:  SENOKOT-S;PERICOLACE        Dose:  1 tablet   Take 1 tablet by mouth daily   Refills:  0        sennosides 8.6 MG tablet   Commonly known as:  SENOKOT        Dose:  2 tablet   Take 2 tablets by mouth 2 times daily as needed   Refills:  0        tamsulosin 0.4 MG capsule   Commonly known as:  FLOMAX   Used for:  Malignant neoplasm of prostate (H)        TAKE ONE CAPSULE BY MOUTH EVERY DAY   Quantity:  90 capsule   Refills:  3        * VISTARIL PO   Indication:  Anxiety associated with Organic Disease        Dose:  25 mg   Take 25 mg by mouth 2 times daily   Refills:  0        * VISTARIL PO        Dose:  25 mg   Take 25 mg by mouth daily as needed for anxiety   Refills:  0        WARFARIN SODIUM PO        Dose:  4 mg   Take 4 mg by mouth At Bedtime   Refills:  0        ZOFRAN ODT PO        Dose:  4 mg   Take 4 mg by mouth every 6 hours as needed for nausea   Refills:  0        * Notice:  This list has 5 medication(s) that are the same as other medications prescribed for you. Read the directions carefully, and ask your doctor or other care provider to review them with you.            Summary of Visit     Reason for your hospital stay       Pneumonia             After Care     Activity - Up ad jarrod           Advance Diet as Tolerated       Follow this diet upon  discharge: Orders Placed This Encounter      Moderate Consistent CHO Diet       General info for SNF       Length of Stay Estimate: Short Term Care: Estimated # of Days <30  Condition at Discharge: Stable  Level of care:skilled   Rehabilitation Potential: Fair  Admission H&P remains valid and up-to-date: Yes  Recent Chemotherapy: N/A  Use Nursing Home Standing Orders: Yes       Mantoux instructions       Give two-step Mantoux (PPD) Per Facility Policy Yes             Your next 10 appointments already scheduled     Mar 01, 2018  4:45 PM CST   Remote PPM Check with ALONZO TECH1   [facility] ([facility])    [facility]  [facility] [id number] [phone]           This appointment is for a remote check of your pacemaker.  This is not an appointment at the office.              Follow-Up Appointment Instructions     Future Labs/Procedures    Follow Up and recommended labs and tests     Comments:    Follow up with Nursing home physician.  INR check in 2-3 days. Coumadin dosing and INR monitoring per PCP/nursing home physician      Follow-Up Appointment Instructions     Follow Up and recommended labs and tests       Follow up with Nursing home physician.  INR check in 2-3 days. Coumadin dosing and INR monitoring per PCP/nursing home physician             Statement of Approval     Ordered          01/30/18 1040  I have reviewed and agree with all the recommendations and orders detailed in this document.  EFFECTIVE NOW     Approved and electronically signed by:  Candie Marley MD

## 2018-01-28 NOTE — PHARMACY-ANTICOAGULATION SERVICE
Clinical Pharmacy - Warfarin Dosing Consult     Pharmacy has been consulted to manage this patient s warfarin therapy.  Indication: Atrial Fibrillation  Therapy Goal: INR 2-3  Warfarin Prior to Admission: Yes  Warfarin PTA Regimen: 4mg daily  Significant drug interactions: Doxycycline  Recent documented change in oral intake/nutrition: Unknown  Dose Comments: No dose tonight since INR=2.93 and pt on Doxycycline.    INR   Date Value Ref Range Status   01/28/2018 2.93 (H) 0.86 - 1.14 Final   01/28/2018 Canceled, Test credited, specimen discarded 0.86 - 1.14 Final     Comment:     Unsatisfactory specimen - tube underfilled       Pharmacy will monitor Suad Sloan daily and order warfarin doses to achieve specified goal.      Please contact pharmacy as soon as possible if the warfarin needs to be held for a procedure or if the warfarin goals change.

## 2018-01-28 NOTE — ED NOTES
[facility]  ED Nurse Handoff Report    Suad Sloan is a 87 year old male   ED Chief complaint: Shortness of Breath and Cough  . ED Diagnosis:   Final diagnoses:   Shortness of breath   Hypoxia   Chronic anticoagulation   Renal insufficiency   Pneumonia due to infectious organism, unspecified laterality, unspecified part of lung     Allergies:   Allergies   Allergen Reactions     Ace Inhibitors      hyperkalemia     Cleocin      Severe Heartburn     Dulera      Leg cramps, gas, mouth sores     Erythromycin      upset stomach     Hydralazine      Throat swelling     Imdur [Isosorbide]      Stomach upset     Methadone Other (See Comments)     Became very confused and too sedated     Penicillins Nausea     Spiriva Handihaler      Mouth sores, leg cramps       Code Status: DNR / DNI  Activity level - Baseline/Home:  Total Care. Activity Level - Current:   Total Care. Lift room needed: No. Bariatric: No   Needed: No   Isolation: No. Infection: Not Applicable.     Vital Signs:   Vitals:    01/28/18 1200 01/28/18 1215 01/28/18 1230 01/28/18 1231   BP: 115/63 127/65 (!) 118/96    Resp: 13 19 11 16   Temp:       TempSrc:       SpO2:   100% 96%       Cardiac Rhythm:  ,      Pain level: 0-10 Pain Scale: 0  Patient confused: No. Patient Falls Risk: Yes.   Elimination Status: Has voided   Patient Report - Initial Complaint: sob. Focused Assessment: bilateral wheezes, coarse LS, pt has pacemaker-pt paced, painful urination, productive cough  Tests Performed: labs, xray. Abnormal Results: see epic, hgb 10.2, creat 1.857, INR 2.93, PTT 45sec  Treatments provided: zoysn 3.375 given  Family Comments: wife bedside  OBS brochure/video discussed/provided to patient:  N/A  ED Medications:   Medications   piperacillin-tazobactam (ZOSYN) infusion 3.375 g (3.375 g Intravenous New Bag 1/28/18 1220)   albuterol neb solution 2.5 mg (2.5 mg Nebulization Given 1/28/18 0976)     Drips infusing:  No  For the majority of  the shift, the patient's behavior Green. Interventions performed were NA.     Severe Sepsis OR Septic Shock Diagnosis Present: No      ED Nurse Name/Phone Number: Mallory Hercules,   12:37 PM    RECEIVING UNIT ED HANDOFF REVIEW    Above ED Nurse Handoff Report was reviewed: Yes  Reviewed by: Daniella Ponce on January 28, 2018 at 2:25 PM

## 2018-01-28 NOTE — H&P
Admitted:     01/28/2018      CHIEF COMPLAINT:  Increasing confusion, shortness of breath, cough.      HISTORY OF PRESENT ILLNESS:  Mr. Sloan is an 87-year-old male with a history of COPD.  He resides at a [facility]term care [facility].  He presented to the hospital from the care facility for the above concerns.  Staff noted that over the past several days he has had increasing confusion.  His wife who was at bedside and I am obtaining history from also noted that as well.  He does not have a history of dementia.  She thought the patient likely had a urinary tract infection as he has behaved like this in the past with previous UTIs.  She was surprised that urinalysis today in the ER did not show infection.      The patient has been having some shortness of breath and cough, though it sounds like he chronically has this and may not be a lot worse than usual.  He uses intermittent oxygen at his care facility when he feels like he needs it.      On arrival to the ER, vital signs included a blood pressure of 125/75 with heart rate of 65.  Afebrile, saturation 97% on 1.5 liters of oxygen.  Workup in the ER included labs and imaging.  White cell count is 8.6.  Hemoglobin is 10.2.  Platelet count is 160.  Lactic acid normal.  BUN is 15, creatinine is 1.9.  Influenza titer negative.  INR is therapeutic at 2.93 on warfarin.  Urinalysis shows 8 red blood cells and 0 white blood cells with no nitrites or leukocyte esterase.  Imaging included a chest x-ray showing a hazy opacity in the left lower lung.  I did personally review this as well.      Given the patient's symptoms and findings, Dr. Mayers, whom I spoke with would like to admit the patient for a presumed diagnosis of pneumonia to the hospital in the setting of encephalopathy.      PAST MEDICAL HISTORY:   1.  Atrial fibrillation maintained on warfarin for anticoagulation.   2.  COPD with intermittent home oxygen use.   3.  Chronic pain syndrome  with left leg pain.   4.  Diabetes mellitus type 2 on oral medications.   6.  Chronic kidney disease with baseline creatinine near 1.5 on chart review.   7.  Cardiomyopathy history.  Echocardiogram from 2016 showed ejection fraction of 25%.   8.  Status post pacemaker placement.   9.  History of coronary artery disease.   10.  Dysphagia history.    11.  History of prostate cancer.   12.  Abdominal aortic aneurysm, status post repair.   13.  History of colon cancer.   14.  Seep apnea per chart review.   15.  Restless legs syndrome.   16.  Peripheral neuropathy.   17.  Anxiety.   18.  Bladder cancer.   19.  DNR/DNI code status.      CURRENT MEDICATIONS:  Await pharmacy reconciliation of medication list.  Medications appear to include the followin.  Acetaminophen.   2.  Albuterol.   3.  Lac-Hydrin lotion.   4.  Coreg.   5.  Lotrimin cream.   6.  Duloxetine.   7.  Ferrous sulfate.   8.  Lasix.   9.  Gabapentin.   10.  Dilaudid.   11.  Hydroxyzine.   12.  Metformin.   13.  Omeprazole.   14.  Prednisone.   15.  Senokot.   16.  Tamsulosin.   17.  Kenalog cream.   18.  Warfarin.      ALLERGIES:   1.  ACE INHIBITORS.   2.  CLEOCIN.   3.  DULERA.   4.  ERYTHROMYCIN.   5.  HYDRALAZINE.   6.  IMDUR.   7.  METHADONE.   8.  PENICILLIN.   9.  SPIRIVA.      FAMILY HISTORY:  Reviewed.  Nothing contributory to this admission history.      SOCIAL HISTORY:  The patient currently resides at [facility] for long-term care.  DNR/DNI confirmed with the patient and wife today.  He has a history of alcohol abuse but has not drank for many, many years.  Quit smoking about 8 years ago.      REVIEW OF SYSTEMS:  See HPI for details.  Comprehensive 10 point review of systems otherwise negative besides that mentioned above.      PHYSICAL EXAMINATION:   VITAL SIGNS:  Blood pressure is currently 120/95 with a heart rate of 65.  Afebrile, saturation 96% on 1.5 liters of oxygen.   GENERAL:  The patient appears nontoxic and in no  acute distress.  He appears awake and alert.  Some mild confusion present.  Wife is answering some questions, although patient does a reasonable job.  He is able to maintain wakefulness throughout my visit with him.   HEENT:  Head is atraumatic, sclerae white.  Eyelids normal.  Conjunctivae normal.  Extraocular movements are intact.   NECK:  Supple.  No cervical or supraclavicular lymphadenopathy.   HEART:  Regular rate and rhythm.  No significant murmurs.  No lower extremity edema.   LUNGS:  Notable for few crackles at the left base, otherwise clear.  No wheezes.  No intercostal retractions or conversational dyspnea.   ABDOMEN:  Nontender, nondistended.  Soft.  No masses.  No organomegaly.   EXTREMITIES:  No edema.   SKIN:  Reveals no rash.  No jaundice.  Skin is dry to touch.   NEUROLOGIC:  Cranial nerves II-XII are intact.  Moves all extremities appropriately.  Sensation intact to light touch in upper and lower extremities bilaterally.   PSYCHIATRIC:  The patient is awake and alert.  Some mild confusion as mentioned above.  No anxiety with normal and affect and mood.      LABORATORY AND IMAGING DATA:  Reviewed above in HPI.  EKG I personally reviewed shows a paced rhythm with heart rate of 64.      I personally reviewed the chest x-rays as well.      IMPRESSION AND PLAN:  Mr. Sloan is an 87-year-old male with a history of chronic obstructive pulmonary disease, diabetes mellitus, and long-term care resident of [facility].  He presents to the hospital today for increasing confusion with shortness of breath and cough.  On presentation, imaging notable for chest x-ray showing what appears to be left lower lobe infiltrate concerning for pneumonia.  Lab work notable for acute renal failure and chronic kidney disease.  He is being admitted to the hospitalist service.   1.  Pneumonia:  We will treat with Rocephin and doxycycline in the setting of his ERYTHROMYCIN ALLERGY.  He does not appear septic or toxic.   Actually he has no fever and normal white blood cell count.  He has some shortness of breath and cough, although this is somewhat chronic for him.  I am going to check a procalcitonin level.  If this is completely normal, would consider stopping antibiotics or at least shortening the course of his antibiotic therapy.  Influenza antigen negative.   2.  Acute renal failure on chronic kidney disease:  I suspect related to dehydration.  We will administer IV fluids overnight.   3.  Cardiomyopathy history with ejection fraction near 25%:  Appears euvolemic to dry on exam.  IV fluids as above.   4.  Diabetes mellitus,  On oral medications.  Resume when clarified by pharmacy.  Insulin sliding scale as needed.   5.  Encephalopathy:  No known history of cognitive dysfunction or dementia, wife confirms this.  Increasing confusion for the past few days.  May be infectious related versus dehydration are most likely suspects.  Monitor while in hospital.  No focal findings on exam to suggest a cerebrovascular accident.      The patient will be admitted to inpatient status in the setting of his encephalopathy, possible pneumonia and acute renal failure with dehydration.  I expect greater than 2 midnights in the hospital.      The patient is DNR/DNI.         WILLY SINGH MD             D: 2018   T: 2018   MT: FLORIAN      Name:     KARLO SEVERINO   MRN:      [id number]        Account:      [id number]   :      1930        Admitted:     2018                   Document: [id number]

## 2018-01-28 NOTE — ED NOTES
[facility] Medicine  Discharge Summary    Nury Cárdenas MRN# [id number]   Age: 73 year old YOB: 1944     Date of Admission:  7/27/2017  Date of Discharge:  7/30/2017  3:11 PM  Admitting Physician:  Britt Weaver MD  Discharge Physician:  Sergio Suero Contact: [phone]  Discharging Service:  [facility]     Primary Provider:   Phil Abbott  [address] [address]  [phone]          Discharge Disposition:   Discharged to home           Condition on Discharge:   Discharge condition: Stable   Code status on discharge: Full Code          Admission Diagnoses:   Cellulitis of right lower extremity [L03.115]          Principle Discharge Problem:   Cellulitis         Additional Discharge Problems:     Patient Active Problem List   Diagnosis     GERD (gastroesophageal reflux disease)     Fibroids     Migraines     Hearing loss     Osteopenia     HYPERLIPIDEMIA LDL GOAL <160     Advanced directives, counseling/discussion     Inflammatory arthritis     Synovitis of wrist     Chronic constipation     Granulomatosis with polyangiitis (H)     Chronic steroid use     Dyspnea     PE (pulmonary embolism)     Cataract, mild, ou     Calculus of kidney, right     Chronic anticoagulation     Long-term (current) use of anticoagulants [Z79.01]     Long-term use of immunosuppressant medication     Primary osteoarthritis involving multiple joints     Cellulitis     Cat scratch left lower extremity            Medications Prior to Admission:       Current Facility-Administered Medications on File Prior to Encounter:  sodium chloride (PF) 0.9% PF flush 60 mL     Current Outpatient Prescriptions on File Prior to Encounter:  amoxicillin-clavulanate (AUGMENTIN) 875-125 MG per tablet Take 1 tablet by mouth 2 times daily for 10 days   warfarin (COUMADIN) 2 MG tablet TAKE 2 TABLETS(4 MG) BY MOUTH ONCE  Pt placed on 1L oxygen for sats 88%, sats 95-96%   DAILY   methotrexate 2.5 MG tablet CHEMO Take 4 tablets (10 mg) by mouth once a week (Patient taking differently: Take 10 mg by mouth once a week On Fridays)   folic acid (FOLVITE) 1 MG tablet Take 1 tablet (1 mg) by mouth daily   propranolol (INDERAL) 20 MG tablet TAKE 1 TABLET(20 MG) BY MOUTH DAILY   Cholecalciferol (VITAMIN D3 PO) Take 1 tablet by mouth daily Pt unsure of strength   acetaminophen (TYLENOL) 325 MG tablet Take 2 tablets (650 mg) by mouth every 6 hours as needed for mild pain            Discharge Medications:        Review of your medicines      START taking       Dose / Directions    lactobacillus rhamnosus (GG) capsule        Dose:  1 capsule   Take 1 capsule by mouth 3 times daily (before meals)   Quantity:  30 capsule   Refills:  0         CONTINUE these medicines which may have CHANGED, or have new prescriptions. If we are uncertain of the size of tablets/capsules you have at home, strength may be listed as something that might have changed.       Dose / Directions    methotrexate 2.5 MG tablet CHEMO   This may have changed:  additional instructions   Used for:  Wegener's granulomatosis (H), Granulomatosis with polyangiitis (H), Long-term use of immunosuppressant medication, Primary osteoarthritis involving multiple joints        Dose:  10 mg   Take 4 tablets (10 mg) by mouth once a week   Quantity:  48 tablet   Refills:  1         CONTINUE these medicines which have NOT CHANGED       Dose / Directions    acetaminophen 325 MG tablet   Commonly known as:  TYLENOL   Used for:  Migraines, Pulmonary emboli (H)        Dose:  650 mg   Take 2 tablets (650 mg) by mouth every 6 hours as needed for mild pain   Quantity:  100 tablet   Refills:  0       amoxicillin-clavulanate 875-125 MG per tablet   Commonly known as:  AUGMENTIN   Used for:  Cellulitis of leg, left        Dose:  1 tablet   Take 1 tablet by mouth 2 times daily for 10 days   Quantity:  20 tablet   Refills:  0       CALCIUM CARBONATE PO         Dose:  1 tablet   Take 1 tablet by mouth 2 times daily Pt unsure of strength   Refills:  0       folic acid 1 MG tablet   Commonly known as:  FOLVITE   Used for:  Granulomatosis with polyangiitis (H), Long-term use of immunosuppressant medication, Primary osteoarthritis involving multiple joints, Wegener's granulomatosis (H)        Dose:  1 mg   Take 1 tablet (1 mg) by mouth daily   Quantity:  90 tablet   Refills:  3       propranolol 20 MG tablet   Commonly known as:  INDERAL   Used for:  Migraine without status migrainosus, not intractable, unspecified migraine type        TAKE 1 TABLET(20 MG) BY MOUTH DAILY   Quantity:  90 tablet   Refills:  1       VITAMIN D3 PO        Dose:  1 tablet   Take 1 tablet by mouth daily Pt unsure of strength   Refills:  0       warfarin 2 MG tablet   Commonly known as:  COUMADIN   Used for:  Chronic anticoagulation        TAKE 2 TABLETS(4 MG) BY MOUTH ONCE DAILY   Quantity:  60 tablet   Refills:  0            Where to get your medicines      Some of these will need a paper prescription and others can be bought over the counter. Ask your nurse if you have questions.     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     Has been cat-sitting for neighbor, at ~11:30 AM yesterday, cat was under the bed and lunged out and  clawed left leg, unsure if bitten. Cat is indoor cat, family verified up to date on vaccinations. She went to the urgent care that night and was prescribed augmentin, did not  prescription until following (7/27) morning and took one dose at that time. Over course of day left leg became more swollen and erythematous, having pain when walking around wound, not in joints. No fever measured, did have chills.  No cough, no nausea/vomiting/diarrhea, no abdominal pain. Able to move foot and all toes, no joint swelling or discomfort. No other symptoms reported.     Reports Wegener's involves lungs but no known kidney involvement.  MTX dosing is once a week on Thursdays.  Has never had infection or needed antibiotics while on MTX.          Hospital Course/Discharge Plan by Problem:      ## LLE cellulitis secondary to cat scratch/bite.  Patient was started on broad spectrum antibiotics initially (vancomycin and zosyn).  The morning after admission these were deescalated to Unasyn IV which was continued for 48 hours and then then changed to oral Augmentin to complete 10 days course (end date 8/7/17 AM; patient has Augmentin at home from Urgent care). The patient exhibited significant improvement while in the hospital and the erythema, swelling, and pain all improved.  She was given a tetanus shot during her admission as well.  Non-adherent dressings wrapped in kerlex were applied over open wounds. Patient was advised to change dressing daily and to follow up with her primary care physician in 2-3 days after discharge.     ## Feroz syndrome associated with Vancomycin treatment.   Patient had an adverse reaction to the vancomycin, developing feroz syndrome.  Infusion was stopped and she was given benadryl and zantac which alleviated her symptoms.  Vancomycin infusion was then resumed at slower rate.  For next dose of vancomycin the patient was pre-medicated with benadryl and zantac and infusion was slow but she one  again had adverse reaction including red skin and significant itching.  Antibiotics were changed and no additional symptoms were encountered.    ## History or recurent DVT/PE on coumadin  - Continued coumadin while hospitalized, goal INR 2-3     ## Min's Granulomatosis, in remission  Discussed methotrexate with Dr. Maguire, patient's rheumatologist and agrees to holding weekly methotrexate (currently weekly doses on fridays) while on antibiotics for active infection (upt to 2 weeks).         Final Day of Progress before Discharge:         Interval History:  General:  feels better   Vitals: vitals signs have been stable and no fever   Intake/Output: stable intake and output and weight unchanged   Nutrition: regular diet   Mental status: mental status unchanged, alert and oriented and responding appropriately   Respiratory: Normal respiratory effort   Cardiovascular no chest pain, no palpitations, blood pressure stable and heart rate stable   Renal: no change in renal function or urinary output   Neurologic: no focal deficits reported, no changes reported since previous exam and no changes in the cranial nerves   Medications: Please see changes in medication as stated above   Interventions: No interventions performed since the last visit   Consults: No consultations were requested since the last visit             Physical Exam:  Vitals were reviewed  Temp: 97.6  F (36.4  C) Temp src: Oral BP: 124/64 Pulse: 81   Resp: 16 SpO2: 93 % O2 Device: None (Room air)      Gen: NAD, AAOx3  CVS: RRR, S1/S2 normal, no murmurs  Resp: lungs clear to auscultation bilaterally     MSK: left distal leg and ankle with improving cellulitis: redness and swelling significantly improved with minimal oozing form the wound. Dressing is dry.  Mild tenderness with manipulation of dressing. Antalgic gait.     Hematologic / Lymphatic: no inguinal lymphadenopathy         Data:  All laboratory data reviewed  All cardiac studies reviewed by  me.  All imaging studies reviewed by me.         Pending Results:   None      It was a pleasure to participate in the care of Nruy Cárdenas.  If you have questions about her care, please do not hesitate to contact the [facility] team via the hospital becerra on which we are stationed.      MD [facility]  [facility] - [phone]

## 2018-01-28 NOTE — IP AVS SNAPSHOT
` ` Patient Information     Patient Name Sex     Suad Sloan ([id number]) Male 1930       Room Bed    [facility] 0621-      Patient Demographics     Address Phone E-mail Address    [address] [place]  [facility] [address] [phone] (Home)  [phone] (Mobile) *Preferred* [email or web address]      Patient Ethnicity & Race     Ethnic Group Patient Race    American White      Emergency Contact(s)     Name Relation Home Work Mobile    Nilam Sloan Spouse [phone]    Alma Dawson Daughter [phone]      Documents on File        Status Date Received Description       Documents for the Patient    Insurance Card  () 04     Face Sheet Accepted () 10/09/11     Insurance Card Received () 10/09/11     Insurance Card  () 07     External Medication Information Consent Accepted () 09     Face Sheet Received () 10/21/09     Face Sheet Received () 06/22/10     Consent for Services - Hospital/Clinic Received () 10/21/10     Insurance Card Received () 11     External Medication Information Consent Accepted () 11     Consent for Services - Hospital/Clinic Received () 11     Advance Directives and Living Will Received 16 Health Care Directive 2001    Patient ID Received 14 DL    Insurance Card Received () 10/09/11     External Medication Information Consent Accepted () 12     Consent for Services - Hospital/Clinic Received () 12     Consent for Services - Hospital/Clinic Received () 12     Consent for Services - Hospital/Clinic  ()      Consent for Services - Hospital/Clinic  ()      Insurance Card Received () 12     Insurance Card Received () 10/25/12     Insurance Card Received () 14 [employer] 3/2/16 Pt didn't have new card nkf     Privacy Notice - [place] Received 13     Waiver - Payment Received 13     External Medication Information Consent Accepted 13     Consent for EHR Access  13 Copied from existing Consent for services - C/HOD collected on 2012    [facility] Specified Other       Consent for Services - Hospital/Clinic Received () 13     Consent for Services - Hospital/Clinic  () 13 CONSENT FOR SERVICE    Insurance Card Received 14 Medical Benefits    Consent to Communicate Received 13     Consent for Services - Hospital/Clinic Received () 14     HIM NAHEED Authorization  03/27/15     Patient ID Received 10/12/16 [place] EXP 2017    Insurance Card Received 04/12/15 Dept of Labor Med Benefits    HIM NAHEED Authorization  05/12/15     HIM NAHEED Authorization  06/23/15     Business/Insurance/Care Coordination/Health Form - Patient   Mn Query Report 14-07/27/15    HIM NAHEED Authorization  08/27/15     Consent for Services - Hospital/Clinic Received () 10/01/15     Consent to Communicate Received 10/22/15 Auth to Discuss PHI    Consent for Services - Hospital/Clinic Received () 12/16/15     Business/Insurance/Care Coordination/Health Form - Patient  16 ARASH, PRIOR AUTH APPROVAL PT SERVICES 11/5/15    Consent for Services/Privacy Notice - Hospital/Clinic Received () 16     Insurance Card Received 16 ARE    Insurance Card Received 16 [facility] 2016    Business/Insurance/Care Coordination/Health Form - Patient  16 DISABILITY PARKING CERTIFICATE  16    Consent for Services - Geriatrics Received 16     Advance Directives and Living Will Received 16 POLST 2016     Power of  Not Received  Statutory Short Form Power of   2016    Advance Directives and Living Will Not Received  VALIDATION OF AD 2001    Advance Directives and Living Will Received 16 POLST 2016     HIM NAHEED Authorization  () 17 Authorization for batch [employer] 17    Advance Directives and Living Will Received 17 POLST 2016     HIM NAHEED Authorization  () 17 Authorization for batch [employer] 1003    Care Everywhere Prospective Auth Received 18     Consent for Services/Privacy Notice - Hospital/Clinic Received 18     Privacy Notice - [place] Received (Deleted) 04     Patient ID  (Deleted) 10/01/10     Consent for Services/Privacy Notice - Hospital/Clinic  (Deleted)      Power of  Received (Deleted) 16     Advance Directives and Living Will Received (Deleted) 16     Advance Directives and Living Will Not Received (Deleted)  VALIDATION OF AD 2001    Advance Directives and Living Will Not Received (Deleted)  to be deleted       Documents for the Encounter    CMS IM for Patient Signature Received 18       Admission Information     Attending Provider Admitting Provider Admission Type Admission Date/Time    Ab Flores MD Loecken, Scott Peter, MD Emergency 18  0911    Discharge Date Hospital Service Auth/Cert Status Service Area     General Medicine Incomplete [facility] SERVICES    Unit Room/Bed Admission Status       [facility] 21/0621- Admission (Confirmed)       Admission     Complaint    Pneumonia      Hospital Account     Name Acct ID Class Status Primary Coverage    Suad Sloan [id number] Inpatient Open [facility]/ [employer]            Guarantor Account (for Hospital Account #[id number])     Name Relation to Pt Service Area Active? Acct Type    Suad Sloan  FCS Yes Personal/Family    Address Phone          [facility]  [address] [facility] [place] [address] [phone](H)              Coverage Information (for Hospital Account #[id number])     F/O Payor/Plan Precert #    [facility] [employer]     Subscriber Subscriber Khoa Sloan  Suad MOLINA [id number]    Address Phone    [address]  [place] [id number] [phone]

## 2018-01-28 NOTE — ED PROVIDER NOTES
History     Chief Complaint:  Shortness of Breath and Cough    HPI   Suad Sloan is a 87 year old male with a history of left hip fracture, COPD, pulmonary hypertension, atrial fibrillation on anticoagulation, and CKD, whowho presents to the emergency department today for evaluation for shortness of breath and cough. EMS reports the patient is coming from an assisted living facility for concerns for shortness of breath and altered mental status last night, prompting him to come into the emergency department. Per EMS, the patient has had a productive cough for months. The patient reports he has some throat pain and his productive cough is getting worse. The patient also endorses having some dysuria over the past few days. Here in the emergency department, had difficulty recalling the current president. He denies chest pain, abdominal pain, bloody stool.     Allergies:  Ace Inhibitors  Cleocin  Dulera  Erythromycin  Hydralazine  Imdur [Isosorbide]  Methadone  Penicillins  Spiriva Handihaler    Medications:    PREDNISONE PO  senna-docusate (SENOKOT-S;PERICOLACE) 8.6-50 MG per tablet  WARFARIN SODIUM PO  HYDROmorphone HCl (DILAUDID PO)  acetaminophen (TYLENOL) 325 MG tablet  Potassium Chloride ER 20 MEQ TBCR  furosemide (LASIX) 20 MG tablet  HydrOXYzine Pamoate (VISTARIL PO)  sennosides (SENOKOT) 8.6 MG tablet  METFORMIN HCL PO  Gabapentin (NEURONTIN PO)  DULOXETINE HCL PO  ferrous sulfate (IRON) 325 (65 FE) MG tablet  omeprazole (PRILOSEC) 40 MG capsule  carvedilol (COREG) 12.5 MG tablet  tamsulosin (FLOMAX) 0.4 MG 24 hr capsule    Past Medical History:    AAA (abdominal aortic aneurysm)    Alcohol dependence    Anxiety   Arthritis of hands   Atrial fibrillation    Bladder cancer    Cardiomyopathy    Chronic pain   Chronic systolic congestive heart failure    CKD (chronic kidney disease) stage 3, GFR 30-59 ml/min   Closed fracture of neck of left femur    Colon cancer    COPD, moderate   Coronary  atherosclerosis   Depression   Diverticulosis of colon (without mention of hemorrhage)   GI bleeding   Hyperlipidemia   Hypertension   Hypoxia   Iliac artery aneurysm, left    Iron deficiency anemia   Left leg cellulitis   Malignant neoplasm of prostate    Peripheral neuropathy   Protein deficiency   Pulmonary hypertension   Respiratory failure   Restless leg syndrome   Sleep apnea    Tachy-sarah syndrome    Type 2 diabetes mellitus    Vertigo     Past Surgical History:    AAA repair / Umbilical hernia repair   APPENDECTOMY OPEN   C FABRIC WRAPPING OF ABDOMINAL ANEURYSM   CATARACT IOL, RT/LT   CATARACT IOL, RT/LT   CHOLECYSTECTOMY   COLON SURGERY   COLONOSCOPY   ENDOVASCULAR REPAIR ANEURYSM ABDOMINAL AORTA   ESOPHAGOSCOPY, GASTROSCOPY, DUODENOSCOPY (EGD), COMBINED   IMPLANT PACEMAKER   lysis of adhesions, repair of incisional hernias     Family History:    Heart Disease  Diabetes  Lung Cancer    Social History:  The patient was alone in the emergency department.   Smoking Status: former  Smokeless Tobacco: never  Alcohol Use: no  Marital Status:       Review of Systems   Respiratory: Positive for cough.    Cardiovascular: Negative for chest pain.   Gastrointestinal: Negative for abdominal pain and blood in stool.   Genitourinary: Positive for dysuria.   Psychiatric/Behavioral: Positive for confusion.   All other systems reviewed and are negative.    Physical Exam   First Vitals: /74  Temp 97.9  F (36.6  C) (Oral)  Resp 22  SpO2 93%    Physical Exam  General:                        Well-nourished                        Speaking in full sentences                        Limited historian  Eyes:                        Conjunctiva without injection or scleral icterus                        PERRL  ENT:                        Dry mucous membranes                        Posterior oropharynx clear without erythema or exudate                        Nares patent                        Pinnae normal  Neck:                         Full ROM                        No stiffness appreciated  Resp:                        Scattered wheezing to lower lung fields                        Coarse breath sounds to left base  CV:                                        Normal rate, regular rhythm                        S1 and S2 present                        No murmur, gallop or rub  GI:                        BS present                        Abdomen soft without distention                        Non-tender to light and deep palpation                        No guarding or rebound tenderness  Skin:                        Warm, dry, well perfused                        No rashes or open wounds on exposed skin  MSK:                        Moves all extremities                        RLE held in slight flexion at hip and externally rotated  Neuro:                        Alert                        Answers questions appropriately                        Moves all extremities equally  Psych:                        Normal affect, normal mood     Emergency Department Course     ECG:  Indication: Abnormal ECG  Completed at 0939.  Read at 0948.   Ventricular-paced rhythm with occasional premature ventricular complexes  Abnormal ECG  Rate 64 bpm. ME interval *. QRS duration 218. QT/QTc 508/524. P-R-T axes * -72 109.    Imaging:  Radiology findings were communicated with the patient who voiced understanding of the findings.    XR Chest 1 View  IMPRESSION: Cardiac silhouette remains enlarged. Left chest wall  pacemaker and pacer wire appear similar. Hazy left midlung opacity  could represent pneumonia versus atelectasis. No significant pleural  effusion or pneumothorax. Atherosclerotic calcifications of the aortic  Arch.  Report per radiology     Laboratory:  Laboratory findings were communicated with the patient who voiced understanding of the findings.  UA with Micro:   Urine Culture:   Blood Culture:  Pertial thromboplastin:   INR: 2.93 (H)  Lactic  Acid: 1.1  CBC: WBC 8.6, HGB 10.2 (L),   CMP: Creatinine 1.87 (H), glucose 175 (H), BUN 49 (H), creatine 1.87 (H),GFR 34 (L),GFR is black 42 (L), albumin 3.1 (L), pritein total 6.7 (L)    Interventions:  0948 Duoneb 2.5 g Nebulization     Emergency Department Course:  Nursing notes and vitals reviewed.  I performed an exam of the patient as documented above.   IV was inserted and blood was drawn for laboratory testing, results above.  The patient was sent for a XR Chest 1 View while in the emergency department, results above.     1111: Patient rechecked and updated.   1144: I spoke with Dr. Flores  regarding patient's presentation, findings, and plan of care.    I personally reviewed the laboratory and imaging results with the Patient and answered all related questions prior to admission.   Findings and plan explained to the Patient who consents to admission. Discussed the patient with Dr. Flores, who will admit the patient to a bed for further monitoring, evaluation, and treatment.    Impression & Plan      Medical Decision Making:  Suad Sloan is an 87 yr old M with a past medical history significant for type 2 diabetes, COPD, chronic kidney disease, and atrial fibrillation on anticoagulation presenting to the ER from his assisted living facility for evaluation of shortness of breath and cough.  Vital signs on presentation notable for oxygen saturation of 87% on room air.  Symptoms are present are most concerning for pneumonia.  Pulmonary exam reveals coarse breath sounds at the left base.  Chest x-ray reveals a hazy left midlung opacity, which could represent pneumonia versus atelectasis though no significant pleural effusion or pneumothorax.  Influenza testing returned negative.  Lactate normal, arguing against severe sepsis or septic shock.  Blood cultures ×2 obtained and are presently pending.  Labs reveal normal WBC count, stable anemia, and renal insufficiency.  Urinalysis without evidence of  infection. Zosyn for HCAP administered.  Patient did not require further noninvasive respiratory support.  Results of the above studies were discussed with the patient and wife present at bedside.  He will be admitted to the hospitalist service for further treatment and care.  Questions answered prior to admission.    Diagnosis:      1. Shortness of breath   2. Hypoxia   3. Chronic anticoagulation   4. Renal insufficiency   5. Pneumonia due to infectious organism, unspecified laterality, unspecified part of lung       Disposition:  Admitted to hospitalist service.   Scribe Disclosure:  Rosalio ELIZABETH, am serving as a scribe at 9:32 AM on 1/28/2018 to document services personally performed by Fabiano Mayers MD based on my observations and the provider's statements to me.     1/28/2018   [facility] EMERGENCY DEPARTMENT       Fabiano Mayers MD  01/28/18 7602

## 2018-01-28 NOTE — ED NOTES
Pt comes from assisted living , assisted living was concerned about pts SOB, pt /o of pain, and altered mental status last night. Pt has no complaints upon triage. Pt has hx of left hip fx that was not repaired. Pt per EMS has had productive cough for months.     ABC intact, pt aware of place, person, birthday, year.

## 2018-01-28 NOTE — IP AVS SNAPSHOT
` `     [facility]TH[facility]: [phone]            Medication Administration Report for Suad Sloan as of 01/30/18 1527   Legend:    Given Hold Not Given Due Canceled Entry Other Actions    Time Time (Time) Time  Time-Action       Inactive    Active    Linked        Medications 01/24/18 01/25/18 01/26/18 01/27/18 01/28/18 01/29/18 01/30/18    acetaminophen (TYLENOL) tablet 650 mg  Dose: 650 mg  Freq: EVERY 4 HOURS PRN Route: PO  PRN Reason: mild pain  Start: 01/28/18 1547   Admin Instructions: Alternate ibuprofen (if ordered) with acetaminophen.  Maximum acetaminophen dose from all sources = 75 mg/kg/day not to exceed 4 grams/day.          2121 (650 mg)-Given            albuterol neb solution 2.5 mg  Dose: 3 mL  Freq: EVERY 2 HOURS PRN Route: NEBULIZATION  PRN Reasons: wheezing,shortness of breath / dyspnea  Start: 01/28/18 1547              carvedilol (COREG) tablet 6.25 mg  Dose: 6.25 mg  Freq: 2 TIMES DAILY WITH MEALS Route: PO  Start: 01/28/18 1800        1733 (6.25 mg)-Given        0845 (6.25 mg)-Given       1824 (6.25 mg)-Given [C]        0817 (6.25 mg)-Given       [ ] 1800           cefTRIAXone IN D5W (ROCEPHIN) intermittent infusion 2 g  Dose: 2 g  Freq: EVERY 24 HOURS Route: IV  Indications of Use: COMMUNITY ACQUIRED PNEUMONIA  Start: 01/28/18 1800   Admin Instructions: FIRST DOSE STAT         1923 (2 g)-New Bag        1730 (2 g)-New Bag        [ ] 1800           doxycycline (VIBRA-TABS) tablet 100 mg  Dose: 100 mg  Freq: EVERY 12 HOURS SCHEDULED Route: PO  Indications of Use: COMMUNITY ACQUIRED PNEUMONIA  Start: 01/30/18 0600   Admin Instructions: Administer at least 2 hours before or after aluminum, calcium, iron, zinc or magnesium containing products.           0525 (100 mg)-Given       [ ] 2000           DULoxetine (CYMBALTA) EC capsule 60 mg  Dose: 60 mg  Freq: AT BEDTIME Route: PO  Start: 01/28/18 2200        2151 (60 mg)-Given        2121 (60 mg)-Given        [ ] 2200            furosemide (LASIX) half-tab 10 mg  Dose: 10 mg  Freq: DAILY Route: PO  Start: 01/29/18 0800         0847 (10 mg)-Given        0817 (10 mg)-Given           gabapentin (NEURONTIN) capsule 200 mg  Dose: 200 mg  Freq: EVERY 12 HOURS Route: PO  Start: 01/28/18 2000        1925 (200 mg)-Given        0846 (200 mg)-Given       2041 (200 mg)-Given        0817 (200 mg)-Given       [ ] 2000           glucose 40 % gel 15-30 g  Dose: 15-30 g  Freq: EVERY 15 MIN PRN Route: PO  PRN Reason: low blood sugar  Start: 01/28/18 1547   Admin Instructions: Give 15 g for BG 51 to 69 mg/dL IF patient is conscious and able to swallow. Give 30 g for BG less than or equal to 50 mg/dL IF patient is conscious and able to swallow. Do NOT give glucose gel via enteral tube.  IF patient has enteral tube: give apple juice 120 mL (4 oz or 15 g of CHO) via enteral tube for BG 51 to 69 mg/dL.  Give apple juice 240 mL (8 oz or 30 g of CHO) via enteral tube for BG less than or equal to 50 mg/dL.    ~Oral gel is preferable for conscious and able to swallow patient.   ~IF gel unavailable or patient refuses may provide apple juice 120 mL (4 oz or 15 g of CHO). Document juice on I and O flowsheet.              Or  dextrose 50 % injection 25-50 mL  Dose: 25-50 mL  Freq: EVERY 15 MIN PRN Route: IV  PRN Reason: low blood sugar  Start: 01/28/18 1547   Admin Instructions: Use if have IV access, BG less than 70 mg/dL and meet dose criteria below:  Dose if conscious and alert (or disorientated) and NPO = 25 mL  Dose if unconscious / not alert = 50 mL  Vesicant. For ordered doses up to 25 mg, give IV Push undiluted. Give each 5g over 1 minute.              Or  glucagon injection 1 mg  Dose: 1 mg  Freq: EVERY 15 MIN PRN Route: SC  PRN Reason: low blood sugar  PRN Comment: May repeat x 1 only  Start: 01/28/18 1547   Admin Instructions: May give SQ or IM. ONLY use glucagon IF patient has NO IV access AND is UNABLE to swallow AND blood glucose is LESS than or EQUAL to 50  mg/dL.  Give IV Push over 1 minute. Reconstitute with 1mL sterile water.               HYDROmorphone (DILAUDID) tablet 2 mg  Dose: 2 mg  Freq: EVERY 3 HOURS Route: PO  Start: 01/28/18 1800   Admin Instructions: Hold if sedated, confused, or asleep         1733 (2 mg)-Given       2151 (2 mg)-Given        0101 (2 mg)-Given       (0330)-Not Given       0606 (2 mg)-Given       0844 (2 mg)-Given       1139 (2 mg)-Given       1539 (2 mg)-Given       1824 (2 mg)-Given       2041 (2 mg)-Given        0021 (2 mg)-Given       0446 (2 mg)-Given       (0755)-Not Given       0817 (2 mg)-Given       1205 (2 mg)-Given       1506 (2 mg)-Given       [ ] 1800       [ ] 2100           hydrOXYzine (ATARAX) tablet 25 mg  Dose: 25 mg  Freq: DAILY PRN Route: PO  PRN Reason: anxiety  Start: 01/28/18 1618              hydrOXYzine (ATARAX) tablet 25 mg  Dose: 25 mg  Freq: 2 TIMES DAILY Route: PO  Indications of Use: ANXIETY ASSOCIATED WITH ORGANIC DISEASE  Start: 01/28/18 2000        1925 (25 mg)-Given        0847 (25 mg)-Given       2041 (25 mg)-Given        0817 (25 mg)-Given       [ ] 2000           insulin aspart (NovoLOG) inj (RAPID ACTING)  Dose: 1-5 Units  Freq: AT BEDTIME Route: SC  Start: 01/28/18 2200   Admin Instructions: MEDIUM INSULIN RESISTANCE DOSING    Do Not give Bedtime Correction Insulin if BG less than  200.   For  - 249 give 1 units.   For  - 299 give 2 units.   For  - 349 give 3 units.   For  -399 give 4 units.   For BG greater than or equal to 400 give 5 units.  Notify provider if glucose greater than or equal to 350 mg/dL after administration of correction dose.  If given at mealtime, must be administered 5 min before meal or immediately after.         2156-Hold [C]        (2119)-Not Given [C]        [ ] 2200           insulin aspart (NovoLOG) inj (RAPID ACTING)  Dose: 1-7 Units  Freq: 3 TIMES DAILY BEFORE MEALS Route: SC  Start: 01/28/18 1700   Admin Instructions: Correction Scale - MEDIUM  INSULIN RESISTANCE DOSING     Do Not give Correction Insulin if Pre-Meal BG less than 140.   For Pre-Meal  - 189 give 1 unit.   For Pre-Meal  - 239 give 2 units.   For Pre-Meal  - 289 give 3 units.   For Pre-Meal  - 339 give 4 units.   For Pre-Meal - 399 give 5 units.   For Pre-Meal -449 give 6 units  For Pre-Meal BG greater than or equal to 450 give 7 units.   To be given with prandial insulin, and based on pre-meal blood glucose.    Notify provider if glucose greater than or equal to 350 mg/dL after administration of correction dose.  If given at mealtime, must be administered 5 min before meal or immediately after.         1743 (3 Units)-Given [C]        0847 (1 Units)-Given       1320 (2 Units)-Given [C]       1834 (2 Units)-Given        (0737)-Not Given       1256 (2 Units)-Given [C]       [ ] 1700           melatonin tablet 1 mg  Dose: 1 mg  Freq: AT BEDTIME PRN Route: PO  PRN Reason: sleep  Start: 01/28/18 1547   Admin Instructions: Do not give unless at least 6 hours of uninterrupted sleep is expected.               naloxone (NARCAN) injection 0.1-0.4 mg  Dose: 0.1-0.4 mg  Freq: EVERY 2 MIN PRN Route: IV  PRN Reason: opioid reversal  Start: 01/28/18 1547   Admin Instructions: For respiratory rate LESS than or EQUAL to 8.  Partial reversal dose:  0.1 mg titrated q 2 minutes for Analgesia Side Effects Monitoring Sedation Level of 3 (frequently drowsy, arousable, drifts to sleep during conversation).Full reversal dose:  0.4 mg bolus for Analgesia Side Effects Monitoring Sedation Level of 4 (somnolent, minimal or no response to stimulation).  For ordered doses up to 2mg give IVP. Give each 0.4mg over 15 seconds in emergency situations. For non-emergent situations further dilute in 9mL of NS to facilitate titration of response.               omeprazole (priLOSEC) CR capsule 40 mg  Dose: 40 mg  Freq: DAILY Route: PO  Start: 01/29/18 0800         0846 (40 mg)-Given        0817  (40 mg)-Given           ondansetron (ZOFRAN-ODT) ODT tab 4 mg  Dose: 4 mg  Freq: EVERY 6 HOURS PRN Route: PO  PRN Reasons: nausea,vomiting  Start: 01/28/18 1547   Admin Instructions: This is Step 1 of nausea and vomiting management.  If nausea not resolved in 15 minutes, go to Step 2 prochlorperazine (COMPAZINE). Do not push through foil backing. Peel back foil and gently remove. Place on tongue immediately. Administration with liquid unnecessary  With dry hands, peel back foil backing and gently remove tablet; do not push oral disintegrating tablet through foil backing; administer immediately on tongue and oral disintegrating tablet dissolves in seconds; then swallow with saliva; liquid not required.              Or  ondansetron (ZOFRAN) injection 4 mg  Dose: 4 mg  Freq: EVERY 6 HOURS PRN Route: IV  PRN Reasons: nausea,vomiting  Start: 01/28/18 1547   Admin Instructions: This is Step 1 of nausea and vomiting management.  If nausea not resolved in 15 minutes, go to Step 2 prochlorperazine (COMPAZINE).  Irritant. For ordered doses up to 4 mg, give IV Push undiluted over 2-5 minutes.               Patient is already receiving anticoagulation with heparin, enoxaparin (LOVENOX), warfarin (COUMADIN)  or other anticoagulant medication  Freq: CONTINUOUS PRN Route: XX  Start: 01/28/18 1547              polyethylene glycol (MIRALAX/GLYCOLAX) Packet 17 g  Dose: 17 g  Freq: DAILY PRN Route: PO  PRN Reason: constipation  Start: 01/28/18 1547   Admin Instructions: Give in 8oz of  water, juice, or soda. Hold for loose stools.  This is the second step of a three step constipation treatment.  1 Packet = 17 grams. Mixed prescribed dose in 8 ounces of water. Follow with 8 oz. of water.               predniSONE (DELTASONE) tablet 5 mg  Dose: 5 mg  Freq: DAILY Route: PO  Start: 01/29/18 0800         0846 (5 mg)-Given        0818 (5 mg)-Given           senna-docusate (SENOKOT-S;PERICOLACE) 8.6-50 MG per tablet 1 tablet  Dose: 1  tablet  Freq: DAILY Route: PO  Start: 01/29/18 0800         0845 (1 tablet)-Given        0817 (1 tablet)-Given           sennosides (SENOKOT) tablet 2 tablet  Dose: 2 tablet  Freq: 2 TIMES DAILY PRN Route: PO  PRN Reason: constipation  Start: 01/28/18 1619              tamsulosin (FLOMAX) capsule 0.4 mg  Dose: 0.4 mg  Freq: DAILY Route: PO  Start: 01/29/18 0800   Admin Instructions: Administer 30 minutes after the same meal each day.  Capsules should be swallowed whole; do not crush chew or open.          0844 (0.4 mg)-Given        0817 (0.4 mg)-Given           Warfarin Therapy Reminder (Check START DATE - warfarin may be starting in the FUTURE)  Dose: 1 each  Freq: CONTINUOUS PRN Route: XX  Start: 01/28/18 1547   Admin Instructions: *Note to reorder warfarin daily*  Pharmacy Warfarin Dosing Service  Patient is on Warfarin Therapy - check for daily order                 Dose: 1 each  Freq: NO DOSE TODAY (WARFARIN) Route: XX  Start: 01/29/18 1329   End: 01/29/18 2328   Admin Instructions: No dose of Warfarin due today per order          2328-Med Discontinued       Future Medications  Medications 01/24/18 01/25/18 01/26/18 01/27/18 01/28/18 01/29/18 01/30/18       warfarin (COUMADIN) tablet 4 mg  Dose: 4 mg  Freq: ONCE AT 6PM Route: PO  Start: 01/30/18 1800          [ ] 1800          Completed Medications  Medications 01/24/18 01/25/18 01/26/18 01/27/18 01/28/18 01/29/18 01/30/18         Dose: 2.5 mg  Freq: ONCE Route: NEBULIZATION  Start: 01/28/18 0932   End: 01/28/18 0948        0948 (2.5 mg)-Given               Dose: 3.375 g  Freq: ONCE Route: IV  Indications of Use: SEPSIS  Indications Comment: Hospital Acquired pneumonia  Last Dose: Stopped (01/28/18 1315)  Start: 01/28/18 1111   End: 01/28/18 1315   Admin Instructions: FIRST DOSE STAT         1220 (3.375 g)-New Bag       1315-Stopped            Discontinued Medications  Medications 01/24/18 01/25/18 01/26/18 01/27/18 01/28/18 01/29/18 01/30/18         Rate: 100  mL/hr   Freq: CONTINUOUS Route: IV  Last Dose: 100 mL/hr (01/29/18 0102)  Start: 01/28/18 1600   End: 01/29/18 0159        1726 ( )-New Bag        0102 (100 mL/hr)-Rate/Dose Verify       0159-Med Discontinued          Dose: 100 mg  Freq: EVERY 12 HOURS Route: IV  Indications of Use: COMMUNITY ACQUIRED PNEUMONIA  Start: 01/28/18 1700   End: 01/30/18 0457   Admin Instructions: FIRST DOSE STAT<br>May switch to oral when taking PO and not in ICU.  Protect from light. Irritant.         1733 (100 mg)-Given        0509 (100 mg)-Given       1830 (100 mg)-Given        0457-Med Discontinued         Dose: 4 mg  Freq: EVERY 6 HOURS PRN Route: PO  PRN Reason: nausea  Start: 01/28/18 1619   End: 01/28/18 1630        1630-Med Discontinued

## 2018-01-29 NOTE — PROGRESS NOTES
[facility]  Hospitalist Progress Note  Ab Flores MD 01/29/2018    Reason for Stay (Diagnosis): PNA, encephalopathy         Assessment and Plan:      Summary of Stay: Suad Sloan is a 87-year-old male with a history of chronic obstructive pulmonary disease, diabetes mellitus, and long-term care resident of [facility].  He presents to the hospital today for increasing confusion with shortness of breath and cough.  On presentation, imaging notable for chest x-ray showing what appears to be left lower lobe infiltrate concerning for pneumonia.  Lab work notable for acute renal failure and chronic kidney disease.  He is being admitted to the hospitalist service.   1.  Pneumonia:  We will treat with Rocephin and doxycycline in the setting of his erythromycin allergy.  He does not appear septic or toxic.  He has no fever and normal white blood cell count with infiltrate on CXR.  He has some shortness of breath and cough with hypoxia, although this is somewhat chronic for him. Influenza antigen negative. procalcitonin level negative.  I would continue antibiotics but do a fairly short course (5-7 days at most)  2.  Acute renal failure on chronic kidney disease:  I suspect related to dehydration.  improved with hydration  3.  Cardiomyopathy history with ejection fraction near 25%:  Appears euvolemic on exam.   4.  Diabetes mellitus,  On oral medications.  metformin being held given renal failure.  Insulin sliding scale as needed.   5.  Encephalopathy:  No known history of cognitive dysfunction or dementia, wife confirmed this on admit.  Increasing confusion for the past few days.  May be infectious related versus dehydration are most likely suspects.  Monitor while in hospital.  No focal findings on exam to suggest a cerebrovascular accident.  ? Improved today  6.  afib hx, s/p pacer:  On chronic warfarin for AC.  In paced rhythm    Code dnr/dni  Ppx:  Warfarin  Dispo:  Back to [facility] in  1-2 days (long term resident there)        Interval History (Subjective):      C/o cough                  Physical Exam:      Last Vital Signs:  /66 (BP Location: Right arm)  Pulse 90  Temp 97.3  F (36.3  C) (Oral)  Resp 16  Wt 75.3 kg (166 lb)  SpO2 98%  BMI 24.51 kg/m2      Intake/Output Summary (Last 24 hours) at 01/29/18 1532  Last data filed at 01/28/18 1800   Gross per 24 hour   Intake              300 ml   Output                0 ml   Net              300 ml       Constitutional: Awake, alert, cooperative, no apparent distress   Respiratory: Course BS B   Cardiovascular: Regular rate and rhythm, normal S1 and S2, and no murmur noted   Abdomen: Normal bowel sounds, soft, non-distended, non-tender   Skin: No rashes, no cyanosis, dry to touch   Neuro: Alert and oriented x3, no weakness, numbness, memory loss   Extremities: No edema, normal range of motion   Other(s):        All other systems: Negative          Medications:      All current medications were reviewed with changes reflected in problem list.         Data:      All new lab and imaging data was reviewed.   Labs:    Recent Labs  Lab 01/29/18  0641   WBC 7.8   HGB 10.3*   HCT 34.9*   MCV 97         Imaging:   No results found for this or any previous visit (from the past 24 hour(s)).

## 2018-01-29 NOTE — PLAN OF CARE
Problem: Patient Care Overview  Goal: Plan of Care/Patient Progress Review  Outcome: No Change  Alert and oriented with some confusion, assist x2 with transfers, Incontinent of B/B.  Lungs course upon auscultation, Congested non-productive cough noted, continue to need a sputum sample. Fluids encouraged, O2 sats 95% and above on 2L. Rest of VSS. Pt to continue IV abx.

## 2018-01-29 NOTE — PLAN OF CARE
Problem: Patient Care Overview  Goal: Plan of Care/Patient Progress Review  Outcome: Improving  A2 c lift, was up in chair for a few hours. Coverage given breakfast and lunch. Denies pain. Incontinent B/B. Nonproductive cough. Still need sputum culture. Plan to possibly dc tomorrow.

## 2018-01-29 NOTE — PLAN OF CARE
"Problem: Patient Care Overview  Goal: Plan of Care/Patient Progress Review  Patient admit at 1500 on 1-28-18 from care facility with c/o SOB and cough. Chest xray shows a pneumonia. Patient getting nebs per RT but sputum needs to be collected yet.  Lung field coarse per posterior auscultation with a NP congested cough. Patient has a pacemaker in place, and is wheelchair bound from previous left hip fracture that was not repaired.  Patient alert, cooperative, and orientated. Is able to fed self but is incontinent of urine. Patient states that they use \" the lift\" to place him in his wheelchair.  O2 per nasal cannula at 4 liters.  Patient on sliding scale of insulin, history of DM. BS at 0200 was 164      "

## 2018-01-29 NOTE — PROGRESS NOTES
SWS     D: Per request for SW to follow for support and discharge planning. Chart reviewed noting pt's admit yesterday due to increased shortness of breathe and coughing, noted hx of COPD (use of o2 PRN)  with also signs reported by staff at his residential facility of increasing confusion. It is noted by SW that pt is a resident of [facility], in speaking with the admissions coordinator there today SW was informed that pt does have a bed hold ( MA ) at this time.      A/P: SW following for support and discharge planning with anticipation of pt's return back to [facility] on discharge. SW assessment to follow.

## 2018-01-30 NOTE — PHARMACY - DISCHARGE MEDICATION RECONCILIATION
Clinical Pharmacy- Warfarin Discharge Note    Patient is discharging on prior to admission dose of warfarin with INR recheck in 2-3 days.    Anticoagulation Dose History     Recent Dosing and Labs Latest Ref Rng & Units 7/8/2016 7/9/2016 7/18/2016 1/28/2018 1/28/2018 1/29/2018 1/30/2018    INR 0.86 - 1.14 4.90(H) 4.04(H) 3.04(H) Canceled, Test credited, specimen discarded 2.93(H) 3.40(H) 2.82(H)    INR 0.86 - 1.14 - - - - - - -

## 2018-01-30 NOTE — PLAN OF CARE
Alert & O x3, occasionally forgetful. VSS. Lift with A2. Cough present, LS coarse with exp wheezes. Taking scheduled dilaudid for pain management. Plan to D/C back to [facility] today, W/C transport set up.

## 2018-01-30 NOTE — PLAN OF CARE
Problem: Patient Care Overview  Goal: Plan of Care/Patient Progress Review  Outcome: Improving  Vitals: /77 (BP Location: Right arm)  Pulse 90  Temp 95.3  F (35.2  C) (Axillary)  Resp 16  Wt 74.7 kg (164 lb 11.2 oz)  SpO2 96%  BMI 24.32 kg/m2 Oxygen: On RA  LOC: Patient alert & orient x2-3, forgetful at times. Sleeping off and on thru-out the shift  Pain: Pain 3 out of 10 in lower legs.Gave scheduled Dilaudid when awake with positive effects as voiced by patient.  Ambulation: Assist of 2 w/lift.  Lungs: On ABX rocephin and doxycycline for PNA, saline locked.  GI/: BS active x4, incontinent of B/B. Large void x1, no BM.  Skin: Bruises, skin tear on LLE has clear Tegaderm, scab on RLE. Skin warm and otherwise intact.  Plan: To dc back to [facility] possibly today pending sputum culture results.   .

## 2018-01-30 NOTE — PROGRESS NOTES
Pt transferred to w/c with Ax2 and lift.  Personal belongings sent with pt.  Pt leaves via [employer] for [facility].

## 2018-01-30 NOTE — CONSULTS
.Care Transition Initial Assessment -   Reason For Consult: discharge planning, per MD pt will be ready for discharge today, back to [facility].   Met with: Patient and  Family    Active Problems:    Pneumonia         DATA  Lives With: facility resident  Living Arrangements: extended care facility  Description of Support System: Supportive, Involved  Who is your support system?: Wife, Children  Support Assessment: Adequate family and caregiver support.        Resources List: Skilled Nursing Facility     Quality Of Family Relationships: supportive, involved  Transportation Available: other (see comments) ([facility], 1/30, stretcher @ 1500)      ASSESSMENT  Cognitive Status:  awake and alert    INTERVENTION    Met with pt and spoke by phone with wife today regarding pt's discharge and return to [facility]. Wife has asked that medical transport be arranged for pt noting that he has been unable since the hip fracture to sit in a straight w/c, the chair he has at [facility] to meet his need for comfort. Discussed with wife that the billing for stretcher transport will go to pt's [facility] however SW unable to guarantee coverage.. If insurance does not cover, the cost of the stretcher transport is $936/base and $23/mi which she acknowledges and accepts. [facility] arranged for 1500 today. SW has spoken to admissions coordinator at [facility] regarding pt's return today.              PLAN    Patient given options and choices for discharge: Yes  Patient/family is agreeable to the plan?  Yes:   Patient anticipates discharging to:  [facility]        Addendum:      D: Pt and wife have asked that transport mode be changed to w/c as pt feels he can sit in w/c for the transport back to [facility]. [facility], w/c available @ 2903, pt and wife have been updated. Discussed with wife that w/c transport will be billed to pt's [facility], if not covered the cost would be $70/base and $4.50/mi  which she acknowledges and accepts.

## 2018-01-30 NOTE — PLAN OF CARE
Problem: Patient Care Overview  Goal: Plan of Care/Patient Progress Review  Outcome: Therapy, progress towards functional goals is fair  Patient alert & orient x2-3, forgetful at times. Sleeping off and on thru-out the shift. No respiratory or cardiac distress noted, denies pain/discomfort presently. Scheduled Dilaudid with positive effects as voiced by patient. (+)CSM BUE/BLE.

## 2018-01-30 NOTE — DISCHARGE SUMMARY
[facility]    Discharge Summary  Hospitalist    Date of Admission:  1/28/2018  Date of Discharge:  1/30/2018  Discharging Provider: Candie Marley MD  Date of Service (when I saw the patient): 01/30/18    Discharge Diagnoses   1.  Pneumonia  2.  Acute renal failure on chronic kidney disease  3.  Cardiomyopathy history with ejection fraction near 25%  4.  Diabetes mellitus type II  5.  Acute metabolic/infectious encephalopathy: Improved  6.  afib hx, s/p pacer:  On chronic warfarin for AC    History of Present Illness   Suad Sloan is an 87 year old male who presented with increasing confusion, shortness of breath and cough.  He was admitted for pneumonia with encephalopathy.  Please see H&P for details    Hospital Course   Summary of Stay: Suad Sloan is a 87-year-old male with a history of chronic obstructive pulmonary disease, diabetes mellitus, and long-term care resident of [facility].  He presents to the hospital today for increasing confusion with shortness of breath and cough. On presentation, imaging notable for chest x-ray showing what appears to be left lower lobe infiltrate concerning for pneumonia. Lab work notable for acute renal failure and chronic kidney disease.  He is being admitted to the hospitalist service.   1.  Pneumonia:  He was treated with Rocephin and doxycycline in the setting of his erythromycin allergy.  He did  not appear septic or toxic on admission.  He has no fever and normal white blood cell count with infiltrate on CXR.  He had some shortness of breath and cough with hypoxia, although this is somewhat chronic for him. Influenza antigen negative. procalcitonin level negative.  Patient had improvement of his symptoms during hospital course.  On discharge he was put on oral doxycycline for 5 more days.  2.  Acute renal failure on chronic kidney disease: I suspect related to dehydration.  improved with hydration  3.  Cardiomyopathy history with  ejection fraction near 25%:  Appears euvolemic on exam.   4.  Diabetes mellitus,  On oral medications.  His metformin was initially held and he was put on isulin sliding scale as needed.  He was continued on metformin on discharge.  5.  Encephalopathy:  No known history of cognitive dysfunction or dementia, wife confirmed this on admit.  Increasing confusion for the past few days.  This was suspected to be secondary to infection versus dehydration. He had no focal findings on exam to suggest a cerebrovascular accident.  His mental status improved during hospital course.  6.  afib hx, s/p pacer:  On chronic warfarin for AC.  In paced rhythm  Patient remained stable.  He was discharged home in stable condition.    Significant Results and Procedures   Results for orders placed or performed during the hospital encounter of 01/28/18   XR Chest 1 View    Narrative    CHEST ONE VIEW  1/28/2018 10:35 AM     HISTORY: Fever.     COMPARISON: 7/4/2016.      Impression    IMPRESSION: Cardiac silhouette remains enlarged. Left chest wall  pacemaker and pacer wire appear similar. Hazy left midlung opacity  could represent pneumonia versus atelectasis. No significant pleural  effusion or pneumothorax. Atherosclerotic calcifications of the aortic  arch.    LORELEI VERNON MD     *Note: Due to a large number of results and/or encounters for the requested time period, some results have not been displayed. A complete set of results can be found in Results Review.         Pending Results   None  Code Status   DNR / DNI       Primary Care Physician   Lizeth Ray        Discharge Disposition   Discharged to home  Condition at discharge: Stable    Consultations This Hospital Stay   PHARMACY TO DOSE WARFARIN    Time Spent on this Encounter   I, aCndie Marley MD, personally saw the patient today and spent greater than 30 minutes discharging this patient.    Discharge Orders     General info for SNF   Length of Stay  Estimate: Short Term Care: Estimated # of Days <30  Condition at Discharge: Stable  Level of care:skilled   Rehabilitation Potential: Fair  Admission H&P remains valid and up-to-date: Yes  Recent Chemotherapy: N/A  Use Nursing Home Standing Orders: Yes     Mantoux instructions   Give two-step Mantoux (PPD) Per Facility Policy Yes     Reason for your hospital stay   Pneumonia     Activity - Up ad jarrod     Follow Up and recommended labs and tests   Follow up with Nursing home physician.  INR check in 2-3 days. Coumadin dosing and INR monitoring per PCP/nursing home physician     Advance Diet as Tolerated   Follow this diet upon discharge: Orders Placed This Encounter     Moderate Consistent CHO Diet       Discharge Medications   Current Discharge Medication List      START taking these medications    Details   doxycycline (VIBRA-TABS) 100 MG tablet Take 1 tablet (100 mg) by mouth every 12 hours for 5 days  Qty: 10 tablet, Refills: 0    Associated Diagnoses: Pneumonia due to infectious organism, unspecified laterality, unspecified part of lung         CONTINUE these medications which have CHANGED    Details   HYDROmorphone (DILAUDID) 2 MG tablet Take 1 tablet (2 mg) by mouth every 3 hours  Qty: 10 tablet, Refills: 0    Associated Diagnoses: Other chronic pain         CONTINUE these medications which have NOT CHANGED    Details   !! albuterol (2.5 MG/3ML) 0.083% neb solution Take 1 vial by nebulization every 4 hours as needed for shortness of breath / dyspnea or wheezing      !! HydrOXYzine Pamoate (VISTARIL PO) Take 25 mg by mouth daily as needed for anxiety      Ondansetron (ZOFRAN ODT PO) Take 4 mg by mouth every 6 hours as needed for nausea      PREDNISONE PO Take 5 mg by mouth daily      !! albuterol (2.5 MG/3ML) 0.083% neb solution Take 1 vial by nebulization 2 times daily AND Q4H PRN      senna-docusate (SENOKOT-S;PERICOLACE) 8.6-50 MG per tablet Take 1 tablet by mouth daily      WARFARIN SODIUM PO Take 4 mg by  mouth At Bedtime       acetaminophen (TYLENOL) 325 MG tablet Take 325-650 mg by mouth every 4 hours as needed for mild pain      Potassium Chloride ER 20 MEQ TBCR Take 1 tablet (20 mEq) by mouth daily  Qty: 90 tablet, Refills: 1    Associated Diagnoses: Hypokalemia      furosemide (LASIX) 20 MG tablet Take 0.5 tablets (10 mg) by mouth daily  Qty: 30 tablet, Refills: prn    Associated Diagnoses: Diastolic congestive heart failure, unspecified congestive heart failure chronicity (H)      !! HydrOXYzine Pamoate (VISTARIL PO) Take 25 mg by mouth 2 times daily       sennosides (SENOKOT) 8.6 MG tablet Take 2 tablets by mouth 2 times daily as needed       METFORMIN HCL PO Take 250 mg by mouth 2 times daily (with meals)      Gabapentin (NEURONTIN PO) Take 200 mg by mouth every 12 hours Reported on 3/27/2017      DULOXETINE HCL PO Take 60 mg by mouth At Bedtime      ferrous sulfate (IRON) 325 (65 FE) MG tablet Take 325 mg by mouth 2 times daily Give with orange juice      omeprazole (PRILOSEC) 40 MG capsule Take 1 capsule (40 mg) by mouth daily Take 30-60 minutes before a meal.  Qty: 90 capsule, Refills: 3    Associated Diagnoses: Acute upper gastrointestinal hemorrhage      carvedilol (COREG) 12.5 MG tablet Take 6.25 mg by mouth 2 times daily (with meals)      albuterol (PROAIR HFA, PROVENTIL HFA, VENTOLIN HFA) 108 (90 BASE) MCG/ACT inhaler Inhale 2 puffs into the lungs 2 times daily as needed for shortness of breath / dyspnea or wheezing      tamsulosin (FLOMAX) 0.4 MG 24 hr capsule TAKE ONE CAPSULE BY MOUTH EVERY DAY  Qty: 90 capsule, Refills: 3    Associated Diagnoses: Malignant neoplasm of prostate (H)       !! - Potential duplicate medications found. Please discuss with provider.        Allergies   Allergies   Allergen Reactions     Ace Inhibitors      hyperkalemia     Cleocin      Severe Heartburn     Dulera      Leg cramps, gas, mouth sores     Erythromycin      upset stomach     Hydralazine      Throat swelling      Imdur [Isosorbide]      Stomach upset     Methadone Other (See Comments)     Became very confused and too sedated     Penicillins Nausea     Spiriva Handihaler      Mouth sores, leg cramps     Data   Most Recent 3 CBC's:  Recent Labs   Lab Test  01/29/18   0641  01/28/18   0927  10/18/17   0640   WBC  7.8  8.6  9.3   HGB  10.3*  10.2*  11.0*   MCV  97  96  88   PLT  162  161  211      Most Recent 3 BMP's:  Recent Labs   Lab Test  01/30/18   0644  01/29/18   0641  01/28/18   0927   NA  140  140  141   POTASSIUM  4.0  4.3  4.9   CHLORIDE  106  106  107   CO2  29  30  28   BUN  38*  45*  49*   CR  1.51*  1.68*  1.87*   ANIONGAP  5  4  6   REGAN  8.5  8.2*  8.7   GLC  139*  149*  175*     Most Recent 2 LFT's:  Recent Labs   Lab Test  01/28/18   0927  07/25/16   0700   AST  19  18   ALT  24  12   ALKPHOS  78  164*   BILITOTAL  1.0  0.5     Most Recent INR's and Anticoagulation Dosing History:  Anticoagulation Dose History     Recent Dosing and Labs Latest Ref Rng & Units 7/8/2016 7/9/2016 7/18/2016 1/28/2018 1/28/2018 1/29/2018 1/30/2018    INR 0.86 - 1.14 4.90(H) 4.04(H) 3.04(H) Canceled, Test credited, specimen discarded 2.93(H) 3.40(H) 2.82(H)    INR 0.86 - 1.14 - - - - - - -        Most Recent 3 Troponin's:  Recent Labs   Lab Test  03/25/16   0415  03/25/16   0005  03/24/16   1738   08/25/14   2228   10/08/11   1553   TROPI  0.115*  0.110*  0.082*   < >   --    < >   --    TROPONIN   --    --    --    --   0.04   --   0.02    < > = values in this interval not displayed.     Most Recent Cholesterol Panel:  Recent Labs   Lab Test  11/16/15   1201   CHOL  100   LDL  45   HDL  41   TRIG  70     Most Recent 6 Bacteria Isolates From Any Culture (See EPIC Reports for Culture Details):  Recent Labs   Lab Test  01/29/18 2128 01/28/18   1015  01/28/18   1002  01/28/18   0933  01/26/18   2300  10/18/17   2130   CULT  Culture in progress  No growth  No growth after 2 days  No growth after 2 days  10,000 to 50,000  colonies/mL  mixed urogenital ravi    >100,000 colonies/mL  Proteus mirabilis  *     Most Recent TSH, T4 and A1c Labs:  Recent Labs   Lab Test  01/28/18   1003   05/06/15   1153   TSH   --    --   0.60   A1C  7.4*   < >   --     < > = values in this interval not displayed.

## 2018-02-01 NOTE — PROGRESS NOTES
"[facility]  PRIMARY CARE PROVIDER AND CLINIC:  SteveLizeth akins Yamilet [address]  / [place] [address]  Chief Complaint   Patient presents with     Hospital F/U       HPI:    Suad Sloan is a 87 year old  (12/23/1930),admitted to the [facility] from Hospital  [facility].  Hospital stay 01/28/2018 through 01/30/2018.  Admitted to this facility for  rehab, medical management and nursing care.  HPI information obtained from: facility chart records.  Current issues are:         Pneumonia  Acute kidney failure (H)  Chronic kidney disease  Type 2 diabetes mellitus (H)  Acute metabolic encephalopathy  Chronic atrial fibrillation (H)     Hospital Course   \"Suad Sloan is a 87-year-old male with a history of chronic obstructive pulmonary disease, diabetes mellitus, and long-term care resident of [facility].  He presents to the hospital today for increasing confusion with shortness of breath and cough. On presentation, imaging notable for chest x-ray showing what appears to be left lower lobe infiltrate concerning for pneumonia. Lab work notable for acute renal failure and chronic kidney disease.  He is being admitted to the hospitalist service.   1.  Pneumonia:  He was treated with Rocephin and doxycycline in the setting of his erythromycin allergy.  He did  not appear septic or toxic on admission.  He has no fever and normal white blood cell count with infiltrate on CXR.  He had some shortness of breath and cough with hypoxia, although this is somewhat chronic for him. Influenza antigen negative. procalcitonin level negative.  Patient had improvement of his symptoms during hospital course.  On discharge he was put on oral doxycycline for 5 more days.  2.  Acute renal failure on chronic kidney disease: I suspect related to dehydration.  improved with hydration  3.  Cardiomyopathy history with ejection fraction near 25%:  Appears euvolemic on " "exam.   4.  Diabetes mellitus,  On oral medications.  His metformin was initially held and he was put on isulin sliding scale as needed.  He was continued on metformin on discharge.  5.  Encephalopathy:  No known history of cognitive dysfunction or dementia, wife confirmed this on admit.  Increasing confusion for the past few days.  This was suspected to be secondary to infection versus dehydration. He had no focal findings on exam to suggest a cerebrovascular accident.  His mental status improved during hospital course.  6.  afib hx, s/p pacer:  On chronic warfarin for AC.  In paced rhythm  Patient remained stable.  He was discharged home in stable condition.\"    ------------------------------    Alert- reports feeling some mild nausea and no appetite. States was feeling good in hospital but since back to LTC has started to feel worse again. Denies emesis. Denies abd pain. Denies SOB, chest pain, fever or chills. Denies pain    Wife called and spoke with writer about her concern that patient seems more confused and at times \"like he passes out\" since back in LTC. In discussion about what was different inpatient both Dilaudid and Metformin came up as being on hold.      CODE STATUS/ADVANCE DIRECTIVES DISCUSSION:   DNR / DNI  Patient's living condition: lives in a skilled nursing facility    ALLERGIES:Ace inhibitors; Cleocin; Dulera; Erythromycin; Hydralazine; Imdur [isosorbide]; Methadone; Penicillins; and Spiriva handihaler  PAST MEDICAL HISTORY:  has a past medical history of AAA (abdominal aortic aneurysm) (H) (9/05); Alcohol dependence (H); Anxiety; Arthritis of hands; Atrial fibrillation (H); Bladder cancer (H) (5/06); Cardiomyopathy (H); Chronic pain; Chronic systolic congestive heart failure (H); CKD (chronic kidney disease) stage 3, GFR 30-59 ml/min; Closed fracture of neck of left femur (H) (9/26/2016); Closed fracture of neck of left femur (H) (9/26/2016); Colon cancer (H) (9/05); COPD, moderate (H); " Coronary atherosclerosis (1995); Depression; Diverticulosis of colon (without mention of hemorrhage); GI bleeding (3/24/2016); Hyperlipidemia; Hypertension; Hypoxia (8/26/2014); Iliac artery aneurysm, left (H); Iron deficiency anemia (7/05); Left leg cellulitis (7/4/2016); Malignant neoplasm of prostate (H) (1994); Peripheral neuropathy; Protein deficiency (H), Albumin2.4  7- (7/25/2016); Pulmonary hypertension; Respiratory failure (H) (12/5/2014); Restless leg syndrome; Sleep apnea (6/10); Sleep apnea; 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  PAST SURGICAL HISTORY:  has a past surgical history that includes Cholecystectomy (1997); Appendectomy open (1970's); Colonoscopy (9/05, 4/07, 2/11); Colon surgery (9/05); AAA repair / Umbilical hernia repair (6/06); cataract iol, rt/lt (Right, 1/09); cataract iol, rt/lt (Left, 2/09); lysis of adhesions, repair of incisional hernias (2/10); Implant pacemaker (09-16-11); FABRIC WRAPPING OF ABDOMINAL ANEURYSM; Endovascular repair aneurysm abdominal aorta (N/A, 12/16/2015); and Esophagoscopy, gastroscopy, duodenoscopy (EGD), combined (N/A, 3/28/2016).  FAMILY HISTORY: family history includes CANCER in his brother and sister; DIABETES in his paternal grandmother; HEART DISEASE (age of onset: 70) in his father; HEART DISEASE (age of onset: 80) in his mother.  SOCIAL HISTORY:  reports that he quit smoking about 7 years ago. His smoking use included Cigarettes. He has a 50.00 pack-year smoking history. He has never used smokeless tobacco. He reports that he does not drink alcohol or use illicit drugs.    Post Discharge Medication Reconciliation Status: discharge medications reconciled and changed, per note/orders (see AVS).  Current Outpatient Prescriptions   Medication Sig Dispense Refill     triamcinolone (KENALOG) 0.1 % cream Apply topically 2 times daily ; apply to both lower extremitie for venous stasis       ammonium lactate  (LAC-HYDRIN) 12 % lotion Apply topically 2 times daily ; apply to feet/LE       clotrimazole (LOTRIMIN) 1 % cream Apply topically 2 times daily       doxycycline (VIBRA-TABS) 100 MG tablet Take 1 tablet (100 mg) by mouth every 12 hours for 5 days 10 tablet 0     HYDROmorphone (DILAUDID) 2 MG tablet Take 1 tablet (2 mg) by mouth every 3 hours 10 tablet 0     albuterol (2.5 MG/3ML) 0.083% neb solution Take 1 vial by nebulization every 4 hours as needed for shortness of breath / dyspnea or wheezing       HydrOXYzine Pamoate (VISTARIL PO) Take 25 mg by mouth daily as needed for anxiety       Ondansetron (ZOFRAN ODT PO) Take 4 mg by mouth every 6 hours as needed for nausea       PREDNISONE PO Take 5 mg by mouth daily       albuterol (2.5 MG/3ML) 0.083% neb solution Take 1 vial by nebulization 2 times daily AND Q4H PRN       senna-docusate (SENOKOT-S;PERICOLACE) 8.6-50 MG per tablet Take 1 tablet by mouth daily       WARFARIN SODIUM PO Take 4 mg by mouth At Bedtime ;  Until 02/05; check INR 02/06       acetaminophen (TYLENOL) 325 MG tablet Take 325-650 mg by mouth every 4 hours as needed for mild pain       Potassium Chloride ER 20 MEQ TBCR Take 1 tablet (20 mEq) by mouth daily 90 tablet 1     furosemide (LASIX) 20 MG tablet Take 0.5 tablets (10 mg) by mouth daily 30 tablet prn     HydrOXYzine Pamoate (VISTARIL PO) Take 25 mg by mouth 2 times daily        sennosides (SENOKOT) 8.6 MG tablet Take 2 tablets by mouth 2 times daily as needed        METFORMIN HCL PO Take 250 mg by mouth 2 times daily (with meals)       Gabapentin (NEURONTIN PO) Take 200 mg by mouth every 12 hours Reported on 3/27/2017       DULOXETINE HCL PO Take 60 mg by mouth At Bedtime       ferrous sulfate (IRON) 325 (65 FE) MG tablet Take 325 mg by mouth 2 times daily Give with orange juice       omeprazole (PRILOSEC) 40 MG capsule Take 1 capsule (40 mg) by mouth daily Take 30-60 minutes before a meal. 90 capsule 3     carvedilol (COREG) 12.5 MG tablet  "Take 6.25 mg by mouth 2 times daily (with meals)       albuterol (PROAIR HFA, PROVENTIL HFA, VENTOLIN HFA) 108 (90 BASE) MCG/ACT inhaler Inhale 2 puffs into the lungs 2 times daily as needed for shortness of breath / dyspnea or wheezing       tamsulosin (FLOMAX) 0.4 MG 24 hr capsule TAKE ONE CAPSULE BY MOUTH EVERY DAY 90 capsule 3       ROS:  4 point ROS including Respiratory, CV, GI and , other than that noted in the HPI,  is negative    Exam:  /82  Pulse 74  Temp 97.6  F (36.4  C)  Resp 16  Ht 5' 9\" (1.753 m)  Wt 163 lb 4.8 oz (74.1 kg)  SpO2 95%  BMI 24.12 kg/m2    GENERAL APPEARANCE: Alert, in no distress   ENT: Mouth and posterior oropharynx normal, moist mucous membranes   EYES: EOM, conjunctivae, lids, pupils and irises normal   NECK: No adenopathy,masses or thyromegaly   RESP: respiratory effort and palpation of chest normal, Lungs clear to auscultation  CV: Palpation and auscultation of heart done , regular rate and rhythm, 2/6 systolic murmur, rub, or gallop, peripheral edema - no edema  ABDOMEN: normal bowel sounds, soft, nontender, no hepatosplenomegaly or other masses   : palpation of bladder WNL   M/S: Gait and station normal   Digits and nails normal - MELENDEZ- weakness  SKIN: Inspection of skin and subcutaneous tissue baseline, Palpation of skin and subcutaneous tissue baseline  NEURO: Cranial nerves 2-12 are normal tested and grossly at patient's baseline, Examination of sensation by touch normal   PSYCH: oriented X 3, affect and mood normal             BP 01/14-01/31: 135-168/65-91 mmHg    Lab/Diagnostic data:    CBC RESULTS:   Recent Labs   Lab Test  01/29/18   0641  01/28/18   0927   WBC  7.8  8.6   RBC  3.59*  3.50*   HGB  10.3*  10.2*   HCT  34.9*  33.6*   MCV  97  96   MCH  28.7  29.1   MCHC  29.5*  30.4*   RDW  15.0  15.1*   PLT  162  161       Last Basic Metabolic Panel:  Recent Labs   Lab Test  01/30/18   0644  01/29/18   0641   NA  140  140   POTASSIUM  4.0  4.3   CHLORIDE  " 106  106   REGAN  8.5  8.2*   CO2  29  30   BUN  38*  45*   CR  1.51*  1.68*   GLC  139*  149*       Liver Function Studies -   Recent Labs   Lab Test  01/28/18   0927  07/25/16   0700   PROTTOTAL  6.7*  7.0   ALBUMIN  3.1*  2.4*   BILITOTAL  1.0  0.5   ALKPHOS  78  164*   AST  19  18   ALT  24  12       Lab Results   Component Value Date    A1C 7.4 01/28/2018    A1C 7.8 10/18/2017       ASSESSMENT/PLAN:  Pneumonia  - resolving-   - complete doxycycline course  - follow clinically    Acute kidney failure (H)  Chronic kidney disease  - RONA improved/resolving after hydration  - avoid nephrotoxic medications  - encourage po fluids  - recheck BMP tomorrow    Type 2 diabetes mellitus (H)  Lab Results   Component Value Date    A1C 7.4 01/28/2018    A1C 7.8 10/18/2017    A1C 7.0 04/19/2017    A1C 7.8 10/12/2016    A1C 7.5 07/05/2016     Has been on very low dose Metformin- this was on hold inpatient. ? exac nausea/GI s/s  - will d/c for now and follow BGL    Acute metabolic encephalopathy  - improved inpatient, now noting some intermittent confusion and somnolence  - hold Dilaudid (patient denies pain and discussed with wife)  - encourage and assist with po intake  - follow clinically      Chronic atrial fibrillation (H)  - stable on Coreg and warfarin  - check INR tomorrow given abx  - VS per unit protocol             Electronically signed by:  MARINA Jean-Baptiste CNP

## 2018-02-05 NOTE — PROGRESS NOTES
"[facility]    Chief Complaint   Patient presents with     Nursing Home Acute     INR RESULTS       HPI:    Suad Sloan is a 87 year old  (12/23/1930), who is being seen today for an episodic care visit at [facility].  HPI information obtained from: facility chart records.Today's concern is:  Community acquired pneumonia, unspecified laterality  -competed Doxycyline on 2/4. Denies cough, SOB, fever or chills  Reports some mild nausea, decreased appetite and \"neuropathy\" in legs.       Encephalopathy  - Mentation has waxed and waned since readmit from hosp. Currently alert, aware at [facility]. Changed Dilaudid to prn late last week 2/2 somnolence and confusion.  Wife and patient now stating patient requires sched med d/t chronic pain    Nausea  - Prn Zofran helps. Intake fair      ALLERGIES: Ace inhibitors; Cleocin; Dulera; Erythromycin; Hydralazine; Imdur [isosorbide]; Methadone; Penicillins; and Spiriva handihaler  Past Medical, Surgical, Family and Social History reviewed and updated in [facility].    Current Outpatient Prescriptions   Medication Sig Dispense Refill     HYDROMORPHONE HCL PO Take 1 mg by mouth every 6 hours as needed for moderate to severe pain       guaiFENesin (ROBITUSSIN) 100 MG/5ML SYRP Take 400 mg by mouth every 4 hours as needed for cough       triamcinolone (KENALOG) 0.1 % cream Apply topically 2 times daily ; apply to both lower extremitie for venous stasis       ammonium lactate (LAC-HYDRIN) 12 % lotion Apply topically 2 times daily ; apply to feet/LE       clotrimazole (LOTRIMIN) 1 % cream Apply topically 2 times daily       albuterol (2.5 MG/3ML) 0.083% neb solution Take 1 vial by nebulization every 4 hours as needed for shortness of breath / dyspnea or wheezing       HydrOXYzine Pamoate (VISTARIL PO) Take 25 mg by mouth daily as needed for anxiety       Ondansetron (ZOFRAN ODT PO) Take 4 mg by mouth every 6 hours as needed for nausea       " PREDNISONE PO Take 5 mg by mouth daily       albuterol (2.5 MG/3ML) 0.083% neb solution Take 1 vial by nebulization 2 times daily        senna-docusate (SENOKOT-S;PERICOLACE) 8.6-50 MG per tablet Take 1 tablet by mouth daily       WARFARIN SODIUM PO Take 4 mg by mouth At Bedtime ;  Until 02/05; check INR 02/06       acetaminophen (TYLENOL) 325 MG tablet Take 325-650 mg by mouth every 4 hours as needed for mild pain       Potassium Chloride ER 20 MEQ TBCR Take 1 tablet (20 mEq) by mouth daily 90 tablet 1     furosemide (LASIX) 20 MG tablet Take 0.5 tablets (10 mg) by mouth daily 30 tablet prn     HydrOXYzine Pamoate (VISTARIL PO) Take 25 mg by mouth 2 times daily        sennosides (SENOKOT) 8.6 MG tablet Take 2 tablets by mouth 2 times daily as needed        Gabapentin (NEURONTIN PO) Take 200 mg by mouth every 12 hours Reported on 3/27/2017       DULOXETINE HCL PO Take 60 mg by mouth At Bedtime       ferrous sulfate (IRON) 325 (65 FE) MG tablet Take 325 mg by mouth 2 times daily Give with orange juice       omeprazole (PRILOSEC) 40 MG capsule Take 1 capsule (40 mg) by mouth daily Take 30-60 minutes before a meal. 90 capsule 3     carvedilol (COREG) 12.5 MG tablet Take 6.25 mg by mouth 2 times daily (with meals)       albuterol (PROAIR HFA, PROVENTIL HFA, VENTOLIN HFA) 108 (90 BASE) MCG/ACT inhaler Inhale 2 puffs into the lungs 2 times daily as needed for shortness of breath / dyspnea or wheezing       tamsulosin (FLOMAX) 0.4 MG 24 hr capsule TAKE ONE CAPSULE BY MOUTH EVERY DAY 90 capsule 3     Medications reviewed:  Medications reconciled to facility chart and changes were made to reflect current medications as identified as above med list. Below are the changes that were made:   Medications stopped since last EPIC medication reconciliation:   Medications Discontinued During This Encounter   Medication Reason     HYDROmorphone (DILAUDID) 2 MG tablet Medication Reconciliation Clean Up     METFORMIN HCL PO Medication  "Reconciliation Clean Up       Medications started since last [facility] medication reconciliation:  Orders Placed This Encounter   Medications     HYDROMORPHONE HCL PO     Sig: Take 1 mg by mouth every 6 hours as needed for moderate to severe pain     guaiFENesin (ROBITUSSIN) 100 MG/5ML SYRP     Sig: Take 400 mg by mouth every 4 hours as needed for cough         REVIEW OF SYSTEMS:  4 point ROS including Respiratory, CV, GI and , other than that noted in the HPI,  is negative    Physical Exam:  /82  Pulse 74  Temp 97.6  F (36.4  C)  Resp 16  Ht 5' 9\" (1.753 m)  Wt 163 lb 4.8 oz (74.1 kg)  SpO2 100%  BMI 24.12 kg/m2    Resp: Effort WNL, LSCTA  CV: RRR- no edema  Abd- soft, nontender, BS +  Musc- MELENDEZ- generalized weakness  Psych- alert, calm, pleasant       01/14-01/31: 135-168/65-91 mmHg    Lab Results   Component Value Date    INR 2.82 01/30/2018    INR 3.40 01/29/2018    INR 2.93 01/28/2018         Recent Labs:     CBC RESULTS:   Recent Labs   Lab Test  02/02/18   0655  01/29/18   0641   WBC  9.2  7.8   RBC  3.76*  3.59*   HGB  11.0*  10.3*   HCT  35.2*  34.9*   MCV  94  97   MCH  29.3  28.7   MCHC  31.3*  29.5*   RDW  15.7*  15.0   PLT  149*  162       Last Basic Metabolic Panel:  Recent Labs   Lab Test  02/02/18   0655  01/30/18   0644   NA  140  140   POTASSIUM  4.7  4.0   CHLORIDE  105  106   REGAN  8.8  8.5   CO2  29  29   BUN  39*  38*   CR  1.74*  1.51*   GLC  152*  139*       Liver Function Studies -   Recent Labs   Lab Test  01/28/18   0927  07/25/16   0700   PROTTOTAL  6.7*  7.0   ALBUMIN  3.1*  2.4*   BILITOTAL  1.0  0.5   ALKPHOS  78  164*   AST  19  18   ALT  24  12       Lab Results   Component Value Date    A1C 7.4 01/28/2018    A1C 7.8 10/18/2017         Assessment/Plan:  Community acquired pneumonia, unspecified laterality  - completed abx. WBC WNL. Afebrile and no resp s/s  - add PT/OT for associated deconditioning  - BMP and CBC tomorrow  - follow VS and weights    Encephalopathy  - " persists- ? Somewhat improved  - fall prec, safety check , push fluids    Nausea  - continue prn Zofran and bowel regimen    Chronic pain-  Will add back reduced dose sched Dilaudid d/t patient and wife concern about chronic pain and patient's h/o of not requesting prn pain medication. Monitor closely for somnolence/confusion- and other ? Side effects.        Electronically signed by  MARINA Jean-Baptiste CNP

## 2018-02-08 NOTE — PROGRESS NOTES
"[place] [facility]    Chief Complaint   Patient presents with     Nursing Home Acute     Confusion       HPI:    Suad Sloan is a 87 year old  (12/23/1930), who is being seen today for an episodic care visit at [facility].  HPI information obtained from: facility chart records.Today's concern is:     Confusion  Community acquired pneumonia, unspecified laterality  Encephalopathy  Nausea  Chronic pain syndrome     Completed course of Doxycycline but with residual confusion/somnolence, nausea and poor intake after return to LTC. Fall from bed last night without injury- patient states was \"restless\" so kicked leg out of bed and then slipped. Had scaled back Dilaudid and stopped Metformin. Some concern for withdrawal 2/2 longstanding use of sched Dilaudid at 2 mg q 3 hours. - Patient is receiving 2nd bag of IV NS. Reports feeling \"better\" today and is able to accurately describe nights events and fall. Denies SOB, chest pain, fever, chills, nausea or abd pain. Denies pain. VSS.     ALLERGIES: Ace inhibitors; Cleocin; Dulera; Erythromycin; Hydralazine; Imdur [isosorbide]; Methadone; Penicillins; and Spiriva handihaler  Past Medical, Surgical, Family and Social History reviewed and updated in [employer].    Current Outpatient Prescriptions   Medication Sig Dispense Refill     HYDROMORPHONE HCL PO Take 1 mg by mouth every 6 hours AND give 1 mg by mouth every 4 hours PRN for pain       triamcinolone (KENALOG) 0.1 % cream Apply topically 2 times daily ; apply to both lower extremitie for venous stasis       ammonium lactate (LAC-HYDRIN) 12 % lotion Apply topically 2 times daily ; apply to feet/LE       clotrimazole (LOTRIMIN) 1 % cream Apply topically 2 times daily       albuterol (2.5 MG/3ML) 0.083% neb solution Take 1 vial by nebulization every 4 hours as needed for shortness of breath / dyspnea or wheezing       HydrOXYzine Pamoate (VISTARIL PO) Take 25 mg by mouth daily as needed for " anxiety       Ondansetron (ZOFRAN ODT PO) Take 4 mg by mouth every 6 hours as needed for nausea       PREDNISONE PO Take 5 mg by mouth daily       albuterol (2.5 MG/3ML) 0.083% neb solution Take 1 vial by nebulization 2 times daily        senna-docusate (SENOKOT-S;PERICOLACE) 8.6-50 MG per tablet Take 1 tablet by mouth daily       WARFARIN SODIUM PO Take 3.5 mg by mouth At Bedtime ;  Until 02/12; check INR 02/13       acetaminophen (TYLENOL) 325 MG tablet Take 325-650 mg by mouth every 4 hours as needed for mild pain       Potassium Chloride ER 20 MEQ TBCR Take 1 tablet (20 mEq) by mouth daily 90 tablet 1     furosemide (LASIX) 20 MG tablet Take 0.5 tablets (10 mg) by mouth daily 30 tablet prn     HydrOXYzine Pamoate (VISTARIL PO) Take 25 mg by mouth 2 times daily        sennosides (SENOKOT) 8.6 MG tablet Take 2 tablets by mouth 2 times daily as needed        Gabapentin (NEURONTIN PO) Take 200 mg by mouth every 12 hours Reported on 3/27/2017       DULOXETINE HCL PO Take 60 mg by mouth At Bedtime       ferrous sulfate (IRON) 325 (65 FE) MG tablet Take 325 mg by mouth 2 times daily Give with orange juice       omeprazole (PRILOSEC) 40 MG capsule Take 1 capsule (40 mg) by mouth daily Take 30-60 minutes before a meal. 90 capsule 3     carvedilol (COREG) 12.5 MG tablet Take 6.25 mg by mouth 2 times daily (with meals)       albuterol (PROAIR HFA, PROVENTIL HFA, VENTOLIN HFA) 108 (90 BASE) MCG/ACT inhaler Inhale 2 puffs into the lungs 2 times daily as needed for shortness of breath / dyspnea or wheezing       tamsulosin (FLOMAX) 0.4 MG 24 hr capsule TAKE ONE CAPSULE BY MOUTH EVERY DAY 90 capsule 3     Medications reviewed:  Medications reconciled to facility chart and changes were made to reflect current medications as identified as above med list. Below are the changes that were made:   Medications stopped since last EPIC medication reconciliation:   There are no discontinued medications.    Medications started since last  "EPIC medication reconciliation:  No orders of the defined types were placed in this encounter.        REVIEW OF SYSTEMS:  4 point ROS including Respiratory, CV, GI and , other than that noted in the HPI,  is negative    Physical   .Exam:  Resp 18  Ht 5' 9\" (1.753 m)  Wt 155 lb (70.3 kg)  SpO2 97%  BMI 22.89 kg/m2     Resp: Effort WNL, LS decreased throughout  CV: VS as above, no edema  Abd- soft, nontender, BS +  Musc- MELENDEZ- w/c  Psych- alert, calm, pleasant        BP 01/31-02/07: 147-168/73-91 mmHg    Recent Labs:     CBC RESULTS:   Recent Labs   Lab Test  02/07/18   0900  02/02/18   0655   WBC  8.6  9.2   RBC  3.94*  3.76*   HGB  11.5*  11.0*   HCT  37.0*  35.2*   MCV  94  94   MCH  29.2  29.3   MCHC  31.1*  31.3*   RDW  16.3*  15.7*   PLT  124*  149*       Last Basic Metabolic Panel:  Recent Labs   Lab Test  02/07/18   0900  02/02/18   0655   NA  139  140   POTASSIUM  5.2  4.7   CHLORIDE  107  105   REGAN  8.5  8.8   CO2  20  29   BUN  39*  39*   CR  1.68*  1.74*   GLC  178*  152*       Liver Function Studies -   Recent Labs   Lab Test  01/28/18   0927  07/25/16   0700   PROTTOTAL  6.7*  7.0   ALBUMIN  3.1*  2.4*   BILITOTAL  1.0  0.5   ALKPHOS  78  164*   AST  19  18   ALT  24  12       Lab Results   Component Value Date    A1C 7.4 01/28/2018    A1C 7.8 10/18/2017         Assessment/Plan:  Confusion  - multifactorial as below    CKD stage 3  - baseline Cr 1.3- 1.5, elevated 1.8 with recent medical issues  - complete 2nd bag IV NS then S/L  - periodic BMP    Community acquired pneumonia, unspecified laterality  - resolving  - completed abx. WBC WNL. Afebrile and no resp s/s  - PT/OT for associated deconditioning  - follow VS and weights     Encephalopathy  - improved after NS and adjustment to Dilaudid, ? 2/2 withdrawal and dehydration  - fall prec, safety check , push fluids     Nausea  - resolving  - continue prn Zofran and bowel regimen     Chronic pain-  Will change dose sched Dilaudid to 1 mg q 6 and 1 mg " q 4 hours prn.     Discussed above POC and status with wife Marie on the phone        Electronically signed by  MARINA Jean-Baptiste CNP

## 2018-02-13 NOTE — PROGRESS NOTES
[place] [facility]    HPI:    Suad Sloan is a 87 year old  (12/23/1930), who is being seen today for an episodic care visit at [facility].   HPI information obtained from: facility chart records. Today's concern is INR/Coumadin management for A. Fib    Bleeding Signs/Symptoms:  None  Thromboembolic Signs/Symptoms:  None    Medication Changes:  No  Dietary Changes:  No  Activity Changes: No  Bacterial/Viral Infection:  No    Missed Coumadin Doses:  None    On ASA: No    Other Concerns:  dietary intake variable of late    OBJECTIVE:    INR Today:  2.9  Current Dose:  3.5mg    ASSESSMENT:     Encounter for therapeutic drug monitoring  Long-term (current) use of anticoagulants  Paroxysmal atrial fibrillation (H)     Supratherapeutic INR for goal of 2-3    PLAN:    New Dose: 3 mg tonight then 3.5 mg daily      Next INR: 1 week        Electronically signed by:  MARINA Jean-Baptiste CNP

## 2018-02-14 NOTE — TELEPHONE ENCOUNTER
Page for general medical status of the patient.  She reported that his cognitive has been declining over week or so.  Today noticed to have visual hallucination and unsteady gait leaning toward the left side.  Was enrolled in hospice but graduated recently.  Vital signs stable    Revised medication list of the form with the nurse notes that the patient has a polypharmacy.    We order the following to stop hydroxyzine  - Stop Dilaudid 1 mg every 6 schedule, continue as needed  - Taper prednisone 5 mg q. other day for 6 days and stop (has been on it since beginning of December 2017 for gout, no complaint)  -Using Zofran (last dose used as needed while on this month)  - perform neuro exam.  - update NP/MD in am  - continue to monitor and call for question.   -Encourage hydration

## 2018-02-15 NOTE — PROGRESS NOTES
[place] [facility]    Chief Complaint   Patient presents with     Nursing Home Acute     Confusion       HPI:    Suad Sloan is a 87 year old  (12/23/1930), who is being seen today for an episodic care visit at [facility].  HPI information obtained from: facility chart records.Today's concern is:     Confusion  Slurred speech-  - intermittent confusion/somnence, muscle twitching and decreased intake for 2/12 weeks now. Improved temporarily with tx pna and hydration but now returned. Patient attempting to get out of bed alone and has had two falls recently. On call MD recently stopped numerous longstanding meds- no improvement noted today      Chronic pain syndrome- writer tapering Dilaudid 2/2 concern for withdrawal d/t longstanding opioid dependence. Is now only on prn hydromorphone    ALLERGIES: Ace inhibitors; Cleocin; Dulera; Erythromycin; Hydralazine; Imdur [isosorbide]; Methadone; Penicillins; and Spiriva handihaler  Past Medical, Surgical, Family and Social History reviewed and updated in [facility].    Current Outpatient Prescriptions   Medication Sig Dispense Refill     HYDROMORPHONE HCL PO Take 1 mg by mouth every 4 hours as needed for moderate to severe pain        triamcinolone (KENALOG) 0.1 % cream Apply topically 2 times daily ; apply to both lower extremitie for venous stasis       ammonium lactate (LAC-HYDRIN) 12 % lotion Apply topically 2 times daily ; apply to feet/LE       clotrimazole (LOTRIMIN) 1 % cream Apply topically 2 times daily       albuterol (2.5 MG/3ML) 0.083% neb solution Take 1 vial by nebulization every 4 hours as needed for shortness of breath / dyspnea or wheezing       PREDNISONE PO Take 5 mg by mouth every other day        albuterol (2.5 MG/3ML) 0.083% neb solution Take 1 vial by nebulization 2 times daily        senna-docusate (SENOKOT-S;PERICOLACE) 8.6-50 MG per tablet Take 1 tablet by mouth daily       WARFARIN SODIUM PO Take 3.5 mg by  "mouth At Bedtime ;  Until 02/19; check INR 02/20       acetaminophen (TYLENOL) 325 MG tablet Take 325-650 mg by mouth every 4 hours as needed for mild pain       Potassium Chloride ER 20 MEQ TBCR Take 1 tablet (20 mEq) by mouth daily 90 tablet 1     furosemide (LASIX) 20 MG tablet Take 0.5 tablets (10 mg) by mouth daily 30 tablet prn     sennosides (SENOKOT) 8.6 MG tablet Take 2 tablets by mouth 2 times daily as needed        Gabapentin (NEURONTIN PO) Take 200 mg by mouth every 12 hours Reported on 3/27/2017       DULOXETINE HCL PO Take 60 mg by mouth At Bedtime       ferrous sulfate (IRON) 325 (65 FE) MG tablet Take 325 mg by mouth 2 times daily Give with orange juice       omeprazole (PRILOSEC) 40 MG capsule Take 1 capsule (40 mg) by mouth daily Take 30-60 minutes before a meal. 90 capsule 3     carvedilol (COREG) 12.5 MG tablet Take 6.25 mg by mouth 2 times daily (with meals)       albuterol (PROAIR HFA, PROVENTIL HFA, VENTOLIN HFA) 108 (90 BASE) MCG/ACT inhaler Inhale 2 puffs into the lungs 2 times daily as needed for shortness of breath / dyspnea or wheezing       tamsulosin (FLOMAX) 0.4 MG 24 hr capsule TAKE ONE CAPSULE BY MOUTH EVERY DAY 90 capsule 3     Medications reviewed:  Medications reconciled to facility chart and changes were made to reflect current medications as identified as above med list. Below are the changes that were made:   Medications stopped since last EPIC medication reconciliation:   Medications Discontinued During This Encounter   Medication Reason     HydrOXYzine Pamoate (VISTARIL PO) Medication Reconciliation Clean Up     HydrOXYzine Pamoate (VISTARIL PO) Medication Reconciliation Clean Up     Ondansetron (ZOFRAN ODT PO) Medication Reconciliation Clean Up       Medications started since last [facility] medication reconciliation:  No orders of the defined types were placed in this encounter.        REVIEW OF SYSTEMS:  Limited secondary to cognitive impairment but today pt reports \"not " "well\"    Physical Exam:  /73  Pulse 64  Ht 5' 9\" (1.753 m)  Wt 155 lb (70.3 kg)  SpO2 95%  BMI 22.89 kg/m2  ENT- tongue deviated to left, some slurred speech  Resp: Effort WNL, LS decreased throughhout  CV: SVSS- afebrile, no edema  Abd- soft, nontender, BS +  Musc- -MELENDEZ, generalized weakness  Psych- alert, confused       BP 02/06-02/14:  136-160/69-87 mmHg    Labs- Reviewed in EPIC                                           Lab Results   Component Value Date    A1C 7.4 01/28/2018    A1C 7.8 10/18/2017       Assessment/Plan:    Confusion  Slurred speech  - ? CVA, delirium, withdrawal, other  - writer discussed assessment and POC with wife Marie today. ? CVA discussed and the fact that have not obtained CT, issue hosp/aggressiveness of med tx/work up discussed. Marie reports interest in comfort care. Patient has been in hospice in past. Marie not interested in pursuing CT scan or rehosp at this time.   - will check CBC/BMP in a..m  - add low dose Seroquel qhs    Chronic pain syndrome  - longstanding opioid dependence  - continue prn hydromorphone  - follow clinically          Electronically signed by  MARINA Jean-Baptiste CNP              "

## 2018-02-16 NOTE — PROGRESS NOTES
[place] [facility]    Chief Complaint   Patient presents with     Confusion       HPI:    Suad Sloan is a 87 year old  (12/23/1930), who is being seen today for an episodic care visit at [facility].  HPI information obtained from: facility chart records.Today's concern is:     Confusion  Slurred speech  - persists despite mx interventions. Labs baseline and without new acute abnormalities.   - today patient alt confusion and wake state with slumber. Received first dose of Seroquel last night. Wife Marie at patient side.    Other chronic pain  - longstanding opioid dependence  - denies pain at this time        Physical debility- requiring max assist with all ADLS. Weight trending down.  History of colon cancer, no staging    ALLERGIES: Ace inhibitors; Cleocin; Dulera; Erythromycin; Hydralazine; Imdur [isosorbide]; Methadone; Penicillins; and Spiriva handihaler  Past Medical, Surgical, Family and Social History reviewed and updated in [facility].    Current Outpatient Prescriptions   Medication Sig Dispense Refill     QUETIAPINE FUMARATE PO Take 25 mg by mouth At Bedtime       HYDROMORPHONE HCL PO Take 1 mg by mouth every 4 hours as needed for moderate to severe pain        triamcinolone (KENALOG) 0.1 % cream Apply topically 2 times daily ; apply to both lower extremitie for venous stasis       ammonium lactate (LAC-HYDRIN) 12 % lotion Apply topically 2 times daily ; apply to feet/LE       clotrimazole (LOTRIMIN) 1 % cream Apply topically 2 times daily       albuterol (2.5 MG/3ML) 0.083% neb solution Take 1 vial by nebulization every 4 hours as needed for shortness of breath / dyspnea or wheezing       PREDNISONE PO Take 5 mg by mouth every other day        albuterol (2.5 MG/3ML) 0.083% neb solution Take 1 vial by nebulization 2 times daily        senna-docusate (SENOKOT-S;PERICOLACE) 8.6-50 MG per tablet Take 1 tablet by mouth daily       WARFARIN SODIUM PO Take 3.5 mg by mouth At  Bedtime ;  Until 02/19; check INR 02/20       acetaminophen (TYLENOL) 325 MG tablet Take 325-650 mg by mouth every 4 hours as needed for mild pain       Potassium Chloride ER 20 MEQ TBCR Take 1 tablet (20 mEq) by mouth daily 90 tablet 1     furosemide (LASIX) 20 MG tablet Take 0.5 tablets (10 mg) by mouth daily 30 tablet prn     sennosides (SENOKOT) 8.6 MG tablet Take 2 tablets by mouth 2 times daily as needed        Gabapentin (NEURONTIN PO) Take 200 mg by mouth every 12 hours Reported on 3/27/2017       DULOXETINE HCL PO Take 60 mg by mouth At Bedtime       ferrous sulfate (IRON) 325 (65 FE) MG tablet Take 325 mg by mouth 2 times daily Give with orange juice       omeprazole (PRILOSEC) 40 MG capsule Take 1 capsule (40 mg) by mouth daily Take 30-60 minutes before a meal. 90 capsule 3     carvedilol (COREG) 12.5 MG tablet Take 6.25 mg by mouth 2 times daily (with meals)       albuterol (PROAIR HFA, PROVENTIL HFA, VENTOLIN HFA) 108 (90 BASE) MCG/ACT inhaler Inhale 2 puffs into the lungs 2 times daily as needed for shortness of breath / dyspnea or wheezing       tamsulosin (FLOMAX) 0.4 MG 24 hr capsule TAKE ONE CAPSULE BY MOUTH EVERY DAY 90 capsule 3     Medications reviewed:  Medications reconciled to facility chart and changes were made to reflect current medications as identified as above med list. Below are the changes that were made:   Medications stopped since last EPIC medication reconciliation:   Medications Discontinued During This Encounter   Medication Reason     HydrOXYzine Pamoate (VISTARIL PO) Medication Reconciliation Clean Up     HydrOXYzine Pamoate (VISTARIL PO) Medication Reconciliation Clean Up     Ondansetron (ZOFRAN ODT PO) Medication Reconciliation Clean Up       Medications started since last [facility] medication reconciliation:  No orders of the defined types were placed in this encounter.        REVIEW OF SYSTEMS:  Limited secondary to cognitive impairment but today pt reports as above and Limited  "secondary to aphasia impairment but today pt reports as above    Physical Exam:  /73  Pulse 64  Temp 97.4  F (36.3  C)  Resp 16  Ht 5' 9\" (1.753 m)  Wt 155 lb (70.3 kg)  SpO2 92%  BMI 22.89 kg/m2       BP 02/06-02/14:  136-160/69-87 mmHg    Recent Labs:   CBC RESULTS:   Recent Labs   Lab Test  02/16/18   0815  02/07/18   0900   WBC  7.0  8.6   RBC  4.05*  3.94*   HGB  11.7*  11.5*   HCT  38.7*  37.0*   MCV  96  94   MCH  28.9  29.2   MCHC  30.2*  31.1*   RDW  16.7*  16.3*   PLT  121*  124*       Last Basic Metabolic Panel:  Recent Labs   Lab Test  02/16/18   0815  02/07/18   0900   NA  144  139   POTASSIUM  4.9  5.2   CHLORIDE  110*  107   REGAN  8.6  8.5   CO2  27  20   BUN  39*  39*   CR  1.51*  1.68*   GLC  169*  178*       Liver Function Studies -   Recent Labs   Lab Test  01/28/18   0927  07/25/16   0700   PROTTOTAL  6.7*  7.0   ALBUMIN  3.1*  2.4*   BILITOTAL  1.0  0.5   ALKPHOS  78  164*   AST  19  18   ALT  24  12     Lab Results   Component Value Date    A1C 7.4 01/28/2018    A1C 7.8 10/18/2017     Assessment/Plan:  Confusion  - ? Etiology. Delirium vs other. Has had some hallucinations, falls.Wife Marie reports desire for comfort care and hospice referral  - continue on Seroquel but decrease dose 2/2 sedative side effects  - HOSPICE REFERRAL    Slurred speech  ? Etiology  - change diet to pureed with NTL as tole    Other chronic pain  - longstanding opioid dependence, forced taper d/t somnolence alt with confusion last couple of weeks. Concern for some associated withdrawal  - continue on prn hydromorphone for now    Physical debility  - - worsening, with weight trending down rapidly 2/2 above and poor intake. Max assist with all ADLs  - HOSPICE REFERRAL    History of colon cancer, no staging  noted  - HOSPICE REFERRAL              Electronically signed by  Lizeth Ray, APRN CNP                "

## 2018-02-23 NOTE — PROGRESS NOTES
Suad Sloan is a 87 year old male seen February 23, 2018 at [facility] where he has resided for 1 and a half years (admit 7/2016) seen to follow up recent hospitalization for pneumonia with encephalopathy.    He was tx'd with ceftriaxone and doxycycline and improved    Seen today in his room, up to high-backed WC.   Speech very difficult to understand, reports he does not feel well but not able to clarify this.   He has had waxing and waning MS since his illness, with several falls.   He is treated for chronic pain, LE neuropathy with longstanding opioid dependence.       Has some dissolving pills on his tongue still, very weak and not swallowing well.  Patient also has cardiomyopathy, with EF 25%.  He has DM2 treated with metformin, and atrial fibrillation for which he has a pacemaker and is anticoagulation.      Past Medical History:   Diagnosis Date     AAA (abdominal aortic aneurysm) (H) 9/05    AAA, Lt Iliac - Dr Abdalla     Alcohol dependence (H)     quit 2003     Anxiety      Arthritis of hands     hands/feet - dr varela     Atrial fibrillation (H)     on coumadin -  Dr Huddleston     Bladder cancer (H) 5/06    dr rodriguez- cyst removed and bx was benign     Cardiomyopathy (H)     EF 35-40% on 12/2014 Echo     Chronic pain      Chronic systolic congestive heart failure (H)     EF 35-40% on 12/2014 Echo     CKD (chronic kidney disease) stage 3, GFR 30-59 ml/min      Closed fracture of neck of left femur (H) 9/26/2016     Closed fracture of neck of left femur (H) 9/26/2016     Colon cancer (H) 9/05    Grade IIA adenoca -Stage 2 T3N0N0 - Dr Felder, Dr Hampton tubular adenoma, 2 cm colon ca mass     COPD, moderate (H)      Coronary atherosclerosis 1995    Dr Huddleston     Depression      Diverticulosis of colon (without mention of hemorrhage)      GI bleeding 3/24/2016     Hyperlipidemia      Hypertension     resolved     Hypoxia 8/26/2014     Iliac artery aneurysm, left (H)      Iron deficiency anemia 7/05  "    Left leg cellulitis 7/4/2016     Malignant neoplasm of prostate (H) 1994    s/p radiation     Peripheral neuropathy      Protein deficiency (H), Albumin2.4  7- 7/25/2016     Pulmonary hypertension      Respiratory failure (H) 12/5/2014     Restless leg syndrome      Sleep apnea 6/10    moderate - auto ASV - dr goltz/darvin     Sleep apnea     moderate - BiPAP 10/5 - with chenye ruffin breathing      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   They have a daughter Alma.     ROS:  Very limited by aphasia    EXAM:  Up to Broda chair, frail  /79  Pulse 68  Temp 97.2  F (36.2  C)  Resp 16  Ht 5' 9\" (1.753 m)  Wt 155 lb (70.3 kg)  SpO2 91%  BMI 22.89 kg/m2   Neck supple without adenopathy  Lungs markedly decreased BS, no wheeze  Heart irreg irreg 2/6 ERICKA  Abd soft, NT, no distention, +BS  Ext without edema  Skin with diffuse purpuric changes, fragile.   Neuro:  Confused,   Psych: affect and content depressed.       Lab Results   Component Value Date    WBC 7.0 02/16/2018      HGB 11.7 02/16/2018      MCV 96 02/16/2018       02/16/2018   Last Basic Metabolic Panel:  Lab Results   Component Value Date     02/16/2018      Lab Results   Component Value Date    POTASSIUM 4.9 02/16/2018     Lab Results   Component Value Date    CHLORIDE 110 02/16/2018     Lab Results   Component Value Date    REGAN 8.6 02/16/2018     Lab Results   Component Value Date    CO2 27 02/16/2018     Lab Results   Component Value Date    BUN 39 02/16/2018     Lab Results   Component Value Date    CR 1.51 02/16/2018   GFR 44  Lab Results   Component Value Date     02/16/2018     Lab Results   Component Value Date    A1C 7.4 01/28/2018       IMP/PLAN:  (M62.81) Generalized muscle weakness   Comment: with confusion, slurred speech   Plan: patient and wife elected Hospice care rather than further workup and intervention.  "     (K92.2) Acute upper gastrointestinal hemorrhage  Comment: no further bleeding and hgb stable   Plan: decrease omeprazole (PRILOSEC) to 20 MG CR capsule once daily and follow for symptoms.   D/c Fe               (I48.0) Paroxysmal atrial fibrillation (H)  Comment: controlled VR on carvedilol  Plan: warfarin per INR for stroke prophylaxis       (I50.22) Chronic systolic congestive heart failure (H)  Comment: a little dry today    Plan:  D/c furosemide and K+, re-treat with diuretics if dyspnea or other symptoms occur    (E11.42) Type 2 diabetes mellitus with diabetic polyneuropathy, without long-term current use of insulin (H)  Comment: not tx'd since hospitalization   Plan: continue gabapentin for neuropathic symptoms.   Trial GDR of duloxetine with goal to wean off.       (N18.3) CKD (chronic kidney disease) stage 3, GFR 30-59 ml/min  Comment: low GFR   Plan: recheck prn        (I10) Hypertension goal BP (blood pressure) < 140/90  Comment:   BP Readings from Last 3 Encounters:   03/02/18 166/87   02/26/18 146/74   02/22/18 145/79      Plan: continue carvedilol       (G89.4) Chronic pain syndrome  Comment: longstanding     Plan: continue Dilaudid with focus on comfort.      Maria Elena Guajardo MD

## 2018-02-26 NOTE — PROGRESS NOTES
"[place] [facility]    Chief Complaint   Patient presents with     Nursing Home Acute     Laceration     ankle       HPI:    Suad Sloan is a 87 year old  (12/23/1930), who is being seen today for an episodic care visit at [facility].  HPI information obtained from: facility chart records.Today's concern is:     Laceration of right ankle, initial encounter  Hospice care patient- patient legs 'jerking\" and hit right medial superior ankle on something on chair causing a laceration - 5cm x 6 mm x 2-3 mm deep. Initially did have some bleeding but later very little. Patient denied associated discomfort. Writer and NM cleaned and approximated tissue and applied SS. Patient tolerated procedure well.     ALLERGIES: Ace inhibitors; Cleocin; Dulera; Erythromycin; Hydralazine; Imdur [isosorbide]; Methadone; Penicillins; and Spiriva handihaler  Past Medical, Surgical, Family and Social History reviewed and updated in EPIC.    Current Outpatient Prescriptions   Medication Sig Dispense Refill     atropine 1 % ophthalmic solution Place 2 drops under the tongue every 2 hours as needed for secretions       acetaminophen (TYLENOL) 650 MG Suppository Place 650 mg rectally every 4 hours as needed for fever or mild pain       bisacodyl (DULCOLAX) 10 MG Suppository Place 10 mg rectally daily as needed for constipation       omeprazole (PRILOSEC) 20 MG CR capsule Take 1 capsule (20 mg) by mouth daily Take 30-60 minutes before a meal.       QUETIAPINE FUMARATE PO Take 12.5 mg by mouth At Bedtime        HYDROMORPHONE HCL PO Take 1 mg by mouth every 4 hours as needed for moderate to severe pain        triamcinolone (KENALOG) 0.1 % cream Apply topically 2 times daily ; apply to both lower extremitie for venous stasis       ammonium lactate (LAC-HYDRIN) 12 % lotion Apply topically 2 times daily ; apply to feet/LE       clotrimazole (LOTRIMIN) 1 % cream Apply topically 2 times daily       albuterol (2.5 " MG/3ML) 0.083% neb solution Take 1 vial by nebulization every 4 hours as needed for shortness of breath / dyspnea or wheezing       albuterol (2.5 MG/3ML) 0.083% neb solution Take 1 vial by nebulization 2 times daily        senna-docusate (SENOKOT-S;PERICOLACE) 8.6-50 MG per tablet Take 1 tablet by mouth daily       WARFARIN SODIUM PO Take 2 mg by mouth At Bedtime ;  Until 02/26; check INR 02/27       acetaminophen (TYLENOL) 325 MG tablet Take 325-650 mg by mouth every 4 hours as needed for mild pain       sennosides (SENOKOT) 8.6 MG tablet Take 2 tablets by mouth 2 times daily as needed        Gabapentin (NEURONTIN PO) Take 200 mg by mouth every 12 hours Reported on 3/27/2017       DULOXETINE HCL PO Take 30 mg by mouth At Bedtime        carvedilol (COREG) 12.5 MG tablet Take 6.25 mg by mouth 2 times daily (with meals)       albuterol (PROAIR HFA, PROVENTIL HFA, VENTOLIN HFA) 108 (90 BASE) MCG/ACT inhaler Inhale 2 puffs into the lungs 2 times daily as needed for shortness of breath / dyspnea or wheezing       tamsulosin (FLOMAX) 0.4 MG 24 hr capsule TAKE ONE CAPSULE BY MOUTH EVERY DAY 90 capsule 3     Medications reviewed:  Medications reconciled to facility chart and changes were made to reflect current medications as identified as above med list. Below are the changes that were made:   Medications stopped since last EPIC medication reconciliation:   There are no discontinued medications.    Medications started since last [facility] medication reconciliation:  Orders Placed This Encounter   Medications     atropine 1 % ophthalmic solution     Sig: Place 2 drops under the tongue every 2 hours as needed for secretions     acetaminophen (TYLENOL) 650 MG Suppository     Sig: Place 650 mg rectally every 4 hours as needed for fever or mild pain     bisacodyl (DULCOLAX) 10 MG Suppository     Sig: Place 10 mg rectally daily as needed for constipation         REVIEW OF SYSTEMS:  Limited secondary to cognitive impairment but today  "pt reports denies pain    Physical Exam:  /74  Pulse 71  Temp 97.5  F (36.4  C)  Resp 18  Ht 5' 9\" (1.753 m)  Wt 155 lb (70.3 kg)  SpO2 93%  BMI 22.89 kg/m2    Resp: Effort WNL  CV: VSS- no edema  Abd- soft, nontender, BS +  Musc- MELENDEZ- w/c  Skin- as above  Psych- alert, calm, pleasant      BP 02/09-02/25: 136-166/69-87 mmHg    Recent Labs:     CBC RESULTS:   Recent Labs   Lab Test  02/16/18   0815  02/07/18   0900   WBC  7.0  8.6   RBC  4.05*  3.94*   HGB  11.7*  11.5*   HCT  38.7*  37.0*   MCV  96  94   MCH  28.9  29.2   MCHC  30.2*  31.1*   RDW  16.7*  16.3*   PLT  121*  124*       Last Basic Metabolic Panel:  Recent Labs   Lab Test  02/16/18   0815  02/07/18   0900   NA  144  139   POTASSIUM  4.9  5.2   CHLORIDE  110*  107   REGAN  8.6  8.5   CO2  27  20   BUN  39*  39*   CR  1.51*  1.68*   GLC  169*  178*       Liver Function Studies -   Recent Labs   Lab Test  01/28/18   0927  07/25/16   0700   PROTTOTAL  6.7*  7.0   ALBUMIN  3.1*  2.4*   BILITOTAL  1.0  0.5   ALKPHOS  78  164*   AST  19  18   ALT  24  12       Lab Results   Component Value Date    A1C 7.4 01/28/2018    A1C 7.8 10/18/2017         Assessment/Plan:  Laceration of ankle  - Monitor right ankle laceration closely/daily- wrap with Kerlix daily. Nsg report any increased drainage, wound dehiscence, or s/s infection. Discuss with hospice getting a new w/c as this one appears too short.    Hospice care patient  - updated hospice    Electronically signed by  Lizeth Ray, MARINA CNP                  "

## 2018-03-01 NOTE — MR AVS SNAPSHOT
After Visit Summary   3/1/2018    Suad Sloan    MRN: [id number]           Patient Information     Date Of Birth          12/23/1930        Visit Information        Provider Department      3/1/2018 4:45 PM CHERI DENIS [facility]        Today's Diagnoses     Cardiac pacemaker in situ    -  1       Follow-ups after your visit        Your next 10 appointments already scheduled     Mar 01, 2018  4:45 PM CST   Remote PPM Check with ALONZO TECH1   [facility]   Sarah ([facility])    [address] [place] [address] [address]  [facility] [address] [phone]           This appointment is for a remote check of your pacemaker.  This is not an appointment at the office.              Who to contact     If you have questions or need follow up information about today's clinic visit or your schedule please contact [facility] directly at [phone].  Normal or non-critical lab and imaging results will be communicated to you by [employer], letter or phone within 4 business days after the clinic has received the results. If you do not hear from us within 7 days, please contact the clinic through [employer] or phone. If you have a critical or abnormal lab result, we will notify you by phone as soon as possible.  Submit refill requests through [email or web address] or call your pharmacy and they will forward the refill request to us. Please allow 3 business days for your refill to be completed.          Additional Information About Your Visit        MyChart Information     [email or web address] gives you secure access to your electronic health record. If you see a primary care provider, you can also send messages to your care team and make appointments. If you have questions, please call your primary care clinic.  If you do not have a primary care provider, please call [phone] and they will assist you.        Care EveryWhere ID     This is  your Care EveryWhere ID. This could be used by other organizations to access your [facility] medical records  [id number]         Blood Pressure from Last 3 Encounters:   02/26/18 146/74   02/22/18 145/79   02/16/18 143/73    Weight from Last 3 Encounters:   02/26/18 70.3 kg (155 lb)   02/22/18 70.3 kg (155 lb)   02/16/18 70.3 kg (155 lb)              We Performed the Following     INTERROGATION DEVICE EVAL REMOTE, PACER/ICD (75143)     PM DEVICE INTERROGATE REMOTE (94233)        Primary Care Provider Office Phone # Fax #    Lizeth Woodard Cory, APRN [phone]       [address] [address]        Equal Access to Services     SAHIL DAVIS : Hadii aad ku hadasho Soomaali, waaxda luqadaha, qaybta kaalmada adeegyada, waxadolfo lu haymarlen pickard . So [facility] [phone].    ATENCIÓN: Si habla español, tiene a lino disposición servicios gratuitos de asistencia lingüística. Marco al [phone].    We comply with applicable federal civil rights laws and [place] laws. We do not discriminate on the basis of race, color, national origin, age, disability, sex, sexual orientation, or gender identity.            Thank you!     Thank you for choosing [facility]  for your care. Our goal is always to provide you with excellent care. Hearing back from our patients is one way we can continue to improve our services. Please take a few minutes to complete the written survey that you may receive in the mail after your visit with us. Thank you!             Your Updated Medication List - Protect others around you: Learn how to safely use, store and throw away your medicines at www.disposemymeds.org.          This list is accurate as of 3/1/18  1:07 PM.  Always use your most recent med list.                   Brand Name Dispense Instructions for use Diagnosis    * acetaminophen 325 MG tablet    TYLENOL     Take 325-650 mg by mouth every 4 hours as  needed for mild pain        * acetaminophen 650 MG Suppository    TYLENOL     Place 650 mg rectally every 4 hours as needed for fever or mild pain        * albuterol 108 (90 BASE) MCG/ACT Inhaler    PROAIR HFA/PROVENTIL HFA/VENTOLIN HFA     Inhale 2 puffs into the lungs 2 times daily as needed for shortness of breath / dyspnea or wheezing        * albuterol (2.5 MG/3ML) 0.083% neb solution      Take 1 vial by nebulization 2 times daily        * albuterol (2.5 MG/3ML) 0.083% neb solution      Take 1 vial by nebulization every 4 hours as needed for shortness of breath / dyspnea or wheezing        ammonium lactate 12 % lotion    LAC-HYDRIN     Apply topically 2 times daily ; apply to feet/LE        atropine 1 % ophthalmic solution      Place 2 drops under the tongue every 2 hours as needed for secretions        bisacodyl 10 MG Suppository    DULCOLAX     Place 10 mg rectally daily as needed for constipation        carvedilol 12.5 MG tablet    COREG     Take 6.25 mg by mouth 2 times daily (with meals)        clotrimazole 1 % cream    LOTRIMIN     Apply topically 2 times daily        DULOXETINE HCL PO      Take 30 mg by mouth At Bedtime        HYDROMORPHONE HCL PO      Take 1 mg by mouth every 4 hours as needed for moderate to severe pain        NEURONTIN PO      Take 200 mg by mouth every 12 hours Reported on 3/27/2017        omeprazole 20 MG CR capsule    priLOSEC     Take 1 capsule (20 mg) by mouth daily Take 30-60 minutes before a meal.    Acute upper gastrointestinal hemorrhage       QUETIAPINE FUMARATE PO      Take 12.5 mg by mouth At Bedtime        senna-docusate 8.6-50 MG per tablet    SENOKOT-S;PERICOLACE     Take 1 tablet by mouth daily        sennosides 8.6 MG tablet    SENOKOT     Take 2 tablets by mouth 2 times daily as needed        tamsulosin 0.4 MG capsule    FLOMAX    90 capsule    TAKE ONE CAPSULE BY MOUTH EVERY DAY    Malignant neoplasm of prostate (H)       triamcinolone 0.1 % cream    KENALOG      Apply topically 2 times daily ; apply to both lower extremitie for venous stasis        WARFARIN SODIUM PO      Take 2 mg by mouth At Bedtime ;  Until 02/26; check INR 02/27        * Notice:  This list has 5 medication(s) that are the same as other medications prescribed for you. Read the directions carefully, and ask your doctor or other care provider to review them with you.

## 2018-03-01 NOTE — PROGRESS NOTES
[employer] Sensia (S) Remote PPM Device Check  : 95 %, Chronic AFIB, taking Warfarin  Mode: VVIR        Presenting Rhythm:   Heart Rate: Adequate rates per histogram  Sensing: Stable    Pacing Threshold: Stable    Impedance: Stable  Battery Status: 2-4.5 years  Atrial Arrhythmia: N/A  Ventricular Arrhythmia: 2 ventricular high rates. Marker only EGMs show irregular VS events lasting 5-8 beats, rates 165- 210bpm. EF 25% (2016). DNR/DNI. Reviewed with HERMANN Saunders.        Care Plan: F/u PPM [facility] q 3 months. Continue with remotes only, patient on hospice. Faxed results to nursing home. AugustinCVT

## 2018-03-02 NOTE — PROGRESS NOTES
[place] [facility]    HPI:    Suad Sloan is a 87 year old  (12/23/1930), who is being seen today for an episodic care visit at [facility].   HPI information obtained from: facility chart records. Today's concern is INR/Coumadin management for A. Fib    Bleeding Signs/Symptoms:  None  Thromboembolic Signs/Symptoms:  None    Medication Changes:  No  Dietary Changes:  No  Activity Changes: No  Bacterial/Viral Infection:  No    Missed Coumadin Doses:  None    On ASA: No    Other Concerns:  dietary intake variable of late    OBJECTIVE:    INR Today:  1.7  Current Dose: 2.5 mg daily    ASSESSMENT:     Encounter for therapeutic drug monitoring  Long-term (current) use of anticoagulants  Paroxysmal atrial fibrillation (H)     Supratherapeutic INR for goal of 2-3    PLAN:    New Dose: 2.5 mg daily    Next INR: 3/6        Electronically signed by:  MARINA Jean-Baptiste CNP

## 2018-03-05 NOTE — PROGRESS NOTES
[facility] now requests orders and shares plan of care/discharge summaries for some patients through [employer].  Please REPLY TO THIS MESSAGE in order to give authorization for orders when needed.  This is considered a verbal order, you will still receive a faxed copy of orders for signature.  Thank you for your assistance in improving collaboration for our patients.    ORDER    1m1    OT to assess new wheelchair to ensure appropriate fit and ability to self propel.    MD SUMMARY/PLAN OF CARE

## 2018-03-08 NOTE — PROGRESS NOTES
SUBJECTIVE:  Hallucinations  Agitation  Somnolence  - wax and wane pattern of lucidity alt with confusion, hallucinations, agitation and then somnolence persists. In hospice now. Discussed with patient wife Marie goals of care/shospice. Is open to looking at some labs to see if something acute can be addressed to improve patient comfort. Also open to increasing Seroquel which has been helpful in reducing hallucinations and confusion since added.    Hospice care patient  - goals of care are comfort/pain control      OBJECTIVE  VS reviewed and stable      Resp: Effort WNL, LS decreased with intermittent congested cough  CV: VSS- no edema  Abd- soft, nontender, BS +  Musc- MELENDEZ  Psych- alt between sleep and wake, thick speech, confused        ASSESSMENT/PLAN:  Hallucinations  Agitation  Somnolence  - increase Seroquel to 25 mg q 12 and 25 mg q 6 hours prn  - continue supportive cares/tx and monitoring  - will check CBC/BMP/LFT tomorrow    Hospice care patient  - cont comfort care goals and hospice    Electronically signed by  MARINA Jean-Baptiste CNP

## 2018-03-10 NOTE — PROGRESS NOTES
"    [place] [facility]    Chief Complaint   Patient presents with     Hallucinations       HPI:    Suad Sloan is a 87 year old  (12/23/1930), who is being seen today for an episodic care visit at [facility].  HPI information obtained from: facility chart records.Today's concern is:     Hallucinations  Agitation  Somnolence  Hospice care patient     Per nursing \"bad night\" last night. Patient agitated, hallucinating and trying to get out of bed. Received prn Seroquel which was helpful so patient sleeping now. Continues this pattern of lucidity alt with confusion, somnolence, hallucinations and agitation. As well as intermittent \"slurred\" or garbled speech. Labs obtained to r/o abnormalities that may be exacerbating issues for patient but are baseline or better.     ALLERGIES: Ace inhibitors; Cleocin; Dulera; Erythromycin; Hydralazine; Imdur [isosorbide]; Methadone; Penicillins; and Spiriva handihaler  Past Medical, Surgical, Family and Social History reviewed and updated in [facility].    Current Outpatient Prescriptions   Medication Sig Dispense Refill     atropine 1 % ophthalmic solution Place 2 drops under the tongue every 2 hours as needed for secretions       acetaminophen (TYLENOL) 650 MG Suppository Place 650 mg rectally every 4 hours as needed for fever or mild pain       bisacodyl (DULCOLAX) 10 MG Suppository Place 10 mg rectally daily as needed for constipation       omeprazole (PRILOSEC) 20 MG CR capsule Take 1 capsule (20 mg) by mouth daily Take 30-60 minutes before a meal.       QUETIAPINE FUMARATE PO Take 12.5 mg by mouth every 12 hours        HYDROMORPHONE HCL PO Take 1 mg by mouth every 4 hours as needed for moderate to severe pain        triamcinolone (KENALOG) 0.1 % cream Apply topically 2 times daily ; apply to both lower extremitie for venous stasis       ammonium lactate (LAC-HYDRIN) 12 % lotion Apply topically 2 times daily ; apply to feet/LE       " "clotrimazole (LOTRIMIN) 1 % cream Apply topically 2 times daily       albuterol (2.5 MG/3ML) 0.083% neb solution Take 1 vial by nebulization every 4 hours as needed for shortness of breath / dyspnea or wheezing       albuterol (2.5 MG/3ML) 0.083% neb solution Take 1 vial by nebulization 2 times daily        senna-docusate (SENOKOT-S;PERICOLACE) 8.6-50 MG per tablet Take 1 tablet by mouth daily       WARFARIN SODIUM PO Take 2.5 mg by mouth At Bedtime ;  Until 03/05; check INR 03/06       acetaminophen (TYLENOL) 325 MG tablet Take 325-650 mg by mouth every 4 hours as needed for mild pain       sennosides (SENOKOT) 8.6 MG tablet Take 2 tablets by mouth 2 times daily as needed        Gabapentin (NEURONTIN PO) Take 300 mg by mouth every 12 hours Reported on 3/27/2017       DULOXETINE HCL PO Take 30 mg by mouth At Bedtime        carvedilol (COREG) 12.5 MG tablet Take 6.25 mg by mouth 2 times daily (with meals)       albuterol (PROAIR HFA, PROVENTIL HFA, VENTOLIN HFA) 108 (90 BASE) MCG/ACT inhaler Inhale 2 puffs into the lungs 2 times daily as needed for shortness of breath / dyspnea or wheezing       tamsulosin (FLOMAX) 0.4 MG 24 hr capsule TAKE ONE CAPSULE BY MOUTH EVERY DAY 90 capsule 3     Medications reviewed:  Medications reconciled to facility chart and changes were made to reflect current medications as identified as above med list. Below are the changes that were made:   Medications stopped since last EPIC medication reconciliation:   There are no discontinued medications.    Medications started since last [facility] medication reconciliation:  No orders of the defined types were placed in this encounter.        REVIEW OF SYSTEMS:  Limited secondary to cognitive impairment but today pt reports - seen again later in day and denies pain and reports \"feeling better\" accurately describes resident related activities today. Reports accurately that wife Marie will not be coming tonight.     Physical Exam:  /87  Pulse 71 " " Temp 97.3  F (36.3  C)  Resp 18  Ht 5' 9\" (1.753 m)  Wt 170 lb (77.1 kg)  SpO2 92%  BMI 25.1 kg/m2    Resp: Effort WNL- occasional congested cough  CV: VS as above, no  edema  Abd- soft, nontender, BS +  Musc- MELENDEZ  Psych- alert, calm, pleasant      BP  02/06-03/01: 136-166/69-87 mmHg    Recent Labs:     Lab Results   Component Value Date    WBC 5.5 03/09/2018     Lab Results   Component Value Date    RBC 3.89 03/09/2018     Lab Results   Component Value Date    HGB 11.1 03/09/2018     Lab Results   Component Value Date    HCT 36.9 03/09/2018     No components found for: MCT  Lab Results   Component Value Date    MCV 95 03/09/2018     Lab Results   Component Value Date    MCH 28.5 03/09/2018     Lab Results   Component Value Date    MCHC 30.1 03/09/2018     Lab Results   Component Value Date    RDW 14.8 03/09/2018     Lab Results   Component Value Date     03/09/2018     Recent Labs   Lab Test  03/09/18   0715  02/16/18   0815   NA  141  144   POTASSIUM  4.5  4.9   CHLORIDE  107  110*   CO2  27  27   ANIONGAP  7  7   GLC  147*  169*   BUN  31*  39*   CR  1.21  1.51*   REGAN  8.4*  8.6     Liver Function Studies -   Recent Labs   Lab Test  03/09/18   0715   PROTTOTAL  6.1*   ALBUMIN  2.6*   BILITOTAL  0.6   ALKPHOS  86   AST  15   ALT  10               Assessment/Plan:  Hallucination  Agitation  Somnolence  ? Psychosis/delirium. Antipsychotics have been very helpful so continue to titrate up in effort to decrease hallucinations and agitation and optimize sleep wake cycle. Will increase HS dose to 37.5 mg and continue on recent increased a.m dose ot 25 mg. As well continue 25 mg q 6 prn for now  - ongoing supportive cares and tx and safety checks    Hospice care patient  - comfort care goals  - continue on prn pain regimen and hospice    Addendum 3/9- wife Marie updated on current status/labs and POC    Electronically signed by  Lizeth Ray, APRN CNP            "

## 2018-03-12 NOTE — PROGRESS NOTES
"[place] [facility]    Chief Complaint   Patient presents with     Hallucinations       HPI:    Suad Sloan is a 87 year old  (12/23/1930), who is being seen today for an episodic care visit at [facility].  HPI information obtained from: facility chart records.Today's concern is:     Hallucinations- per nursing staff including hospice patient is much improved since changes/increase to Seroquel dosing. More lucid and alert, eating well. Sleepy on and off. Patient report \"much better\"    CKD (chronic kidney disease) stage 3, GFR 30-59 ml/min- noted    Neuropathy-  Chronic pain syndrome  - denies pain currently. Continues on Gabapentin and prn Dilaudid  Hospice care patient  - hospice visited this w/e    ALLERGIES: Ace inhibitors; Cleocin; Dulera; Erythromycin; Hydralazine; Imdur [isosorbide]; Methadone; Penicillins; and Spiriva handihaler  Past Medical, Surgical, Family and Social History reviewed and updated in [facility].    Current Outpatient Prescriptions   Medication Sig Dispense Refill     QUETIAPINE FUMARATE PO Take 25 mg by mouth every 6 hours as needed (for hallucinations/confusion)       QUETIAPINE FUMARATE PO Take 37.5 mg by mouth At Bedtime       atropine 1 % ophthalmic solution Place 2 drops under the tongue every 2 hours as needed for secretions       acetaminophen (TYLENOL) 650 MG Suppository Place 650 mg rectally every 4 hours as needed for fever or mild pain       bisacodyl (DULCOLAX) 10 MG Suppository Place 10 mg rectally daily as needed for constipation       omeprazole (PRILOSEC) 20 MG CR capsule Take 1 capsule (20 mg) by mouth daily Take 30-60 minutes before a meal.       QUETIAPINE FUMARATE PO Take 25 mg by mouth every morning        HYDROMORPHONE HCL PO Take 1 mg by mouth every 4 hours as needed for moderate to severe pain        triamcinolone (KENALOG) 0.1 % cream Apply topically 2 times daily ; apply to both lower extremitie for venous stasis       ammonium " lactate (LAC-HYDRIN) 12 % lotion Apply topically 2 times daily ; apply to feet/LE       clotrimazole (LOTRIMIN) 1 % cream Apply topically 2 times daily       albuterol (2.5 MG/3ML) 0.083% neb solution Take 1 vial by nebulization every 4 hours as needed for shortness of breath / dyspnea or wheezing       albuterol (2.5 MG/3ML) 0.083% neb solution Take 1 vial by nebulization 2 times daily        senna-docusate (SENOKOT-S;PERICOLACE) 8.6-50 MG per tablet Take 1 tablet by mouth daily       WARFARIN SODIUM PO Take 2.5 mg by mouth At Bedtime ;  Until 03/12; check INR 03/13       acetaminophen (TYLENOL) 325 MG tablet Take 325-650 mg by mouth every 4 hours as needed for mild pain       sennosides (SENOKOT) 8.6 MG tablet Take 2 tablets by mouth 2 times daily as needed        Gabapentin (NEURONTIN PO) Take 300 mg by mouth every 12 hours Reported on 3/27/2017       DULOXETINE HCL PO Take 30 mg by mouth At Bedtime        carvedilol (COREG) 12.5 MG tablet Take 6.25 mg by mouth 2 times daily (with meals)       albuterol (PROAIR HFA, PROVENTIL HFA, VENTOLIN HFA) 108 (90 BASE) MCG/ACT inhaler Inhale 2 puffs into the lungs 2 times daily as needed for shortness of breath / dyspnea or wheezing       tamsulosin (FLOMAX) 0.4 MG 24 hr capsule TAKE ONE CAPSULE BY MOUTH EVERY DAY 90 capsule 3     Medications reviewed:  Medications reconciled to facility chart and changes were made to reflect current medications as identified as above med list. Below are the changes that were made:   Medications stopped since last EPIC medication reconciliation:   There are no discontinued medications.    Medications started since last [facility] medication reconciliation:  Orders Placed This Encounter   Medications     QUETIAPINE FUMARATE PO     Sig: Take 25 mg by mouth every 6 hours as needed (for hallucinations/confusion)     QUETIAPINE FUMARATE PO     Sig: Take 37.5 mg by mouth At Bedtime         REVIEW OF SYSTEMS:  Limited secondary to cognitive impairment  "but today pt reports as above    Physical Exam:  /87  Pulse 71  Temp 97.3  F (36.3  C)  Resp 18  Ht 5' 9\" (1.753 m)  Wt 170 lb (77.1 kg)  SpO2 93%  BMI 25.1 kg/m2    Resp: Effort WNL  CV: VSS  Abd- soft, nontender, BS +  Musc- MELENDEZ- w/c dependent  Psych- alert, calm, pleasant      BP  02/06-03/01: 136-166/69-87 mmHg    Recent Labs:     CBC RESULTS:   Recent Labs   Lab Test  03/09/18   0715  02/16/18   0815   WBC  5.5  7.0   RBC  3.89*  4.05*   HGB  11.1*  11.7*   HCT  36.9*  38.7*   MCV  95  96   MCH  28.5  28.9   MCHC  30.1*  30.2*   RDW  14.8  16.7*   PLT  127*  121*       Last Basic Metabolic Panel:  Recent Labs   Lab Test  03/09/18   0715  02/16/18   0815   NA  141  144   POTASSIUM  4.5  4.9   CHLORIDE  107  110*   REGAN  8.4*  8.6   CO2  27  27   BUN  31*  39*   CR  1.21  1.51*   GLC  147*  169*       Liver Function Studies -   Recent Labs   Lab Test  03/09/18   0715  01/28/18   0927   PROTTOTAL  6.1*  6.7*   ALBUMIN  2.6*  3.1*   BILITOTAL  0.6  1.0   ALKPHOS  86  78   AST  15  19   ALT  10  24     Lab Results   Component Value Date    A1C 7.4 01/28/2018    A1C 7.8 10/18/2017         Assessment/Plan:  Hallucinations  - improved as above on increased Seroquel  - continue on current dose of Seroquel including prn until dosing optimized  - monitor mood and behaviors closely    CKD (chronic kidney disease) stage 3, GFR 30-59 ml/min  *- noted, dosing and med choice with consideration for    Peripheral Polyneuropathy  Chronic pain syndrome  - stable  - continue on Duloxetine, Gabapentin and prn Dilaudid    Hospice care patient  - comfort care goals, hospice continues to follow      Electronically signed by  Lizeth Ray, MARINA CNP                  "

## 2018-03-20 NOTE — PROGRESS NOTES
[place] [facility]    Chief Complaint   Patient presents with     Nursing Home Acute     Home Care/Hospice       HPI:    Suad Sloan is a 87 year old  (12/23/1930), who is being seen today for an episodic care visit at [facility].  HPI information obtained from: facility chart records.Today's concern is:    Shortness of breath  Generalized edema  Resident with PMH that includes cardiomyopathy EF of 25%, atrial fibrillation, pacemaker, COPD, Type 2 DM, CKD. Seen in conjunction with hospice nurse today as he has complaints of increased shortness of breath. He denies chest pain, nausea, vomiting, he reports shortness of breath above baseline for the past few days. Weight is up  approximately 15 lbs from last month (2/28/18) Today he is wearing supplemental oxygen 2L via nasal cannula, tubi  on bilateral lower extremities with +1 edema in both legs, sacral edema and trace abdominal edema. Previous use of lasix and potassium.    ALLERGIES: Ace inhibitors; Cleocin; Dulera; Erythromycin; Hydralazine; Imdur [isosorbide]; Methadone; Penicillins; and Spiriva handihaler  Past Medical, Surgical, Family and Social History reviewed and updated in [facility].    Current Outpatient Prescriptions   Medication Sig Dispense Refill     QUETIAPINE FUMARATE PO Take 25 mg by mouth every 6 hours as needed (for hallucinations/confusion)       QUETIAPINE FUMARATE PO Take 37.5 mg by mouth At Bedtime       atropine 1 % ophthalmic solution Place 2 drops under the tongue every 2 hours as needed for secretions       acetaminophen (TYLENOL) 650 MG Suppository Place 650 mg rectally every 4 hours as needed for fever or mild pain       bisacodyl (DULCOLAX) 10 MG Suppository Place 10 mg rectally daily as needed for constipation       omeprazole (PRILOSEC) 20 MG CR capsule Take 1 capsule (20 mg) by mouth daily Take 30-60 minutes before a meal.       QUETIAPINE FUMARATE PO Take 25 mg by mouth every morning         HYDROMORPHONE HCL PO Take 1 mg by mouth every 4 hours as needed for moderate to severe pain        triamcinolone (KENALOG) 0.1 % cream Apply topically 2 times daily ; apply to both lower extremitie for venous stasis       ammonium lactate (LAC-HYDRIN) 12 % lotion Apply topically 2 times daily ; apply to feet/LE       clotrimazole (LOTRIMIN) 1 % cream Apply topically 2 times daily       albuterol (2.5 MG/3ML) 0.083% neb solution Take 1 vial by nebulization every 4 hours as needed for shortness of breath / dyspnea or wheezing       albuterol (2.5 MG/3ML) 0.083% neb solution Take 1 vial by nebulization 2 times daily        senna-docusate (SENOKOT-S;PERICOLACE) 8.6-50 MG per tablet Take 1 tablet by mouth daily       WARFARIN SODIUM PO Take 2.5 mg by mouth At Bedtime ;  Until 03/19; check INR 03/20       acetaminophen (TYLENOL) 325 MG tablet Take 325-650 mg by mouth every 4 hours as needed for mild pain       sennosides (SENOKOT) 8.6 MG tablet Take 2 tablets by mouth 2 times daily as needed        Gabapentin (NEURONTIN PO) Take 300 mg by mouth every 12 hours Reported on 3/27/2017       DULOXETINE HCL PO Take 30 mg by mouth At Bedtime        carvedilol (COREG) 12.5 MG tablet Take 6.25 mg by mouth 2 times daily (with meals)       albuterol (PROAIR HFA, PROVENTIL HFA, VENTOLIN HFA) 108 (90 BASE) MCG/ACT inhaler Inhale 2 puffs into the lungs 2 times daily as needed for shortness of breath / dyspnea or wheezing       tamsulosin (FLOMAX) 0.4 MG 24 hr capsule TAKE ONE CAPSULE BY MOUTH EVERY DAY 90 capsule 3     Patient Active Problem List   Diagnosis     Hypertension goal BP (blood pressure) < 140/90     Localized osteoarthritis of hand     Malignant neoplasm of prostate (H)     Overlapping malignant neoplasm of colon (H)     Diverticulosis of large intestine     Neoplasm of bladder     Advance care planning     Health Care Home     CKD (chronic kidney disease) stage 3, GFR 30-59 ml/min     COPD, moderate (H)     Chronic  "foot pain     Chronic hand pain     CAD (coronary artery disease)     Cardiac pacemaker in situ     Peripheral neuropathy     Major depressive disorder, single episode, moderate (H)     Abdominal aortic aneurysm (H)     Cardiomyopathy     Type 2 diabetes mellitus with diabetic chronic kidney disease (H)     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)     Pseudoaneurysm of aorta (H)     Chronic systolic congestive heart failure (H)     Dependence on nicotine from cigarettes,has been smoking again to relax> now nicotine patch     Type 2 diabetes mellitus with diabetic neuropathy (H)     Neuropathy (H), 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     Routine general medical examination at a health care facility, done 7-     Generalized muscle weakness     History of colon cancer, no staging     Osteoarthritis     Paroxysmal atrial fibrillation (H)     Encounter for monitoring coumadin therapy     Closed fracture of neck of left femur (H)     Medication intolerance>> Methadone> very confused     Anxiety disorder, unspecified type     History of acute gouty arthritis     Alcohol dependence in remission (H)     Ventral hernia without obstruction or gangrene     Acute cystitis without hematuria     Pneumonia       Medications reviewed:  Medications reconciled to facility chart and changes were made to reflect current medications as identified as above med list. Below are the changes that were made:   Medications stopped since last EPIC medication reconciliation:   There are no discontinued medications.    Medications started since last [facility] medication reconciliation:  No orders of the defined types were placed in this encounter.    REVIEW OF SYSTEMS:  4 point ROS including Respiratory, CV, GI and , other than that noted in the HPI,  is negative    Physical Exam:  /63  Pulse 64  Temp 98.1  F (36.7  C)  Resp 20  Ht 5' 9\" (1.753 m)  Wt 178 lb 8 oz (81 kg)  SpO2 94%  BMI 26.36 kg/m2  GENERAL APPEARANCE:  Alert, in no " distress  RESP:  diminished breath sounds throughout, distant in posterior fields, coarse anterior  CV:  Palpation and auscultation of heart done , irregular rhythm, edema (see hpi)  ABDOMEN:  rounded slight distention  SKIN:  frail and fragile  PSYCH:  Calm, making superficial conversation     Recent Labs:     CBC RESULTS:   Recent Labs   Lab Test  03/09/18   0715  02/16/18   0815   WBC  5.5  7.0   RBC  3.89*  4.05*   HGB  11.1*  11.7*   HCT  36.9*  38.7*   MCV  95  96   MCH  28.5  28.9   MCHC  30.1*  30.2*   RDW  14.8  16.7*   PLT  127*  121*       Last Basic Metabolic Panel:  Recent Labs   Lab Test  03/09/18   0715  02/16/18   0815   NA  141  144   POTASSIUM  4.5  4.9   CHLORIDE  107  110*   REGAN  8.4*  8.6   CO2  27  27   BUN  31*  39*   CR  1.21  1.51*   GLC  147*  169*       Liver Function Studies -   Recent Labs   Lab Test  03/09/18   0715  01/28/18   0927   PROTTOTAL  6.1*  6.7*   ALBUMIN  2.6*  3.1*   BILITOTAL  0.6  1.0   ALKPHOS  86  78   AST  15  19   ALT  10  24       TSH   Date Value Ref Range Status   05/06/2015 0.60 0.40 - 4.00 mU/L Final   08/07/2014 0.80 0.40 - 4.00 mU/L Final     Comment:     Effective 7/30/2014, the reference range for this assay has changed to reflect   new instrumentation/methodology.         Lab Results   Component Value Date    A1C 7.4 01/28/2018    A1C 7.8 10/18/2017     Assessment/Plan:  (R06.02) Shortness of breath  (primary encounter diagnosis)  (R60.1) Generalized edema  Comment: Acute  Plan:   -Lasix 20 mg po x 2 days and then lasix 10 mg po qd  -Potassium 20 mEq po daily  -weights and vitals weekly on bath days  -monitor for s/s of respiratory  improvement  -discussed BMP for hospice patient to monitor CKD with lasix use.   -BMP 3/27/18 with INR draw  -monitor for end date or ongoing need of lasix use    Electronically signed by  Connor Cervantes, MARINA CNP

## 2018-03-20 NOTE — LETTER
3/20/2018        RE: Suad Sloan  [facility]  [address]  [place] [address]          [place] [facility]    Chief Complaint   Patient presents with     Nursing Home Acute     Home Care/Hospice       HPI:    Suad Sloan is a 87 year old  (12/23/1930), who is being seen today for an episodic care visit at [facility].  HPI information obtained from: facility chart records.Today's concern is:    Shortness of breath  Generalized edema  Resident with PMH that includes cardiomyopathy EF of 25%, atrial fibrillation, pacemaker, COPD, Type 2 DM, CKD. Seen in conjunction with hospice nurse today as he has complaints of increased shortness of breath. He denies chest pain, nausea, vomiting, he reports shortness of breath above baseline for the past few days. Weight is up  approximately 15 lbs from last month (2/28/18) Today he is wearing supplemental oxygen 2L via nasal cannula, tubi  on bilateral lower extremities with +1 edema in both legs, sacral edema and trace abdominal edema. Previous use of lasix and potassium.    ALLERGIES: Ace inhibitors; Cleocin; Dulera; Erythromycin; Hydralazine; Imdur [isosorbide]; Methadone; Penicillins; and Spiriva handihaler  Past Medical, Surgical, Family and Social History reviewed and updated in [facility].    Current Outpatient Prescriptions   Medication Sig Dispense Refill     QUETIAPINE FUMARATE PO Take 25 mg by mouth every 6 hours as needed (for hallucinations/confusion)       QUETIAPINE FUMARATE PO Take 37.5 mg by mouth At Bedtime       atropine 1 % ophthalmic solution Place 2 drops under the tongue every 2 hours as needed for secretions       acetaminophen (TYLENOL) 650 MG Suppository Place 650 mg rectally every 4 hours as needed for fever or mild pain       bisacodyl (DULCOLAX) 10 MG Suppository Place 10 mg rectally daily as needed for constipation       omeprazole (PRILOSEC) 20 MG CR capsule Take 1 capsule (20  mg) by mouth daily Take 30-60 minutes before a meal.       QUETIAPINE FUMARATE PO Take 25 mg by mouth every morning        HYDROMORPHONE HCL PO Take 1 mg by mouth every 4 hours as needed for moderate to severe pain        triamcinolone (KENALOG) 0.1 % cream Apply topically 2 times daily ; apply to both lower extremitie for venous stasis       ammonium lactate (LAC-HYDRIN) 12 % lotion Apply topically 2 times daily ; apply to feet/LE       clotrimazole (LOTRIMIN) 1 % cream Apply topically 2 times daily       albuterol (2.5 MG/3ML) 0.083% neb solution Take 1 vial by nebulization every 4 hours as needed for shortness of breath / dyspnea or wheezing       albuterol (2.5 MG/3ML) 0.083% neb solution Take 1 vial by nebulization 2 times daily        senna-docusate (SENOKOT-S;PERICOLACE) 8.6-50 MG per tablet Take 1 tablet by mouth daily       WARFARIN SODIUM PO Take 2.5 mg by mouth At Bedtime ;  Until 03/19; check INR 03/20       acetaminophen (TYLENOL) 325 MG tablet Take 325-650 mg by mouth every 4 hours as needed for mild pain       sennosides (SENOKOT) 8.6 MG tablet Take 2 tablets by mouth 2 times daily as needed        Gabapentin (NEURONTIN PO) Take 300 mg by mouth every 12 hours Reported on 3/27/2017       DULOXETINE HCL PO Take 30 mg by mouth At Bedtime        carvedilol (COREG) 12.5 MG tablet Take 6.25 mg by mouth 2 times daily (with meals)       albuterol (PROAIR HFA, PROVENTIL HFA, VENTOLIN HFA) 108 (90 BASE) MCG/ACT inhaler Inhale 2 puffs into the lungs 2 times daily as needed for shortness of breath / dyspnea or wheezing       tamsulosin (FLOMAX) 0.4 MG 24 hr capsule TAKE ONE CAPSULE BY MOUTH EVERY DAY 90 capsule 3     Patient Active Problem List   Diagnosis     Hypertension goal BP (blood pressure) < 140/90     Localized osteoarthritis of hand     Malignant neoplasm of prostate (H)     Overlapping malignant neoplasm of colon (H)     Diverticulosis of large intestine     Neoplasm of bladder     Advance care  planning     Health Care Home     CKD (chronic kidney disease) stage 3, GFR 30-59 ml/min     COPD, moderate (H)     Chronic foot pain     Chronic hand pain     CAD (coronary artery disease)     Cardiac pacemaker in situ     Peripheral neuropathy     Major depressive disorder, single episode, moderate (H)     Abdominal aortic aneurysm (H)     Cardiomyopathy     Type 2 diabetes mellitus with diabetic chronic kidney disease (H)     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)     Pseudoaneurysm of aorta (H)     Chronic systolic congestive heart failure (H)     Dependence on nicotine from cigarettes,has been smoking again to relax> now nicotine patch     Type 2 diabetes mellitus with diabetic neuropathy (H)     Neuropathy (H), 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     Routine general medical examination at a health care facility, done 7-     Generalized muscle weakness     History of colon cancer, no staging     Osteoarthritis     Paroxysmal atrial fibrillation (H)     Encounter for monitoring coumadin therapy     Closed fracture of neck of left femur (H)     Medication intolerance>> Methadone> very confused     Anxiety disorder, unspecified type     History of acute gouty arthritis     Alcohol dependence in remission (H)     Ventral hernia without obstruction or gangrene     Acute cystitis without hematuria     Pneumonia       Medications reviewed:  Medications reconciled to facility chart and changes were made to reflect current medications as identified as above med list. Below are the changes that were made:   Medications stopped since last EPIC medication reconciliation:   There are no discontinued medications.    Medications started since last [facility] medication reconciliation:  No orders of the defined types were placed in this encounter.    REVIEW OF SYSTEMS:  4 point ROS including Respiratory, CV, GI and , other than that noted in the HPI,  is negative    Physical Exam:  /63  Pulse 64  Temp 98.1  F (36.7  C)   "Resp 20  Ht 5' 9\" (1.753 m)  Wt 178 lb 8 oz (81 kg)  SpO2 94%  BMI 26.36 kg/m2  GENERAL APPEARANCE:  Alert, in no distress  RESP:  diminished breath sounds throughout, distant in posterior fields, coarse anterior  CV:  Palpation and auscultation of heart done , irregular rhythm, edema (see hpi)  ABDOMEN:  rounded slight distention  SKIN:  frail and fragile  PSYCH:  Calm, making superficial conversation     Recent Labs:     CBC RESULTS:   Recent Labs   Lab Test  03/09/18   0715  02/16/18   0815   WBC  5.5  7.0   RBC  3.89*  4.05*   HGB  11.1*  11.7*   HCT  36.9*  38.7*   MCV  95  96   MCH  28.5  28.9   MCHC  30.1*  30.2*   RDW  14.8  16.7*   PLT  127*  121*       Last Basic Metabolic Panel:  Recent Labs   Lab Test  03/09/18   0715  02/16/18   0815   NA  141  144   POTASSIUM  4.5  4.9   CHLORIDE  107  110*   REGAN  8.4*  8.6   CO2  27  27   BUN  31*  39*   CR  1.21  1.51*   GLC  147*  169*       Liver Function Studies -   Recent Labs   Lab Test  03/09/18   0715  01/28/18   0927   PROTTOTAL  6.1*  6.7*   ALBUMIN  2.6*  3.1*   BILITOTAL  0.6  1.0   ALKPHOS  86  78   AST  15  19   ALT  10  24       TSH   Date Value Ref Range Status   05/06/2015 0.60 0.40 - 4.00 mU/L Final   08/07/2014 0.80 0.40 - 4.00 mU/L Final     Comment:     Effective 7/30/2014, the reference range for this assay has changed to reflect   new instrumentation/methodology.         Lab Results   Component Value Date    A1C 7.4 01/28/2018    A1C 7.8 10/18/2017     Assessment/Plan:  (R06.02) Shortness of breath  (primary encounter diagnosis)  (R60.1) Generalized edema  Comment: Acute  Plan:   -Lasix 20 mg po x 2 days and then lasix 10 mg po qd  -Potassium 20 mEq po daily  -weights and vitals weekly on bath days  -monitor for s/s of respiratory  improvement  -discussed BMP for hospice patient to monitor CKD with lasix use.   -BMP 3/27/18 with INR draw  -monitor for end date or ongoing need of lasix use    Electronically signed by  MARINA Zuniga " CNP                      Sincerely,        Connor Cervantes APRN CNP

## 2018-03-27 NOTE — PROGRESS NOTES
"[place] [facility]    Chief Complaint   Patient presents with     Edema     Pain       HPI:    Suad Sloan is a 87 year old  (12/23/1930), who is being seen today for an episodic care visit at [facility].  HPI information obtained from: facility chart records.Today's concern is:   Chronic systolic CHF  Generalized edema  - weight gain and edema noted by hospice last week and Lasix added. Patient reports mild SOB and is using accessory muscles (some abd) for breathing. O2 sats and VSS    COPD, moderate (H)  - chronic O2    CKD (chronic kidney disease) stage 3, GFR 30-59 ml/min  - noted  Chronic pain syndrome  Neuropathy  - reports \"my feet hurt\" is on giancarlo, prn dilaudid    Altered mental status, unspecified altered mental status type  - continues with intermittent somnolence and confusion, occasional hallucinations/delusions but have lessened with taper up of Seroquel dose    Hospice care patient    ALLERGIES: Ace inhibitors; Cleocin; Dulera; Erythromycin; Hydralazine; Imdur [isosorbide]; Methadone; Penicillins; and Spiriva handihaler  Past Medical, Surgical, Family and Social History reviewed and updated in [facility].    Current Outpatient Prescriptions   Medication Sig Dispense Refill     guaiFENesin (ROBITUSSIN) 100 MG/5ML SYRP Take 20 mLs by mouth every 4 hours as needed for cough       Furosemide (LASIX PO) Take 10 mg by mouth daily Take 20 mg po x 2 days and then 10 mg po qd       Potassium Chloride ER 20 MEQ TBCR Take 20 mEq by mouth daily       QUETIAPINE FUMARATE PO Take 25 mg by mouth every 6 hours as needed (for hallucinations/confusion)       QUETIAPINE FUMARATE PO Take 37.5 mg by mouth At Bedtime       atropine 1 % ophthalmic solution Place 2 drops under the tongue every 2 hours as needed for secretions       acetaminophen (TYLENOL) 650 MG Suppository Place 650 mg rectally every 4 hours as needed for fever or mild pain       bisacodyl (DULCOLAX) 10 MG Suppository Place " 10 mg rectally daily as needed for constipation       omeprazole (PRILOSEC) 20 MG CR capsule Take 1 capsule (20 mg) by mouth daily Take 30-60 minutes before a meal.       QUETIAPINE FUMARATE PO Take 25 mg by mouth every morning        HYDROMORPHONE HCL PO Take 1 mg by mouth every 4 hours as needed for moderate to severe pain        triamcinolone (KENALOG) 0.1 % cream Apply topically 2 times daily ; apply to both lower extremitie for venous stasis       ammonium lactate (LAC-HYDRIN) 12 % lotion Apply topically 2 times daily ; apply to feet/LE       clotrimazole (LOTRIMIN) 1 % cream Apply topically 2 times daily       albuterol (2.5 MG/3ML) 0.083% neb solution Take 1 vial by nebulization every 4 hours as needed for shortness of breath / dyspnea or wheezing       albuterol (2.5 MG/3ML) 0.083% neb solution Take 1 vial by nebulization 2 times daily        senna-docusate (SENOKOT-S;PERICOLACE) 8.6-50 MG per tablet Take 1 tablet by mouth daily       WARFARIN SODIUM PO Take 2.5 mg by mouth At Bedtime ;  Until 04/02; check INR 04/03       acetaminophen (TYLENOL) 325 MG tablet Take 325-650 mg by mouth every 4 hours as needed for mild pain       sennosides (SENOKOT) 8.6 MG tablet Take 2 tablets by mouth 2 times daily as needed        Gabapentin (NEURONTIN PO) Take 300 mg by mouth every 12 hours Reported on 3/27/2017       DULOXETINE HCL PO Take 30 mg by mouth At Bedtime        carvedilol (COREG) 12.5 MG tablet Take 6.25 mg by mouth 2 times daily (with meals)       albuterol (PROAIR HFA, PROVENTIL HFA, VENTOLIN HFA) 108 (90 BASE) MCG/ACT inhaler Inhale 2 puffs into the lungs 2 times daily as needed for shortness of breath / dyspnea or wheezing       tamsulosin (FLOMAX) 0.4 MG 24 hr capsule TAKE ONE CAPSULE BY MOUTH EVERY DAY 90 capsule 3     Medications reviewed:  Medications reconciled to facility chart and changes were made to reflect current medications as identified as above med list. Below are the changes that were made:  "  Medications stopped since last EPIC medication reconciliation:   There are no discontinued medications.    Medications started since last [facility] medication reconciliation:  Orders Placed This Encounter   Medications     guaiFENesin (ROBITUSSIN) 100 MG/5ML SYRP     Sig: Take 20 mLs by mouth every 4 hours as needed for cough       REVIEW OF SYSTEMS:  Limited secondary to cognitive impairment but today pt reports as above    Physical Exam:  /88  Pulse 64  Temp 97.4  F (36.3  C)  Resp 16  Ht 5' 9\" (1.753 m)  Wt 179 lb 1.6 oz (81.2 kg)  SpO2 94%  BMI 26.45 kg/m2    Weights reviewed in [facility]- remain up from [facility]    BP 02/25-03/25: 133-166/63-88 mmHg    Resp: Effort as above, LS decreased throughout  CV: 1+pitting  edema LE, ? abd   Abd- soft, nontender, BS +  Musc- MELENDEZ- generalized weakness  Psych- alert, calm, pleasant      Recent Labs:     CBC RESULTS:   Recent Labs   Lab Test  03/09/18   0715  02/16/18   0815   WBC  5.5  7.0   RBC  3.89*  4.05*   HGB  11.1*  11.7*   HCT  36.9*  38.7*   MCV  95  96   MCH  28.5  28.9   MCHC  30.1*  30.2*   RDW  14.8  16.7*   PLT  127*  121*       Last Basic Metabolic Panel:  Recent Labs   Lab Test  03/09/18   0715  02/16/18   0815   NA  141  144   POTASSIUM  4.5  4.9   CHLORIDE  107  110*   REGAN  8.4*  8.6   CO2  27  27   BUN  31*  39*   CR  1.21  1.51*   GLC  147*  169*       Liver Function Studies -   Recent Labs   Lab Test  03/09/18   0715  01/28/18   0927   PROTTOTAL  6.1*  6.7*   ALBUMIN  2.6*  3.1*   BILITOTAL  0.6  1.0   ALKPHOS  86  78   AST  15  19   ALT  10  24       Lab Results   Component Value Date    A1C 7.4 01/28/2018    A1C 7.8 10/18/2017         Assessment/Plan:  (I50.22) Chronic systolic CHF- EF last Echo 25 - 30% LVH  (R60.1) Generalized edema  (primary encounter diagnosis)  Comment: hypervolemic with edema  Plan: increase daily lasix to 20 mg daily, continue KCL 20 meq daily. Continue on Coreg.Follow weights, VS. clinically    (J44.9) COPD, moderate " (H)  Comment: chronic, stable  Plan: continue current meds/tx    (N18.3) CKD (chronic kidney disease) stage 3, GFR 30-59 ml/min  Comment: noted  Plan: monitor fluid volume status, avoid nephrotoxic agents. 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            Electronically signed by  MARINA Jean-Baptiste CNP

## 2018-04-10 NOTE — PROGRESS NOTES
"[place] [facility]    Chief Complaint   Patient presents with     Agitation       HPI:    Suad Sloan is a 87 year old  (12/23/1930), who is being seen today for an episodic care visit at [facility].  HPI information obtained from: facility chart records.Today's concern is:     Inflammatory arthritis- right wrist  Neuropathy  - Was steroid dependent for some time and has received high dose bursts in past for gouty/inflammatory arthritis. Completed 3 days 10 mg prednisone daily for \"flare\" with good result. Reports scheduled Gabapentin and prn Dilaudid generally effectively controlling discomfort.     Adjustment disorder with mixed anxiety and depressed mood  - wife Marie has noted increased anxiety and today patient reporting fears/anxiety about progressive decline and thought \"of having to stay here forever\"     Hospice care patient  - HERMANN Reeves visited today    ALLERGIES: Ace inhibitors; Cleocin; Dulera; Erythromycin; Hydralazine; Imdur [isosorbide]; Methadone; Penicillins; and Spiriva handihaler  Past Medical, Surgical, Family and Social History reviewed and updated in [facility].    Current Outpatient Prescriptions   Medication Sig Dispense Refill     lidocaine (LMX4) 4 % CREA cream Apply topically 2 times daily ; apply to both lower legs and feet AND topically daily as needed       Warfarin Therapy Reminder 1 each continuous prn       guaiFENesin (ROBITUSSIN) 100 MG/5ML SYRP Take 20 mLs by mouth every 4 hours as needed for cough       Furosemide (LASIX PO) Take 20 mg by mouth daily        Potassium Chloride ER 20 MEQ TBCR Take 20 mEq by mouth daily       QUETIAPINE FUMARATE PO Take 25 mg by mouth every 6 hours as needed (for hallucinations/confusion)       QUETIAPINE FUMARATE PO Take 37.5 mg by mouth At Bedtime       atropine 1 % ophthalmic solution Place 2 drops under the tongue every 2 hours as needed for secretions       acetaminophen (TYLENOL) 650 MG Suppository Place 650 mg " rectally every 4 hours as needed for fever or mild pain       bisacodyl (DULCOLAX) 10 MG Suppository Place 10 mg rectally daily as needed for constipation       omeprazole (PRILOSEC) 20 MG CR capsule Take 1 capsule (20 mg) by mouth daily Take 30-60 minutes before a meal.       QUETIAPINE FUMARATE PO Take 25 mg by mouth every morning        HYDROMORPHONE HCL PO Take 1 mg by mouth every 4 hours as needed for moderate to severe pain        triamcinolone (KENALOG) 0.1 % cream Apply topically 2 times daily ; apply to both lower extremitie for venous stasis       ammonium lactate (LAC-HYDRIN) 12 % lotion Apply topically 2 times daily ; apply to feet/LE       clotrimazole (LOTRIMIN) 1 % cream Apply topically 2 times daily       albuterol (2.5 MG/3ML) 0.083% neb solution Take 1 vial by nebulization every 4 hours as needed for shortness of breath / dyspnea or wheezing       albuterol (2.5 MG/3ML) 0.083% neb solution Take 1 vial by nebulization 2 times daily        senna-docusate (SENOKOT-S;PERICOLACE) 8.6-50 MG per tablet Take 1 tablet by mouth daily       acetaminophen (TYLENOL) 325 MG tablet Take 325-650 mg by mouth every 4 hours as needed for mild pain       sennosides (SENOKOT) 8.6 MG tablet Take 2 tablets by mouth 2 times daily as needed        Gabapentin (NEURONTIN PO) Take 300 mg by mouth every 12 hours Reported on 3/27/2017       DULOXETINE HCL PO Take 30 mg by mouth At Bedtime        carvedilol (COREG) 12.5 MG tablet Take 6.25 mg by mouth 2 times daily (with meals)       albuterol (PROAIR HFA, PROVENTIL HFA, VENTOLIN HFA) 108 (90 BASE) MCG/ACT inhaler Inhale 2 puffs into the lungs 2 times daily as needed for shortness of breath / dyspnea or wheezing       tamsulosin (FLOMAX) 0.4 MG 24 hr capsule TAKE ONE CAPSULE BY MOUTH EVERY DAY 90 capsule 3     Medications reviewed:  Medications reconciled to facility chart and changes were made to reflect current medications as identified as above med list. Below are the changes  "that were made:   Medications stopped since last EPIC medication reconciliation:   Medications Discontinued During This Encounter   Medication Reason     WARFARIN SODIUM PO Medication Reconciliation Clean Up       Medications started since last [facility] medication reconciliation:  Orders Placed This Encounter   Medications     lidocaine (LMX4) 4 % CREA cream     Sig: Apply topically 2 times daily ; apply to both lower legs and feet AND topically daily as needed     Warfarin Therapy Reminder     Si each continuous prn         REVIEW OF SYSTEMS:  4 point ROS including Respiratory, CV, GI and , other than that noted in the HPI,  is negative    Physical Exam:  /88  Pulse 64  Temp 97.4  F (36.3  C)  Resp 16  Ht 5' 9\" (1.753 m)  Wt 179 lb 1.6 oz (81.2 kg)  SpO2 94%  BMI 26.45 kg/m2    Resp: Effort WNL, LS decreased, on O2 NC  CV: VSS  Abd- soft, nontender, BS +  Musc- LLE rotated outward (chronic) and right wrist slight swollen. No erythema or warmth noted. W/c dependent  Psych- alert, calm, pleasant- oriented x 3 today       BP  -: 133-166/63-88 mmHg    Recent Labs:     CBC RESULTS:   Recent Labs   Lab Test  18   0715  18   0815   WBC  5.5  7.0   RBC  3.89*  4.05*   HGB  11.1*  11.7*   HCT  36.9*  38.7*   MCV  95  96   MCH  28.5  28.9   MCHC  30.1*  30.2*   RDW  14.8  16.7*   PLT  127*  121*       Last Basic Metabolic Panel:  Recent Labs   Lab Test  18   0715  18   0815   NA  141  144   POTASSIUM  4.5  4.9   CHLORIDE  107  110*   REGAN  8.4*  8.6   CO2  27  27   BUN  31*  39*   CR  1.21  1.51*   GLC  147*  169*       Liver Function Studies -   Recent Labs   Lab Test  18   0715  18   0927   PROTTOTAL  6.1*  6.7*   ALBUMIN  2.6*  3.1*   BILITOTAL  0.6  1.0   ALKPHOS  86  78   AST  15  19   ALT  10  24       Lab Results   Component Value Date    A1C 7.4 2018    A1C 7.8 10/18/2017         Assessment/Plan:  Inflammatory arthritis  - improved after short low dose " steroid burst    Neuropathy (H), 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 Will increase Duloxetine to 60 mg daily  - continue supportive approach/cares and tx. Including hospice    Hospice care patient  - following closely for comfort care goals/end of life care.         Electronically signed by  MARINA Jean-Baptiste CNP

## 2018-04-17 NOTE — TELEPHONE ENCOUNTER
INR is 1.4 today diagnosis a fib  Last INR 1.5 on 4/10  Coumadin 5 mg on 4/10 and 2.5 mg PO other days     PLAN  Coumadin 5 mg PO M and F and 2.5 mg Other days  INR one week    Electronically signed by MARINA Guadarrama, GNP

## 2018-04-27 NOTE — PROGRESS NOTES
[place] [facility]    Chief Complaint   Patient presents with     group home Regulatory       HPI:    Suad Sloan is a 87 year old  (12/23/1930), who is being seen today for a federally mandated E/M visit at [facility].  HPI information obtained from: facility chart records. Today's concerns are:  Acute gouty arthritis  - recurrent flare to right arm. Recent exac resolved with short prednisone burst.     Alzheimer's dementia with behavioral disturbance, unspecified timing of dementia onset  Major depressive disorder, single episode, moderate (H)   - with recent psychosis- agitation then lethargy, confusion, slurred speech. All improved since addition and titration up of Seroquel. Today patient is alert, calm, approp in conversation. Duloxetine also recently increased with good effect.    Chronic systolic congestive heart failure (H)  Weights had been trending up, maxed at 178 3/15- lasix added back and weights coming down nicely. Cont on Coreg. No edema, denies SOB    COPD, moderate (H)  - chronic, denies SOB currently and on albuterol nebs    Type 2 diabetes mellitus with diabetic neuropathy, without long-term current use of insulin (H)  *  Lab Results   Component Value Date    A1C 8.7 04/18/2018    A1C 7.4 01/28/2018    A1C 7.8 10/18/2017    A1C 7.0 04/19/2017    A1C 7.8 10/12/2016     - on no meds and had not had BGL checks since January     Paroxysmal atrial fibrillation (H)  - rate controlled on Coreg. Remains on warfarin    SCC (squamous cell carcinoma), face  *- f/w derm who prescribed 5- F U - trouble getting it covered. Patient denies pain to area.       ALLERGIES: Ace inhibitors; Cleocin; Dulera; Erythromycin; Hydralazine; Imdur [isosorbide]; Methadone; Penicillins; and Spiriva handihaler  PAST MEDICAL HISTORY:  has a past medical history of AAA (abdominal aortic aneurysm) (H) (9/05); Alcohol dependence (H); Anxiety; Arthritis of hands; Atrial fibrillation (H);  Bladder cancer (H) (5/06); Cardiomyopathy (H); Chronic pain; Chronic systolic congestive heart failure (H); CKD (chronic kidney disease) stage 3, GFR 30-59 ml/min; Closed fracture of neck of left femur (H) (9/26/2016); Closed fracture of neck of left femur (H) (9/26/2016); Colon cancer (H) (9/05); COPD, moderate (H); Coronary atherosclerosis (1995); Depression; Diverticulosis of colon (without mention of hemorrhage); GI bleeding (3/24/2016); Hyperlipidemia; Hypertension; Hypoxia (8/26/2014); Iliac artery aneurysm, left (H); Iron deficiency anemia (7/05); Left leg cellulitis (7/4/2016); Malignant neoplasm of prostate (H) (1994); Peripheral neuropathy; Protein deficiency (H), Albumin2.4  7- (7/25/2016); Pulmonary hypertension; Respiratory failure (H) (12/5/2014); Restless leg syndrome; Sleep apnea (6/10); Sleep apnea; 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  PAST SURGICAL HISTORY:  has a past surgical history that includes Cholecystectomy (1997); Appendectomy open (1970's); Colonoscopy (9/05, 4/07, 2/11); Colon surgery (9/05); AAA repair / Umbilical hernia repair (6/06); cataract iol, rt/lt (Right, 1/09); cataract iol, rt/lt (Left, 2/09); lysis of adhesions, repair of incisional hernias (2/10); Implant pacemaker (09-16-11); FABRIC WRAPPING OF ABDOMINAL ANEURYSM; Endovascular repair aneurysm abdominal aorta (N/A, 12/16/2015); and Esophagoscopy, gastroscopy, duodenoscopy (EGD), combined (N/A, 3/28/2016).  FAMILY HISTORY: family history includes CANCER in his brother and sister; DIABETES in his paternal grandmother; HEART DISEASE (age of onset: 70) in his father; HEART DISEASE (age of onset: 80) in his mother.  SOCIAL HISTORY:  reports that he quit smoking about 8 years ago. His smoking use included Cigarettes. He has a 50.00 pack-year smoking history. He has never used smokeless tobacco. He reports that he does not drink alcohol or use illicit  drugs.    MEDICATIONS:  Current Outpatient Prescriptions   Medication Sig Dispense Refill     acetaminophen (TYLENOL) 325 MG tablet Take 325-650 mg by mouth every 4 hours as needed for mild pain       acetaminophen (TYLENOL) 650 MG Suppository Place 650 mg rectally every 4 hours as needed for fever or mild pain       albuterol (2.5 MG/3ML) 0.083% neb solution Take 1 vial by nebulization 2 times daily        albuterol (2.5 MG/3ML) 0.083% neb solution Take 1 vial by nebulization every 4 hours as needed for shortness of breath / dyspnea or wheezing       albuterol (PROAIR HFA, PROVENTIL HFA, VENTOLIN HFA) 108 (90 BASE) MCG/ACT inhaler Inhale 2 puffs into the lungs 2 times daily as needed for shortness of breath / dyspnea or wheezing       ammonium lactate (LAC-HYDRIN) 12 % lotion Apply topically 2 times daily ; apply to feet/LE       atropine 1 % ophthalmic solution Place 2 drops under the tongue every 2 hours as needed for secretions       bisacodyl (DULCOLAX) 10 MG Suppository Place 10 mg rectally daily as needed for constipation       carvedilol (COREG) 12.5 MG tablet Take 6.25 mg by mouth 2 times daily (with meals)       clotrimazole (LOTRIMIN) 1 % cream Apply topically 2 times daily       DULOXETINE HCL PO Take 30 mg by mouth At Bedtime        Furosemide (LASIX PO) Take 20 mg by mouth daily        Gabapentin (NEURONTIN PO) Take 300 mg by mouth every 12 hours Reported on 3/27/2017       guaiFENesin (ROBITUSSIN) 100 MG/5ML SYRP Take 20 mLs by mouth every 4 hours as needed for cough       HYDROMORPHONE HCL PO Take 1 mg by mouth every 4 hours as needed for moderate to severe pain        lidocaine (LMX4) 4 % CREA cream Apply topically 2 times daily ; apply to both lower legs and feet AND topically daily as needed       omeprazole (PRILOSEC) 20 MG CR capsule Take 1 capsule (20 mg) by mouth daily Take 30-60 minutes before a meal.       Potassium Chloride ER 20 MEQ TBCR Take 20 mEq by mouth daily       QUETIAPINE FUMARATE  "PO Take 25 mg by mouth every morning        QUETIAPINE FUMARATE PO Take 25 mg by mouth every 6 hours as needed (for hallucinations/confusion)       QUETIAPINE FUMARATE PO Take 37.5 mg by mouth At Bedtime       senna-docusate (SENOKOT-S;PERICOLACE) 8.6-50 MG per tablet Take 1 tablet by mouth daily       sennosides (SENOKOT) 8.6 MG tablet Take 2 tablets by mouth 2 times daily as needed        tamsulosin (FLOMAX) 0.4 MG 24 hr capsule TAKE ONE CAPSULE BY MOUTH EVERY DAY 90 capsule 3     triamcinolone (KENALOG) 0.1 % cream Apply topically 2 times daily ; apply to both lower extremitie for venous stasis       Warfarin Therapy Reminder 1 each continuous prn       Medications reviewed:  Medications reconciled to facility chart and changes were made to reflect current medications as identified as above med list. Below are the changes that were made:   Medications stopped since last EPIC medication reconciliation:   There are no discontinued medications.    Medications started since last [facility] medication reconciliation:  No orders of the defined types were placed in this encounter.        Case Management:  I have reviewed the care plan and MDS and do agree with the plan. Patient's desire to return to the community is present, but is not able due to care needs .  Information reviewed:  Medications, vital signs, orders, and nursing notes.    ROS:  As above    Exam:  Vitals: /70  Pulse 89  Temp 97.3  F (36.3  C)  Resp 18  Ht 5' 9\" (1.753 m)  Wt 171 lb 8 oz (77.8 kg)  SpO2 94%  BMI 25.33 kg/m2  BMI= Body mass index is 25.33 kg/(m^2).    Resp: Effort WNL, LSCTA   CV: S1-2, no S3-4, no murmurs noted- - no edema  Abd- soft, nontender, BS +  Musc- MELENDEZ- w/c   Skin- skin tear with SS- d/I left forearm-  Psych- alert, calm, pleasant      BP 03/11-04/22:  133-63-88 mmHg  Weights:   04/15: 171.5lbs  03/25: 179.1lbs  03/15: 178.5lbs  03/13: 177.2lbs      Lab/Diagnostic data:     CBC RESULTS:   Recent Labs   Lab Test  04/18/18   " 0640  03/09/18   0715   WBC  5.1  5.5   RBC  3.64*  3.89*   HGB  10.2*  11.1*   HCT  34.1*  36.9*   MCV  94  95   MCH  28.0  28.5   MCHC  29.9*  30.1*   RDW  14.9  14.8   PLT  157  127*       Last Basic Metabolic Panel:  Recent Labs   Lab Test  04/18/18   0640  03/09/18   0715   NA  136  141   POTASSIUM  4.7  4.5   CHLORIDE  100  107   REGAN  8.5  8.4*   CO2  30  27   BUN  33*  31*   CR  1.41*  1.21   GLC  132*  147*       Liver Function Studies -   Recent Labs   Lab Test  03/09/18   0715  01/28/18   0927   PROTTOTAL  6.1*  6.7*   ALBUMIN  2.6*  3.1*   BILITOTAL  0.6  1.0   ALKPHOS  86  78   AST  15  19   ALT  10  24       Lab Results   Component Value Date    A1C 8.7 04/18/2018    A1C 7.4 01/28/2018       ASSESSMENT/PLAN  Acute gouty arthritis  - resolved    Alzheimer's dementia with behavioral disturbance, unspecified timing of dementia onset  Major depressive disorder, single episode, moderate (H)  - ongoing, stabilized on new med regimen  - continue on Seroquel and Duloxetine  - supportive cares and tx- remains in hospice for now    Chronic systolic congestive heart failure (H)  - weights trending down with addition of lasix  - continue on Coreg  - follow weights, VS    COPD, moderate (H)  - chronic, stable  - continue on sched and prn albuterol    Type 2 diabetes mellitus with diabetic neuropathy, without long-term current use of insulin (H)  - off Metformin-no BGL checks,  A1c climbing  - consider addition back of Metformin, or low dose Lantus    Paroxysmal atrial fibrillation (H)  - rate controlled on Coreg  - tolerating Warfarin - INR goal 2-3    SCC squamous cell carcinoma), face  - f/w Derm  - follow along to attempt to get needed med    Hospice care patient  - follow closely for end of life care in setting of recent dramatic change in mentation and function- now stablizing    Orders:          Electronically signed by:  MARINA Jean-Baptiste CNP

## 2018-04-30 NOTE — PROGRESS NOTES
"[place] [facility]    Chief Complaint   Patient presents with     Wound Check       [place] Medical Record Number:  [id number]    HPI:    Suad Sloan is a 87 year old  (12/23/1930), who is being seen today for an episodic care visit at [facility].  HPI information obtained from: facility chart records.Today's concern is:     Swelling of left thumb  Pain of left upper extremity  Laceration of left upper extremity, subsequent encounter  Cellulitis of left upper extremity  SCC (squamous cell carcinoma), face  Hospice care patient     Writer received phone call from patients wife concerned about \"Gout\" and pain to left arm and thumb. Patient reported noting thumb swelling and pain \"last couple of days\" and worsening pain to left upper extremity laceration. Denies recent trauma to left arm, hand. Denies fever or chills.     ALLERGIES: Ace inhibitors; Cleocin; Dulera; Erythromycin; Hydralazine; Imdur [isosorbide]; Methadone; Penicillins; and Spiriva handihaler  Past Medical, Surgical, Family and Social History reviewed and updated in [employer].    Current Outpatient Prescriptions   Medication Sig Dispense Refill     acetaminophen (TYLENOL) 325 MG tablet Take 325-650 mg by mouth every 4 hours as needed for mild pain       acetaminophen (TYLENOL) 650 MG Suppository Place 650 mg rectally every 4 hours as needed for fever or mild pain       albuterol (2.5 MG/3ML) 0.083% neb solution Take 1 vial by nebulization 2 times daily        albuterol (2.5 MG/3ML) 0.083% neb solution Take 1 vial by nebulization every 4 hours as needed for shortness of breath / dyspnea or wheezing       albuterol (PROAIR HFA, PROVENTIL HFA, VENTOLIN HFA) 108 (90 BASE) MCG/ACT inhaler Inhale 2 puffs into the lungs 2 times daily as needed for shortness of breath / dyspnea or wheezing       ammonium lactate (LAC-HYDRIN) 12 % lotion Apply topically 2 times daily ; apply to feet/LE       atropine 1 % ophthalmic solution " Place 2 drops under the tongue every 2 hours as needed for secretions       bisacodyl (DULCOLAX) 10 MG Suppository Place 10 mg rectally daily as needed for constipation       carvedilol (COREG) 12.5 MG tablet Take 6.25 mg by mouth 2 times daily (with meals)       clotrimazole (LOTRIMIN) 1 % cream Apply topically 2 times daily       DULOXETINE HCL PO Take 60 mg by mouth At Bedtime        Furosemide (LASIX PO) Take 20 mg by mouth daily        Gabapentin (NEURONTIN PO) Take 300 mg by mouth every 12 hours Reported on 3/27/2017       guaiFENesin (ROBITUSSIN) 100 MG/5ML SYRP Take 20 mLs by mouth every 4 hours as needed for cough       HYDROMORPHONE HCL PO Take 1 mg by mouth every 4 hours as needed for moderate to severe pain        lidocaine (LMX4) 4 % CREA cream Apply topically 2 times daily ; apply to both lower legs and feet AND topically daily as needed       omeprazole (PRILOSEC) 20 MG CR capsule Take 1 capsule (20 mg) by mouth daily Take 30-60 minutes before a meal.       Potassium Chloride ER 20 MEQ TBCR Take 20 mEq by mouth daily       QUETIAPINE FUMARATE PO Take 25 mg by mouth every morning        QUETIAPINE FUMARATE PO Take 25 mg by mouth every 6 hours as needed (for hallucinations/confusion)       QUETIAPINE FUMARATE PO Take 37.5 mg by mouth At Bedtime       senna-docusate (SENOKOT-S;PERICOLACE) 8.6-50 MG per tablet Take 1 tablet by mouth daily       sennosides (SENOKOT) 8.6 MG tablet Take 2 tablets by mouth 2 times daily as needed        tamsulosin (FLOMAX) 0.4 MG 24 hr capsule TAKE ONE CAPSULE BY MOUTH EVERY DAY 90 capsule 3     triamcinolone (KENALOG) 0.1 % cream Apply topically 2 times daily ; apply to both lower extremitie for venous stasis       Warfarin Therapy Reminder 1 each continuous prn       Medications reviewed:  Medications reconciled to facility chart and changes were made to reflect current medications as identified as above med list. Below are the changes that were made:   Medications stopped  "since last [facility] medication reconciliation:   There are no discontinued medications.    Medications started since last [facility] medication reconciliation:  No orders of the defined types were placed in this encounter.        REVIEW OF SYSTEMS:  4 point ROS including Respiratory, CV, GI and , other than that noted in the HPI,  is negative    Physical Exam:  /60  Pulse 89  Temp 97.3  F (36.3  C)  Resp 18  Ht 5' 9\" (1.753 m)  Wt 171 lb 8 oz (77.8 kg)  SpO2 99%  BMI 25.33 kg/m2  General- alert, NAD  Resp: Effort WNL, LSCTA   CV: S1-2, no S3-4, no murmurs noted- no edema noted  Abd- soft, nontender, BS +  Musc- MELENDEZ- w/c  Skin- left thumb with new small wound where tubigrip has been rubbing between thumb and index finger- adjacent tissue (on thumb) swollen and erythematic- slightly warm, remainder of hand WNL, laceration to left arm uncovered from under tubigrip that is on arm to protect skin- old Telfa with dried blood removed and found creamy purulent drainage atop jagged laceration with louise crossed SS and surrounding tissue quite erythematic and slight warm to touch (patient reporting associated pain to area worsened with manipulation)- writer cleansed area and removed old SS carefully then replaced and covered area with Tegaderm   Psych- alert, calm, pleasant      03/24-04/22: 133-144/70-88 mmHg    Recent Labs:     CBC RESULTS:   Recent Labs   Lab Test  04/18/18   0640  03/09/18   0715   WBC  5.1  5.5   RBC  3.64*  3.89*   HGB  10.2*  11.1*   HCT  34.1*  36.9*   MCV  94  95   MCH  28.0  28.5   MCHC  29.9*  30.1*   RDW  14.9  14.8   PLT  157  127*       Last Basic Metabolic Panel:  Recent Labs   Lab Test  04/18/18   0640  03/09/18   0715   NA  136  141   POTASSIUM  4.7  4.5   CHLORIDE  100  107   REGAN  8.5  8.4*   CO2  30  27   BUN  33*  31*   CR  1.41*  1.21   GLC  132*  147*       Liver Function Studies -   Recent Labs   Lab Test  03/09/18   0715  01/28/18   0927   PROTTOTAL  6.1*  6.7*   ALBUMIN  2.6*  " 3.1*   BILITOTAL  0.6  1.0   ALKPHOS  86  78   AST  15  19   ALT  10  24       Lab Results   Component Value Date    A1C 8.7 04/18/2018    A1C 7.4 01/28/2018         Assessment/Plan:  Swelling of left thumb  Pain of left upper extremity  Laceration of left upper extremity, subsequent encounter  Cellulitis of left upper extremity  - nsg leave Tubigrips off for remainder of day. When reinitiated should be removed for skin check and to relieve pressure q shift. Ensure tubigrips are clean and dry  - nsg assess left UE laceration q shift, wound needs to be kept clean and dry, if drainage noted under tegaderm it will need to be removed- and provider updated so orders can be changed  - Keflex 500 mg q 12 x 5  - monitor VS per unit protocol  - INRs more frequently while on abx    SCC (squamous cell carcinoma), face  - Fluorouracil ordered per skin speaks has been approved by insurance so is being filled by [facility] pharm -        Hospice care patient  - hospice follows closely      Total time spent with patient visit was 60 minutes including patient visit and discussion with patient and wife. Greater than 50% of total time spent with counseling and coordinating care due to above.     Electronically signed by  MARINA Jean-Baptiste CNP

## 2018-05-01 NOTE — PROGRESS NOTES
"[facility]    Chief Complaint   Patient presents with     Cellulitis     Wound Check       [place] Medical Record Number:  [id number]    HPI:    Suad Sloan is a 87 year old  (12/23/1930), who is being seen today for an episodic care visit at [facility].  HPI information obtained from: facility chart records.Today's concern is:     Cellulitis  Visit for wound check     Less erythema to Left arm and thumb today since addition of Keflex. Left lac wet under tegaderm so this was removed and wound redressed with SS and Telfa. Per patient \"much better\" and less painful since yesterday.    ALLERGIES: Ace inhibitors; Cleocin; Dulera; Erythromycin; Hydralazine; Imdur [isosorbide]; Methadone; Penicillins; and Spiriva handihaler  Past Medical, Surgical, Family and Social History reviewed and updated in [place].    Current Outpatient Prescriptions   Medication Sig Dispense Refill     acetaminophen (TYLENOL) 325 MG tablet Take 325-650 mg by mouth every 4 hours as needed for mild pain       acetaminophen (TYLENOL) 650 MG Suppository Place 650 mg rectally every 4 hours as needed for fever or mild pain       albuterol (2.5 MG/3ML) 0.083% neb solution Take 1 vial by nebulization 2 times daily        albuterol (2.5 MG/3ML) 0.083% neb solution Take 1 vial by nebulization every 4 hours as needed for shortness of breath / dyspnea or wheezing       albuterol (PROAIR HFA, PROVENTIL HFA, VENTOLIN HFA) 108 (90 BASE) MCG/ACT inhaler Inhale 2 puffs into the lungs 2 times daily as needed for shortness of breath / dyspnea or wheezing       ammonium lactate (LAC-HYDRIN) 12 % lotion Apply topically 2 times daily ; apply to feet/LE       atropine 1 % ophthalmic solution Place 2 drops under the tongue every 2 hours as needed for secretions       bisacodyl (DULCOLAX) 10 MG Suppository Place 10 mg rectally daily as needed for constipation       carvedilol (COREG) 12.5 MG tablet Take 6.25 mg by mouth 2 " times daily (with meals)       clotrimazole (LOTRIMIN) 1 % cream Apply topically 2 times daily       DULOXETINE HCL PO Take 60 mg by mouth At Bedtime        Furosemide (LASIX PO) Take 20 mg by mouth daily        Gabapentin (NEURONTIN PO) Take 300 mg by mouth every 12 hours Reported on 3/27/2017       guaiFENesin (ROBITUSSIN) 100 MG/5ML SYRP Take 20 mLs by mouth every 4 hours as needed for cough       HYDROMORPHONE HCL PO Take 1 mg by mouth every 4 hours as needed for moderate to severe pain        lidocaine (LMX4) 4 % CREA cream Apply topically 2 times daily ; apply to both lower legs and feet AND topically daily as needed       omeprazole (PRILOSEC) 20 MG CR capsule Take 1 capsule (20 mg) by mouth daily Take 30-60 minutes before a meal.       Potassium Chloride ER 20 MEQ TBCR Take 20 mEq by mouth daily       QUETIAPINE FUMARATE PO Take 25 mg by mouth every morning        QUETIAPINE FUMARATE PO Take 25 mg by mouth every 6 hours as needed (for hallucinations/confusion)       QUETIAPINE FUMARATE PO Take 37.5 mg by mouth At Bedtime       senna-docusate (SENOKOT-S;PERICOLACE) 8.6-50 MG per tablet Take 1 tablet by mouth daily       sennosides (SENOKOT) 8.6 MG tablet Take 2 tablets by mouth 2 times daily as needed        tamsulosin (FLOMAX) 0.4 MG 24 hr capsule TAKE ONE CAPSULE BY MOUTH EVERY DAY 90 capsule 3     triamcinolone (KENALOG) 0.1 % cream Apply topically 2 times daily ; apply to both lower extremitie for venous stasis       Warfarin Therapy Reminder 1 each continuous prn       Medications reviewed:  Medications reconciled to facility chart and changes were made to reflect current medications as identified as above med list. Below are the changes that were made:   Medications stopped since last EPIC medication reconciliation:   There are no discontinued medications.    Medications started since last [facility] medication reconciliation:  No orders of the defined types were placed in this encounter.        REVIEW OF  "SYSTEMS:  As above    Physical Exam:  /70  Pulse 64  Temp 97.9  F (36.6  C)  Resp 18  Ht 5' 9\" (1.753 m)  Wt 171 lb 8 oz (77.8 kg)  SpO2 97%  BMI 25.33 kg/m2    Resp: Effort WNL  CV: VSS  Abd- soft, nontender, BS +  Musc- MELENDEZ  Skin- as above  Psych- alert, calm, pleasant      03/24-04/22: 133-144/70-88 mmHg    Recent Labs:     CBC RESULTS:   Recent Labs   Lab Test  04/18/18   0640  03/09/18   0715   WBC  5.1  5.5   RBC  3.64*  3.89*   HGB  10.2*  11.1*   HCT  34.1*  36.9*   MCV  94  95   MCH  28.0  28.5   MCHC  29.9*  30.1*   RDW  14.9  14.8   PLT  157  127*       Last Basic Metabolic Panel:  Recent Labs   Lab Test  04/18/18   0640  03/09/18   0715   NA  136  141   POTASSIUM  4.7  4.5   CHLORIDE  100  107   REGAN  8.5  8.4*   CO2  30  27   BUN  33*  31*   CR  1.41*  1.21   GLC  132*  147*       Liver Function Studies -   Recent Labs   Lab Test  03/09/18   0715  01/28/18   0927   PROTTOTAL  6.1*  6.7*   ALBUMIN  2.6*  3.1*   BILITOTAL  0.6  1.0   ALKPHOS  86  78   AST  15  19   ALT  10  24       Lab Results   Component Value Date    A1C 8.7 04/18/2018    A1C 7.4 01/28/2018     Assessment/Plan:  Cellulitis  Visit for wound check  - continue on 7 day Keflex course  - daily assessment skin/wounds - keep clean and dry and nsg to update provider with abnl findings per standard skilled nursing protocol  - VS per unit protocol        Electronically signed by  MARINA Jean-Baptiste CNP                  "

## 2018-05-03 NOTE — TELEPHONE ENCOUNTER
[facility] TELEPHONE ENCOUNTER    Chief Complaint   Patient presents with     INR Followup       Suad Sloan is a 87 year old  (12/23/1930),Nurse called today to report: INR 2.3. Started on keflex 3 days ago     ASSESSMENT/PLAN  Warfarin 5 mg MWF, 2.5 mg T,th,sat, sun.   Recheck INR on monday    Elvira Dsouza NP

## 2018-05-07 NOTE — PROGRESS NOTES
[facility]    Chief Complaint   Patient presents with     Wound Check     Edema       [place] Medical Record Number:  [id number]    HPI:    Suad Sloan is a 87 year old  (12/23/1930), who is being seen today for an episodic care visit at [facility].  HPI information obtained from: facility chart records.Today's concern is:     Visit for wound check  Cellulitis of left upper extremity  Edema     Nursing reported noting weight gain and increased edema. Per nursing lac to LUE is healing- dressing chnaged today.   Patient denies SOB or chest discomfort. Reports wound to LUE and thumb much less painful then last week. Patient is completing Keflex course for cellulitis which is much improved    ALLERGIES: Ace inhibitors; Cleocin; Dulera; Erythromycin; Hydralazine; Imdur [isosorbide]; Methadone; Penicillins; and Spiriva handihaler  Past Medical, Surgical, Family and Social History reviewed and updated in [facility].    Current Outpatient Prescriptions   Medication Sig Dispense Refill     acetaminophen (TYLENOL) 325 MG tablet Take 325-650 mg by mouth every 4 hours as needed for mild pain       acetaminophen (TYLENOL) 650 MG Suppository Place 650 mg rectally every 4 hours as needed for fever or mild pain       albuterol (2.5 MG/3ML) 0.083% neb solution Take 1 vial by nebulization 2 times daily        albuterol (2.5 MG/3ML) 0.083% neb solution Take 1 vial by nebulization every 4 hours as needed for shortness of breath / dyspnea or wheezing       albuterol (PROAIR HFA, PROVENTIL HFA, VENTOLIN HFA) 108 (90 BASE) MCG/ACT inhaler Inhale 2 puffs into the lungs 2 times daily as needed for shortness of breath / dyspnea or wheezing       ammonium lactate (LAC-HYDRIN) 12 % lotion Apply topically 2 times daily ; apply to feet/LE       atropine 1 % ophthalmic solution Place 2 drops under the tongue every 2 hours as needed for secretions       bisacodyl (DULCOLAX) 10 MG Suppository Place 10 mg  rectally daily as needed for constipation       carvedilol (COREG) 12.5 MG tablet Take 6.25 mg by mouth 2 times daily (with meals)       clotrimazole (LOTRIMIN) 1 % cream Apply topically 2 times daily       DULOXETINE HCL PO Take 60 mg by mouth At Bedtime        fluorouracil (EFUDEX) 5 % cream Apply topically 2 times daily       Furosemide (LASIX PO) Take 20 mg by mouth daily        Gabapentin (NEURONTIN PO) Take 300 mg by mouth every 12 hours Reported on 3/27/2017       guaiFENesin (ROBITUSSIN) 100 MG/5ML SYRP Take 20 mLs by mouth every 4 hours as needed for cough       HYDROMORPHONE HCL PO Take 1 mg by mouth every 4 hours as needed for moderate to severe pain        lidocaine (LMX4) 4 % CREA cream Apply topically 2 times daily ; apply to both lower legs and feet AND topically daily as needed       omeprazole (PRILOSEC) 20 MG CR capsule Take 1 capsule (20 mg) by mouth daily Take 30-60 minutes before a meal.       Potassium Chloride ER 20 MEQ TBCR Take 20 mEq by mouth daily       QUETIAPINE FUMARATE PO Take 25 mg by mouth every morning        QUETIAPINE FUMARATE PO Take 25 mg by mouth every 6 hours as needed (for hallucinations/confusion)       QUETIAPINE FUMARATE PO Take 37.5 mg by mouth At Bedtime       senna-docusate (SENOKOT-S;PERICOLACE) 8.6-50 MG per tablet Take 1 tablet by mouth daily       sennosides (SENOKOT) 8.6 MG tablet Take 2 tablets by mouth 2 times daily as needed        tamsulosin (FLOMAX) 0.4 MG 24 hr capsule TAKE ONE CAPSULE BY MOUTH EVERY DAY 90 capsule 3     triamcinolone (KENALOG) 0.1 % cream Apply topically 2 times daily ; apply to both lower extremitie for venous stasis       Warfarin Therapy Reminder 1 each continuous prn       Medications reviewed:  Medications reconciled to facility chart and changes were made to reflect current medications as identified as above med list. Below are the changes that were made:   Medications stopped since last EPIC medication reconciliation:   There are no  "discontinued medications.    Medications started since last [facility] medication reconciliation:  Orders Placed This Encounter   Medications     fluorouracil (EFUDEX) 5 % cream     Sig: Apply topically 2 times daily         REVIEW OF SYSTEMS:  4 point ROS including Respiratory, CV, GI and , other than that noted in the HPI,  is negative    Physical Exam:  /79  Pulse 60  Temp 98  F (36.7  C)  Resp 18  Ht 5' 9\" (1.753 m)  Wt 182 lb 6.4 oz (82.7 kg)  SpO2 96%  BMI 26.94 kg/m2     BP 05/01-05/07:109-158/52-79 mmHg  Weights:   05/07: 182.4lbs  05/06: 180.8lbs  04/15: 171.5lbs  03/25: 179.1lbs  03/15: 178.5lbs    .Resp: Effort WNL, LS diminished in bases  CV: VS as above- trace LE edema  Abd- soft, nontender, BS +  Musc- MELENDEZ- w/c  Skin- foam dressing intact to left UE lac(just changed) and wound between left thumb and index finger healed- no erythema or warmth noted  Psych- alert, calm, pleasant    Recent Labs:     CBC RESULTS:   Recent Labs   Lab Test  04/18/18   0640  03/09/18   0715   WBC  5.1  5.5   RBC  3.64*  3.89*   HGB  10.2*  11.1*   HCT  34.1*  36.9*   MCV  94  95   MCH  28.0  28.5   MCHC  29.9*  30.1*   RDW  14.9  14.8   PLT  157  127*       Last Basic Metabolic Panel:  Recent Labs   Lab Test  04/18/18   0640  03/09/18   0715   NA  136  141   POTASSIUM  4.7  4.5   CHLORIDE  100  107   REGAN  8.5  8.4*   CO2  30  27   BUN  33*  31*   CR  1.41*  1.21   GLC  132*  147*       Liver Function Studies -   Recent Labs   Lab Test  03/09/18   0715  01/28/18   0927   PROTTOTAL  6.1*  6.7*   ALBUMIN  2.6*  3.1*   BILITOTAL  0.6  1.0   ALKPHOS  86  78   AST  15  19   ALT  10  24       Lab Results   Component Value Date    A1C 8.7 04/18/2018    A1C 7.4 01/28/2018         Assessment/Plan:  Visit for wound check  - healing with current wound tx  - monitor daily and keep clean and dry . Nsg update NP with wound disruption/nonhealing    Cellulitis  - 2/2 lac and wound in apex of thumb- both healing and cellulitis " resolved      Edema  - weight up approx 4 lbs from recent baseline  - will add additional lasix course x 3 days and follow weights/clinically            Electronically signed by  MARINA Jean-Baptiste CNP

## 2018-05-23 ENCOUNTER — TELEPHONE (OUTPATIENT)
Dept: FAMILY MEDICINE | Facility: CLINIC | Age: 83
End: 2018-05-23

## 2018-05-23 NOTE — TELEPHONE ENCOUNTER
Wife Marie called to let Dr. Berger know Suad passed away in the care home.  She said they are going to the  home today to make arrangements, but she wanted Dr. Berger aware of his passing.  Sending as JOSSELIN Appiah